# Patient Record
Sex: FEMALE | Race: WHITE | NOT HISPANIC OR LATINO | Employment: FULL TIME | URBAN - METROPOLITAN AREA
[De-identification: names, ages, dates, MRNs, and addresses within clinical notes are randomized per-mention and may not be internally consistent; named-entity substitution may affect disease eponyms.]

---

## 2017-01-01 ENCOUNTER — OUTPATIENT INFUSION SERVICES (OUTPATIENT)
Dept: ONCOLOGY | Facility: MEDICAL CENTER | Age: 22
End: 2017-01-01
Attending: OBSTETRICS & GYNECOLOGY
Payer: COMMERCIAL

## 2017-01-01 VITALS
HEIGHT: 65 IN | WEIGHT: 150.13 LBS | TEMPERATURE: 97 F | SYSTOLIC BLOOD PRESSURE: 121 MMHG | RESPIRATION RATE: 18 BRPM | OXYGEN SATURATION: 96 % | DIASTOLIC BLOOD PRESSURE: 69 MMHG | BODY MASS INDEX: 25.01 KG/M2 | HEART RATE: 92 BPM

## 2017-01-01 DIAGNOSIS — O21.0 HYPEREMESIS GRAVIDARUM: ICD-10-CM

## 2017-01-01 PROCEDURE — 96361 HYDRATE IV INFUSION ADD-ON: CPT

## 2017-01-01 PROCEDURE — 96374 THER/PROPH/DIAG INJ IV PUSH: CPT

## 2017-01-01 PROCEDURE — 700111 HCHG RX REV CODE 636 W/ 250 OVERRIDE (IP): Performed by: OBSTETRICS & GYNECOLOGY

## 2017-01-01 RX ORDER — DEXTROSE, SODIUM CHLORIDE, SODIUM LACTATE, POTASSIUM CHLORIDE, AND CALCIUM CHLORIDE 5; .6; .31; .03; .02 G/100ML; G/100ML; G/100ML; G/100ML; G/100ML
2000 INJECTION, SOLUTION INTRAVENOUS ONCE
Status: COMPLETED | OUTPATIENT
Start: 2017-01-01 | End: 2017-01-01

## 2017-01-01 RX ORDER — ONDANSETRON 2 MG/ML
4 INJECTION INTRAMUSCULAR; INTRAVENOUS ONCE
Status: COMPLETED | OUTPATIENT
Start: 2017-01-01 | End: 2017-01-01

## 2017-01-01 RX ADMIN — ONDANSETRON 4 MG: 2 INJECTION, SOLUTION INTRAMUSCULAR; INTRAVENOUS at 10:49

## 2017-01-01 RX ADMIN — SODIUM CHLORIDE, SODIUM LACTATE, POTASSIUM CHLORIDE, CALCIUM CHLORIDE AND DEXTROSE MONOHYDRATE 2000 ML: 5; 600; 310; 30; 20 INJECTION, SOLUTION INTRAVENOUS at 10:49

## 2017-01-01 NOTE — PROGRESS NOTES
Pt is here for her scheduled hydration. States still having nausea at times - feeling ok this morning. No nausea; nor emesis yet today. She is on her last week of third trimester and hopes her nausea will improve soon. Heat packs used at the IV site for comfort during the hydration. Pt rests comfortably through the treatment. Infusion completed without an incident. Discharged home to self care. Next appointment verified.

## 2017-01-03 ENCOUNTER — APPOINTMENT (OUTPATIENT)
Dept: ONCOLOGY | Facility: MEDICAL CENTER | Age: 22
End: 2017-01-03
Attending: OBSTETRICS & GYNECOLOGY
Payer: COMMERCIAL

## 2017-01-05 ENCOUNTER — APPOINTMENT (OUTPATIENT)
Dept: RADIOLOGY | Facility: MEDICAL CENTER | Age: 22
End: 2017-01-05
Attending: EMERGENCY MEDICINE
Payer: COMMERCIAL

## 2017-01-05 PROCEDURE — 76801 OB US < 14 WKS SINGLE FETUS: CPT

## 2017-01-05 PROCEDURE — 72040 X-RAY EXAM NECK SPINE 2-3 VW: CPT

## 2017-01-10 ENCOUNTER — APPOINTMENT (OUTPATIENT)
Dept: ONCOLOGY | Facility: MEDICAL CENTER | Age: 22
End: 2017-01-10
Attending: OBSTETRICS & GYNECOLOGY
Payer: COMMERCIAL

## 2017-01-15 ENCOUNTER — OUTPATIENT INFUSION SERVICES (OUTPATIENT)
Dept: ONCOLOGY | Facility: MEDICAL CENTER | Age: 22
End: 2017-01-15
Attending: OBSTETRICS & GYNECOLOGY
Payer: COMMERCIAL

## 2017-01-15 ENCOUNTER — HOSPITAL ENCOUNTER (EMERGENCY)
Facility: MEDICAL CENTER | Age: 22
End: 2017-01-15
Attending: EMERGENCY MEDICINE
Payer: COMMERCIAL

## 2017-01-15 VITALS
TEMPERATURE: 98.7 F | SYSTOLIC BLOOD PRESSURE: 114 MMHG | HEART RATE: 79 BPM | BODY MASS INDEX: 24.65 KG/M2 | OXYGEN SATURATION: 97 % | RESPIRATION RATE: 16 BRPM | HEIGHT: 65 IN | DIASTOLIC BLOOD PRESSURE: 80 MMHG | WEIGHT: 147.93 LBS

## 2017-01-15 DIAGNOSIS — O26.891 VAGINAL DISCHARGE DURING PREGNANCY, FIRST TRIMESTER: ICD-10-CM

## 2017-01-15 DIAGNOSIS — N89.8 VAGINAL DISCHARGE DURING PREGNANCY, FIRST TRIMESTER: ICD-10-CM

## 2017-01-15 LAB
BACTERIA GENITAL QL WET PREP: NORMAL
SIGNIFICANT IND 70042: NORMAL
SITE SITE: NORMAL
SOURCE SOURCE: NORMAL

## 2017-01-15 PROCEDURE — 87491 CHLMYD TRACH DNA AMP PROBE: CPT

## 2017-01-15 PROCEDURE — 99284 EMERGENCY DEPT VISIT MOD MDM: CPT

## 2017-01-15 PROCEDURE — 87591 N.GONORRHOEAE DNA AMP PROB: CPT

## 2017-01-15 RX ORDER — METRONIDAZOLE 500 MG/1
500 TABLET ORAL 2 TIMES DAILY
Status: ON HOLD | COMMUNITY
End: 2017-07-21

## 2017-01-15 ASSESSMENT — PAIN SCALES - GENERAL
PAINLEVEL_OUTOF10: 0
PAINLEVEL_OUTOF10: 0

## 2017-01-15 NOTE — ED NOTES
Pelvic exam performed on pt by ERP with myself at the bedside. Privacy maintained. Pt tolerated procedure well. Pt assisted from lithotomy to supine position after procedure. Warm blanket provided. Call light within reach.  Specimens to lab.

## 2017-01-15 NOTE — ED AVS SNAPSHOT
After Visit Summary                                                                                                                Sandy Yang   MRN: [id number]    Department:  [facility], Emergency Dept   Date of Visit:  1/15/2017            [facility], Emergency Dept    [address]    [place] [address]    Phone:  [phone]      You were seen by     Ted Edgar M.D.      Your Diagnosis Was     Vaginal discharge during pregnancy, first trimester     O26.891, N89.8       Follow-up Information     1. Follow up with Olive Valle M.D. In 2 days.    Specialty:  OB/Gyn    Why:  If symptoms worsen, for recheck    Contact information    [address]  [address]  [place] [id number] [phone]        Medication Information     Review all of your home medications and newly ordered medications with your primary doctor and/or pharmacist as soon as possible. Follow medication instructions as directed by your doctor and/or pharmacist.     Please keep your complete medication list with you and share with your physician. Update the information when medications are discontinued, doses are changed, or new medications (including over-the-counter products) are added; and carry medication information at all times in the event of emergency situations.               Medication List      ASK your doctor about these medications        Instructions    metronidazole 500 MG Tabs   Commonly known as:  FLAGYL    Take 500 mg by mouth 2 Times a Day. For 7 days Start date 1/14/2017   Dose:  500 mg       PRENATA PO    Take 1 Tab by mouth every day.   Dose:  1 Tab               Procedures and tests performed during your visit     CHLAMYDIA/GC PCR URINE OR SWAB    PELVIC EXAM    WET PREP        Discharge Instructions       Pregnancy  If you are planning on getting pregnant, it is a good idea to make a preconception appointment with your caregiver to discuss having a  healthy lifestyle before getting pregnant. This includes diet, weight, exercise, taking prenatal vitamins (especially folic acid, which helps prevent brain and spinal cord defects), avoiding alcohol, smoking and illegal drugs, medical problems (diabetes, convulsions), family history of genetic problems, working conditions, and immunizations. It is better to have knowledge of these things and do something about them before getting pregnant.  During your pregnancy, it is important to follow certain guidelines in order to have a healthy baby. It is very important to get good prenatal care and follow your caregiver's instructions. Prenatal care includes all the medical care you receive before your baby's birth. This helps to prevent problems during the pregnancy and childbirth.  HOME CARE INSTRUCTIONS   · Start your prenatal visits by the 12th week of pregnancy or earlier, if possible. At first, appointments are usually scheduled monthly. They become more frequent in the last 2 months before delivery. It is important that you keep your caregiver's appointments and follow your caregiver's instructions regarding medication use, exercise, and diet.  · During pregnancy, you are providing food for you and your baby. Eat a regular, well-balanced diet. Choose foods such as meat, fish, milk and other dairy products, vegetables, fruits, whole-grain breads and cereals. Your caregiver will inform you of the ideal weight gain depending on your current height and weight. Drink lots of liquids. Try to drink 8 glasses of water a day.  · Alcohol is associated with a number of birth defects including fetal alcohol syndrome. It is best to avoid alcohol completely. Smoking will cause low birth rate and prematurity. Use of alcohol and nicotine during your pregnancy also increases the chances that your child will be chemically dependent later in their life and may contribute to SIDS (Sudden Infant Death Syndrome).  · Do not use illegal  drugs.  · Only take prescription or over-the-counter medications that are recommended by your caregiver. Other medications can cause genetic and physical problems in the baby.  · Morning sickness can often be helped by keeping soda crackers at the bedside. Eat a few before getting up in the morning.  · A sexual relationship may be continued until near the end of pregnancy if there are no other problems such as early (premature) leaking of amniotic fluid from the membranes, vaginal bleeding, painful intercourse or belly (abdominal) pain.  · Exercise regularly. Check with your caregiver if you are unsure of the safety of some of your exercises.  · Do not use hot tubs, steam rooms or saunas. These increase the risk of fainting and hurting yourself and the baby. Swimming is OK for exercise. Get plenty of rest, including afternoon naps when possible, especially in the third trimester.  · Avoid toxic odors and chemicals.  · Do not wear high heels. They may cause you to lose your balance and fall.  · Do not lift over 5 pounds. If you do lift anything, lift with your legs and thighs, not your back.  · Avoid long trips, especially in the third trimester.  · If you have to travel out of the city or state, take a copy of your medical records with you.  SEEK IMMEDIATE MEDICAL CARE IF:   · You develop an unexplained oral temperature above 102° F (38.9° C), or as your caregiver suggests.  · You have leaking of fluid from the vagina. If leaking membranes are suspected, take your temperature and inform your caregiver of this when you call.  · There is vaginal spotting or bleeding. Notify your caregiver of the amount and how many pads are used.  · You continue to feel sick to your stomach (nauseous) and have no relief from remedies suggested, or you throw up (vomit) blood or coffee ground like materials.  · You develop upper abdominal pain.  · You have round ligament discomfort in the lower abdominal area. This still must be  evaluated by your caregiver.  · You feel contractions of the uterus.  · You do not feel the baby move, or there is less movement than before.  · You have painful urination.  · You have abnormal vaginal discharge.  · You have persistent diarrhea.  · You get a severe headache.  · You have problems with your vision.  · You develop muscle weakness.  · You feel dizzy and faint.  · You develop shortness of breath.  · You develop chest pain.  · You have back pain that travels down to your leg and feet.  · You feel irregular or a very fast heartbeat.  · You develop excessive weight gain in a short period of time (5 pounds in 3 to 5 days).  · You are involved in a domestic violence situation.  Document Released: 2006 Document Revised: 2013 Document Reviewed: 2010  ExitCare® Patient Information © [employer].  Pelvic Rest  Pelvic rest is sometimes recommended for women when:   · The placenta is partially or completely covering the opening of the cervix (placenta previa).  · There is bleeding between the uterine wall and the amniotic sac in the first trimester (subchorionic hemorrhage).  · The cervix begins to open without labor starting (incompetent cervix, cervical insufficiency).  · The labor is too early ( labor).  HOME CARE INSTRUCTIONS  · Do not have sexual intercourse, stimulation, or an orgasm.  · Do not use tampons, douche, or put anything in the vagina.  · Do not lift anything over 10 pounds (4.5 kg).  · Avoid strenuous activity or straining your pelvic muscles.  SEEK MEDICAL CARE IF:   · You have any vaginal bleeding during pregnancy. Treat this as a potential emergency.  · You have cramping pain felt low in the stomach (stronger than menstrual cramps).  · You notice vaginal discharge (watery, mucus, or bloody).  · You have a low, dull backache.  · There are regular contractions or uterine tightening.  SEEK IMMEDIATE MEDICAL CARE IF:  You have vaginal bleeding and have placenta previa.       This information is not intended to replace advice given to you by your health care provider. Make sure you discuss any questions you have with your health care provider.     Document Released: 04/13/2012 Document Revised: 03/11/2013 Document Reviewed: 04/13/2012  Elsevier Interactive Patient Education ©2016 [employer]            Patient Information     Patient Information    Following emergency treatment: all patient requiring follow-up care must return either to a private physician or a clinic if your condition worsens before you are able to obtain further medical attention, please return to the emergency room.     Billing Information    At [facility], we work to make the billing process streamlined for our patients.  Our Representatives are here to answer any questions you may have regarding your hospital bill.  If you have insurance coverage and have supplied your insurance information to us, we will submit a claim to your insurer on your behalf.  Should you have any questions regarding your bill, we can be reached online or by phone as follows:  Online: You are able pay your bills online or live chat with our representatives about any billing questions you may have. We are here to help Monday - Friday from 8:00am to 7:30pm and 9:00am - 12:00pm on Saturdays.  Please visit [email or web address]  for more information.   Phone:  [phone] or [phone]    Please note that your emergency physician, surgeon, pathologist, radiologist, anesthesiologist, and other specialists are not employed by [facility] and will therefore bill separately for their services.  Please contact them directly for any questions concerning their bills at the numbers below:     Emergency Physician Services:  [phone]  [facility]:  [phone]  [facility]:  [phone]  [facility]:  [phone]    1. Your final bill may vary from the amount  quoted upon discharge if all procedures are not complete at that time, or if your doctor has additional procedures of which we are not aware. You will receive an additional bill if you return to the Emergency Department at [facility] for suture removal regardless of the facility of which the sutures were placed.     2. Please arrange for settlement of this account at the emergency registration.    3. All self-pay accounts are due in full at the time of treatment.  If you are unable to meet this obligation then payment is expected within 4-5 days.     4. If you have had radiology studies (CT, X-ray, Ultrasound, MRI), you have received a preliminary result during your emergency department visit. Please contact the radiology department [phone] to receive a copy of your final result. Please discuss the Final result with your primary physician or with the follow up physician provided.     Crisis Hotline:  [place] Crisis Hotline:  9-251-SWXXBYT or 1-680.743.2511  [place] Crisis Hotline:    1-585.208.4976 or 959-970-0957         ED Discharge Follow Up Questions    1. In order to provide you with very good care, we would like to follow up with a phone call in the next few days.  May we have your permission to contact you?     YES /  NO    2. What is the best phone number to call you? (       )_____-__________    3. What is the best time to call you?      Morning  /  Afternoon  /  Evening                   Patient Signature:  ____________________________________________________________    Date:  ____________________________________________________________      Your appointments     Jan 22, 2017 11:00 AM   Est Hydration with RN 1   In[facility] ([facility])    [address]  [place] [address]   [phone]            Jan 29, 2017 11:00 AM   Est Hydration with RN 2   In[facility] ([facility])    [address]  [place] [address]   [phone]            Feb 05, 2017 11:00 AM   Est  Hydration with RN 2   Infusion [facility] ([facility])    [address]  [place] [address]   [phone]            Feb 12, 2017 11:00 AM   Est Hydration with RN 2   In[facility] ([facility])    [address]  [place] [address]   [phone]            Feb 19, 2017 11:00 AM   Est Hydration with RN 2   Infusion [facility] ([facility])    [address]  [place] [address]   [phone]            Feb 26, 2017 11:00 AM   Est Hydration with RN 2   In[facility] ([facility])    [address]  [place] [address]   [phone]            Mar 05, 2017 11:00 AM   Est Hydration with RN 2   In[facility] ([facility])    [address]  [place] [address]   [phone]            Mar 12, 2017 11:00 AM   Est Hydration with RN 2   Infusion [facility] ([facility])    [address]  [place] [address]   [phone]            Mar 19, 2017 11:00 AM   Est Hydration with RN 2   Infusion [facility] ([facility])    [address]  [place] [address]   [phone]            Mar 26, 2017 11:00 AM   Est Hydration with RN 2   Infusion [facility] ([facility])    [address]  [place] [address]   [phone]            Apr 02, 2017 11:00 AM   Est Hydration with RN 2   Infusion [facility] ([facility])    [address]  [place] [address]   [phone]

## 2017-01-15 NOTE — ED AVS SNAPSHOT
InTouch Technology Access Code: Activation code not generated  Current InTouch Technology Status: Active    [employer]  A secure, online tool to manage your health information     [employer]’s InTouch Technology® is a secure, online tool that connects you to your personalized health information from the privacy of your home -- day or night - making it very easy for you to manage your healthcare. Once the activation process is completed, you can even access your medical information using the InTouch Technology ivory, which is available for free in the Apple Ivory store or Google Play store.     InTouch Technology provides the following levels of access (as shown below):   My Chart Features   [facility] Primary Care Doctor [facility]  Specialists [facility]  Urgent  Care Non-[facility]  Primary Care  Doctor   Email your healthcare team securely and privately 24/7 X X X X   Manage appointments: schedule your next appointment; view details of past/upcoming appointments X      Request prescription refills. X      View recent personal medical records, including lab and immunizations X X X X   View health record, including health history, allergies, medications X X X X   Read reports about your outpatient visits, procedures, consult and ER notes X X X X   See your discharge summary, which is a recap of your hospital and/or ER visit that includes your diagnosis, lab results, and care plan. X X       How to register for InTouch Technology:  1. Go to  [email or web address].  2. Click on the Sign Up Now box, which takes you to the New Member Sign Up page. You will need to provide the following information:  a. Enter your InTouch Technology Access Code exactly as it appears at the top of this page. (You will not need to use this code after you’ve completed the sign-up process. If you do not sign up before the expiration date, you must request a new code.)   b. Enter your date of birth.   c. Enter your home email address.   d. Click Submit, and follow the next screen’s instructions.  3. Create a InTouch Technology ID. This will  be your [employer] login ID and cannot be changed, so think of one that is secure and easy to remember.  4. Create a [employer] password. You can change your password at any time.  5. Enter your Password Reset Question and Answer. This can be used at a later time if you forget your password.   6. Enter your e-mail address. This allows you to receive e-mail notifications when new information is available in [employer].  7. Click Sign Up. You can now view your health information.    For assistance activating your [employer] account, call [phone]

## 2017-01-15 NOTE — DISCHARGE INSTRUCTIONS
Pregnancy  If you are planning on getting pregnant, it is a good idea to make a preconception appointment with your caregiver to discuss having a healthy lifestyle before getting pregnant. This includes diet, weight, exercise, taking prenatal vitamins (especially folic acid, which helps prevent brain and spinal cord defects), avoiding alcohol, smoking and illegal drugs, medical problems (diabetes, convulsions), family history of genetic problems, working conditions, and immunizations. It is better to have knowledge of these things and do something about them before getting pregnant.  During your pregnancy, it is important to follow certain guidelines in order to have a healthy baby. It is very important to get good prenatal care and follow your caregiver's instructions. Prenatal care includes all the medical care you receive before your baby's birth. This helps to prevent problems during the pregnancy and childbirth.  HOME CARE INSTRUCTIONS   · Start your prenatal visits by the 12th week of pregnancy or earlier, if possible. At first, appointments are usually scheduled monthly. They become more frequent in the last 2 months before delivery. It is important that you keep your caregiver's appointments and follow your caregiver's instructions regarding medication use, exercise, and diet.  · During pregnancy, you are providing food for you and your baby. Eat a regular, well-balanced diet. Choose foods such as meat, fish, milk and other dairy products, vegetables, fruits, whole-grain breads and cereals. Your caregiver will inform you of the ideal weight gain depending on your current height and weight. Drink lots of liquids. Try to drink 8 glasses of water a day.  · Alcohol is associated with a number of birth defects including fetal alcohol syndrome. It is best to avoid alcohol completely. Smoking will cause low birth rate and prematurity. Use of alcohol and nicotine during your pregnancy also increases the chances that  your child will be chemically dependent later in their life and may contribute to SIDS (Sudden Infant Death Syndrome).  · Do not use illegal drugs.  · Only take prescription or over-the-counter medications that are recommended by your caregiver. Other medications can cause genetic and physical problems in the baby.  · Morning sickness can often be helped by keeping soda crackers at the bedside. Eat a few before getting up in the morning.  · A sexual relationship may be continued until near the end of pregnancy if there are no other problems such as early (premature) leaking of amniotic fluid from the membranes, vaginal bleeding, painful intercourse or belly (abdominal) pain.  · Exercise regularly. Check with your caregiver if you are unsure of the safety of some of your exercises.  · Do not use hot tubs, steam rooms or saunas. These increase the risk of fainting and hurting yourself and the baby. Swimming is OK for exercise. Get plenty of rest, including afternoon naps when possible, especially in the third trimester.  · Avoid toxic odors and chemicals.  · Do not wear high heels. They may cause you to lose your balance and fall.  · Do not lift over 5 pounds. If you do lift anything, lift with your legs and thighs, not your back.  · Avoid long trips, especially in the third trimester.  · If you have to travel out of the city or state, take a copy of your medical records with you.  SEEK IMMEDIATE MEDICAL CARE IF:   · You develop an unexplained oral temperature above 102° F (38.9° C), or as your caregiver suggests.  · You have leaking of fluid from the vagina. If leaking membranes are suspected, take your temperature and inform your caregiver of this when you call.  · There is vaginal spotting or bleeding. Notify your caregiver of the amount and how many pads are used.  · You continue to feel sick to your stomach (nauseous) and have no relief from remedies suggested, or you throw up (vomit) blood or coffee ground like  materials.  · You develop upper abdominal pain.  · You have round ligament discomfort in the lower abdominal area. This still must be evaluated by your caregiver.  · You feel contractions of the uterus.  · You do not feel the baby move, or there is less movement than before.  · You have painful urination.  · You have abnormal vaginal discharge.  · You have persistent diarrhea.  · You get a severe headache.  · You have problems with your vision.  · You develop muscle weakness.  · You feel dizzy and faint.  · You develop shortness of breath.  · You develop chest pain.  · You have back pain that travels down to your leg and feet.  · You feel irregular or a very fast heartbeat.  · You develop excessive weight gain in a short period of time (5 pounds in 3 to 5 days).  · You are involved in a domestic violence situation.  Document Released: 2006 Document Revised: 2013 Document Reviewed: 2010  ExitCare® Patient Information © [employer].  Pelvic Rest  Pelvic rest is sometimes recommended for women when:   · The placenta is partially or completely covering the opening of the cervix (placenta previa).  · There is bleeding between the uterine wall and the amniotic sac in the first trimester (subchorionic hemorrhage).  · The cervix begins to open without labor starting (incompetent cervix, cervical insufficiency).  · The labor is too early ( labor).  HOME CARE INSTRUCTIONS  · Do not have sexual intercourse, stimulation, or an orgasm.  · Do not use tampons, douche, or put anything in the vagina.  · Do not lift anything over 10 pounds (4.5 kg).  · Avoid strenuous activity or straining your pelvic muscles.  SEEK MEDICAL CARE IF:   · You have any vaginal bleeding during pregnancy. Treat this as a potential emergency.  · You have cramping pain felt low in the stomach (stronger than menstrual cramps).  · You notice vaginal discharge (watery, mucus, or bloody).  · You have a low, dull backache.  · There  are regular contractions or uterine tightening.  SEEK IMMEDIATE MEDICAL CARE IF:  You have vaginal bleeding and have placenta previa.      This information is not intended to replace advice given to you by your health care provider. Make sure you discuss any questions you have with your health care provider.     Document Released: 04/13/2012 Document Revised: 03/11/2013 Document Reviewed: 04/13/2012  [employer] Interactive Patient Education ©2016 [employer]
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
## 2017-01-15 NOTE — ED AVS SNAPSHOT
1/15/2017          Sandy Yang  [address] [address] [facility] [place] [address]    Dear Sandy:    [facility] wants to ensure your discharge home is safe and you or your loved ones have had all your questions answered regarding your care after you leave the hospital.    You may receive a telephone call within two days of your discharge.  This call is to make certain you understand your discharge instructions as well as ensure we provided you with the best care possible during your stay with us.     The call will only last approximately 3-5 minutes and will be done by a nurse.    Once again, we want to ensure your discharge home is safe and that you have a clear understanding of any next steps in your care.  If you have any questions or concerns, please do not hesitate to contact us, we are here for you.  Thank you for choosing [facility] for your healthcare needs.    Sincerely,    Jass Reaves    [facility]

## 2017-01-16 LAB
C TRACH DNA SPEC QL NAA+PROBE: NEGATIVE
N GONORRHOEA DNA SPEC QL NAA+PROBE: NEGATIVE
SPECIMEN SOURCE: NORMAL

## 2017-02-05 ENCOUNTER — APPOINTMENT (OUTPATIENT)
Dept: ONCOLOGY | Facility: MEDICAL CENTER | Age: 22
End: 2017-02-05
Attending: OBSTETRICS & GYNECOLOGY
Payer: COMMERCIAL

## 2017-02-12 ENCOUNTER — APPOINTMENT (OUTPATIENT)
Dept: ONCOLOGY | Facility: MEDICAL CENTER | Age: 22
End: 2017-02-12
Attending: OBSTETRICS & GYNECOLOGY
Payer: COMMERCIAL

## 2017-02-19 ENCOUNTER — APPOINTMENT (OUTPATIENT)
Dept: ONCOLOGY | Facility: MEDICAL CENTER | Age: 22
End: 2017-02-19
Attending: OBSTETRICS & GYNECOLOGY
Payer: COMMERCIAL

## 2017-02-26 ENCOUNTER — APPOINTMENT (OUTPATIENT)
Dept: ONCOLOGY | Facility: MEDICAL CENTER | Age: 22
End: 2017-02-26
Attending: OBSTETRICS & GYNECOLOGY
Payer: COMMERCIAL

## 2017-03-05 ENCOUNTER — APPOINTMENT (OUTPATIENT)
Dept: ONCOLOGY | Facility: MEDICAL CENTER | Age: 22
End: 2017-03-05
Attending: OBSTETRICS & GYNECOLOGY
Payer: COMMERCIAL

## 2017-03-12 ENCOUNTER — APPOINTMENT (OUTPATIENT)
Dept: ONCOLOGY | Facility: MEDICAL CENTER | Age: 22
End: 2017-03-12
Attending: OBSTETRICS & GYNECOLOGY
Payer: COMMERCIAL

## 2017-03-19 ENCOUNTER — APPOINTMENT (OUTPATIENT)
Dept: ONCOLOGY | Facility: MEDICAL CENTER | Age: 22
End: 2017-03-19
Attending: OBSTETRICS & GYNECOLOGY
Payer: COMMERCIAL

## 2017-03-26 ENCOUNTER — APPOINTMENT (OUTPATIENT)
Dept: ONCOLOGY | Facility: MEDICAL CENTER | Age: 22
End: 2017-03-26
Attending: OBSTETRICS & GYNECOLOGY
Payer: COMMERCIAL

## 2017-03-31 ENCOUNTER — PATIENT OUTREACH (OUTPATIENT)
Dept: HEALTH INFORMATION MANAGEMENT | Facility: OTHER | Age: 22
End: 2017-03-31

## 2017-03-31 NOTE — Clinical Note
3/31/2017  Sandy Yang  [address] [address] [facility] [place] [address]     Dear Sandy,    Based on your medical history, our records indicate that you are eligible for [facility] Care Management, a free program that focuses on coordinating the care of individuals with ongoing or complex medical needs. Coordinated care can decrease the total cost of care and, most importantly, improve your overall health.    As a Care Management participant, you’ll be assigned a personal care manager - a medical provider that specializes in carefully monitoring your health and providing care in between visits with your primary care provider and specialists. This program is of no cost to you as it’s a part of [facility]’s ongoing commitment to serving the people of our community.    Benefits of our free Care Management program include:  • Priority appointment scheduling with your [facility] healthcare team  • A personal care manager to coordinate your healthcare   • A comprehensive needs assessment  • Development of an individualized care plan   • Chronic disease education  • A free in-home monitoring device (for eligible patients)    Please contact us at [phone] as soon as possible to confirm your enrollment. Once you have confirmed your participation in this program, we will schedule an introduction with your personal care manager.     For your convenience, we are available 7 days a week from 8:30 a.m. - 7 p.m.    We look forward to speaking with you soon.    Sincerely,   Miguelina ABDULLAHI

## 2017-04-02 ENCOUNTER — APPOINTMENT (OUTPATIENT)
Dept: ONCOLOGY | Facility: MEDICAL CENTER | Age: 22
End: 2017-04-02
Attending: OBSTETRICS & GYNECOLOGY
Payer: COMMERCIAL

## 2017-04-04 ENCOUNTER — OFFICE VISIT (OUTPATIENT)
Dept: MEDICAL GROUP | Age: 22
End: 2017-04-04
Payer: COMMERCIAL

## 2017-04-04 ENCOUNTER — HOSPITAL ENCOUNTER (OUTPATIENT)
Dept: LAB | Facility: MEDICAL CENTER | Age: 22
End: 2017-04-04
Attending: OBSTETRICS & GYNECOLOGY
Payer: COMMERCIAL

## 2017-04-04 VITALS
TEMPERATURE: 98.4 F | BODY MASS INDEX: 27.89 KG/M2 | DIASTOLIC BLOOD PRESSURE: 64 MMHG | SYSTOLIC BLOOD PRESSURE: 120 MMHG | HEART RATE: 88 BPM | WEIGHT: 167.4 LBS | OXYGEN SATURATION: 97 % | HEIGHT: 65 IN

## 2017-04-04 DIAGNOSIS — D48.9 NEOPLASM OF UNCERTAIN BEHAVIOR: ICD-10-CM

## 2017-04-04 LAB
ABO GROUP BLD: NORMAL
AMORPH CRY #/AREA URNS HPF: PRESENT /HPF
APPEARANCE UR: ABNORMAL
BACTERIA #/AREA URNS HPF: ABNORMAL /HPF
BASOPHILS # BLD AUTO: 0.1 % (ref 0–1.8)
BASOPHILS # BLD: 0.01 K/UL (ref 0–0.12)
BILIRUB UR QL STRIP.AUTO: NEGATIVE
BLD GP AB SCN SERPL QL: NORMAL
CAOX CRY #/AREA URNS HPF: ABNORMAL /HPF
COLOR UR: YELLOW
CULTURE IF INDICATED INDCX: YES UA CULTURE
EOSINOPHIL # BLD AUTO: 0.04 K/UL (ref 0–0.51)
EOSINOPHIL NFR BLD: 0.4 % (ref 0–6.9)
EPI CELLS #/AREA URNS HPF: ABNORMAL /HPF
ERYTHROCYTE [DISTWIDTH] IN BLOOD BY AUTOMATED COUNT: 43.7 FL (ref 35.9–50)
GLUCOSE UR STRIP.AUTO-MCNC: NEGATIVE MG/DL
HBV SURFACE AG SER QL: NEGATIVE
HCT VFR BLD AUTO: 35.3 % (ref 37–47)
HGB BLD-MCNC: 11.5 G/DL (ref 12–16)
HIV 1+2 AB+HIV1 P24 AG SERPL QL IA: NON REACTIVE
IMM GRANULOCYTES # BLD AUTO: 0.06 K/UL (ref 0–0.11)
IMM GRANULOCYTES NFR BLD AUTO: 0.6 % (ref 0–0.9)
KETONES UR STRIP.AUTO-MCNC: NEGATIVE MG/DL
LEUKOCYTE ESTERASE UR QL STRIP.AUTO: ABNORMAL
LYMPHOCYTES # BLD AUTO: 1.99 K/UL (ref 1–4.8)
LYMPHOCYTES NFR BLD: 21.1 % (ref 22–41)
MCH RBC QN AUTO: 32.6 PG (ref 27–33)
MCHC RBC AUTO-ENTMCNC: 32.6 G/DL (ref 33.6–35)
MCV RBC AUTO: 100 FL (ref 81.4–97.8)
MICRO URNS: ABNORMAL
MONOCYTES # BLD AUTO: 0.55 K/UL (ref 0–0.85)
MONOCYTES NFR BLD AUTO: 5.8 % (ref 0–13.4)
NEUTROPHILS # BLD AUTO: 6.79 K/UL (ref 2–7.15)
NEUTROPHILS NFR BLD: 72 % (ref 44–72)
NITRITE UR QL STRIP.AUTO: NEGATIVE
NRBC # BLD AUTO: 0 K/UL
NRBC BLD AUTO-RTO: 0 /100 WBC
PH UR STRIP.AUTO: 7 [PH]
PLATELET # BLD AUTO: 222 K/UL (ref 164–446)
PMV BLD AUTO: 11.3 FL (ref 9–12.9)
PROT UR QL STRIP: NEGATIVE MG/DL
RBC # BLD AUTO: 3.53 M/UL (ref 4.2–5.4)
RBC # URNS HPF: ABNORMAL /HPF
RBC UR QL AUTO: NEGATIVE
RH BLD: NORMAL
RUBV AB SER QL: 67.1 IU/ML
SP GR UR STRIP.AUTO: 1.02
TREPONEMA PALLIDUM IGG+IGM AB [PRESENCE] IN SERUM OR PLASMA BY IMMUNOASSAY: NON REACTIVE
WBC # BLD AUTO: 9.4 K/UL (ref 4.8–10.8)
WBC #/AREA URNS HPF: ABNORMAL /HPF

## 2017-04-04 PROCEDURE — 87340 HEPATITIS B SURFACE AG IA: CPT

## 2017-04-04 PROCEDURE — 86850 RBC ANTIBODY SCREEN: CPT

## 2017-04-04 PROCEDURE — 87086 URINE CULTURE/COLONY COUNT: CPT

## 2017-04-04 PROCEDURE — 86901 BLOOD TYPING SEROLOGIC RH(D): CPT

## 2017-04-04 PROCEDURE — 81001 URINALYSIS AUTO W/SCOPE: CPT

## 2017-04-04 PROCEDURE — 86900 BLOOD TYPING SEROLOGIC ABO: CPT

## 2017-04-04 PROCEDURE — 99214 OFFICE O/P EST MOD 30 MIN: CPT | Performed by: FAMILY MEDICINE

## 2017-04-04 PROCEDURE — 86780 TREPONEMA PALLIDUM: CPT

## 2017-04-04 PROCEDURE — 87389 HIV-1 AG W/HIV-1&-2 AB AG IA: CPT

## 2017-04-04 PROCEDURE — 86762 RUBELLA ANTIBODY: CPT

## 2017-04-04 PROCEDURE — 85025 COMPLETE CBC W/AUTO DIFF WBC: CPT

## 2017-04-04 ASSESSMENT — PATIENT HEALTH QUESTIONNAIRE - PHQ9: CLINICAL INTERPRETATION OF PHQ2 SCORE: 2

## 2017-04-04 NOTE — ASSESSMENT & PLAN NOTE
New issue    The patient is a 21-year-old female who presents to clinic with chief complaint of growing red area on right upper chest. She states been there for about 2 years however recently has grown, she scratches it often and it becomes inflamed and irritated. Patient does not use sunscreen consistently, nor initiate frequent sunbather. There is no family history of skin cancer.

## 2017-04-04 NOTE — MR AVS SNAPSHOT
"        Sandy Yang   2017 3:20 PM   Office Visit   MRN: [id number]    Department:  [address] [place] [address]   Dept Phone:  [phone]    Description:  Female : 1995   Provider:  Juan Candelario M.D.           Allergies as of 2017     Allergen Noted Reactions    Lidocaine 2017       Morphine 2016   Rash    Generalized itching and redness.     Morphine 2017       Novocain 2014       Used during dental surgery; had no affect toward deadening her pain    Rocephin [Ceftriaxone] 2015   Rash    Itching      Zoloft 2011   Shortness of Breath    Panic     Zoloft 2017         You were diagnosed with     Neoplasm of uncertain behavior   [[id number]]         Vital Signs     Blood Pressure Pulse Temperature Height Weight Body Mass Index    120/64 mmHg 88 36.9 °C (98.4 °F) 1.651 m (5' 5\") 75.932 kg (167 lb 6.4 oz) 27.86 kg/m2    Oxygen Saturation Last Menstrual Period Breastfeeding? Smoking Status          97% 10/17/2016 No Never Smoker         Basic Information     Date Of Birth Sex Race Ethnicity Preferred Language    1995 Female White Non- English      Problem List              ICD-10-CM Priority Class Noted - Resolved    Seasonal allergies J30.2   3/22/2011 - Present    Gastritis K29.70   11/15/2013 - Present    H/O gastric ulcer Z87.19   11/15/2013 - Present    Family planning Z30.09   2013 - Present    Menstrual irregularity N92.6   2013 - Present    S/P excision of fibroadenoma of breast Z98.890, Z86.018   2013 - Present    Atypical chest pain R07.89   2014 - Present    Major depressive disorder, recurrent, mild (CMS-HCC) F33.0   6/15/2015 - Present    Chronic tonsillitis J35.01   2015 - Present    Myalgia M79.1   2015 - Present    Viral syndrome B34.9   2015 - Present    Drug rash L27.0   2015 - Present    Hypesthesia R20.1   2015 - Present    Abnormal urinalysis R82.90   2015 - Present "    Neck pain M54.2   12/2/2015 - Present    Fever R50.9   12/2/2015 - Present    Postcoital bleeding N93.0   4/6/2016 - Present    Screening for STD (sexually transmitted disease) Z11.3   4/6/2016 - Present    Pregnancy Z33.1   5/13/2016 - Present    Fetal demise before 22 weeks with retention of dead fetus O02.1   8/5/2016 - Present    Encounter for initial prescription of contraceptive pills Z30.011   9/2/2016 - Present    Acute vaginitis N76.0   9/2/2016 - Present    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   4/4/2017 - Present      Health Maintenance        Date Due Completion Dates    IMM HEP A VACCINE (1 of 2 - Standard Series) 11/7/1994 ---    IMM HEP B VACCINE (3 of 3 - Primary Series) 8/4/1997 5/14/1997, 4/14/1997    IMM VARICELLA (CHICKENPOX) VACCINE (1 of 2 - 2 Dose Adolescent Series) 11/7/2006 ---    PAP SMEAR 7/6/2019 7/6/2016, 2/17/2015    IMM DTaP/Tdap/Td Vaccine (6 - Td) 9/22/2025 9/22/2015, 5/3/1995, 9/8/1994, 7/1/1994, 3/15/1994            Current Immunizations     Dtap Vaccine 5/3/1995, 9/8/1994, 7/1/1994, 3/15/1994    HIB Vaccine (ACTHIB/HIBERIX) 5/3/1995, 9/8/1994, 7/1/1994, 3/15/1994    HPV Quadrivalent Vaccine (GARDASIL) 5/21/2014, 3/11/2014, 7/10/2008    Hepatitis B Vaccine Non-Recombivax (Ped/Adol) 5/14/1997, 4/14/1997    IPV 5/3/1995, 9/8/1994, 7/1/1994, 3/15/1994    Influenza TIV (IM) 11/13/2014, 1/3/2014    Influenza Vaccine Quad Inj (Pf) 11/12/2014    Influenza Vaccine Quad Inj (Preserved) 10/20/2015  1:30 AM    MMR Vaccine 5/3/1995    Tdap Vaccine 9/22/2015      Below and/or attached are the medications your provider expects you to take. Review all of your home medications and newly ordered medications with your provider and/or pharmacist. Follow medication instructions as directed by your provider and/or pharmacist. Please keep your medication list with you and share with your provider. Update the information when medications are discontinued, doses are changed, or new medications  (including over-the-counter products) are added; and carry medication information at all times in the event of emergency situations     Allergies:  LIDOCAINE - (reactions not documented)     MORPHINE - Rash     MORPHINE - (reactions not documented)     NOVOCAIN - (reactions not documented)     ROCEPHIN - Rash     ZOLOFT - Shortness of Breath     ZOLOFT - (reactions not documented)               Medications  Valid as of: April 04, 2017 -  3:47 PM    Generic Name Brand Name Tablet Size Instructions for use    Famotidine   Take  by mouth.        MetroNIDAZOLE (Tab) FLAGYL 500 MG Take 500 mg by mouth 2 Times a Day. For 7 days  Start date 1/14/2017        Prenatal w/o A Vit-Fe Fum-FA   Take 1 Tab by mouth every day.        .                 Medicines prescribed today were sent to:     [facility] [address] [place] [address] (), [place] [id number] () [place] [address]    Phone: [phone] Fax: [phone]    Open 24 Hours?: No      Medication refill instructions:       If your prescription bottle indicates you have medication refills left, it is not necessary to call your provider’s office. Please contact your pharmacy and they will refill your medication.    If your prescription bottle indicates you do not have any refills left, you may request refills at any time through one of the following ways: The online Ask[employer] system (except Urgent Care), by calling your provider’s office, or by asking your pharmacy to contact your provider’s office with a refill request. Medication refills are processed only during regular business hours and may not be available until the next business day. Your provider may request additional information or to have a follow-up visit with you prior to refilling your medication.   *Please Note: Medication refills are assigned a new Rx number when refilled electronically. Your pharmacy may indicate that no refills were authorized even though a new prescription for the same  medication is available at the pharmacy. Please request the medicine by name with the pharmacy before contacting your provider for a refill.        Referral     A referral request has been sent to our patient care coordination department. Please allow 3-5 business days for us to process this request and contact you either by phone or mail. If you do not hear from us by the 5th business day, please call us at [phone].           [employer] Access Code: Activation code not generated  Current [employer] Status: Active

## 2017-04-04 NOTE — PROGRESS NOTES
This medical record contains text that has been entered with the assistance of computer voice recognition and dictation software.  Therefore, it may contain unintended errors in text, spelling, punctuation, or grammar    No chief complaint on file.      Sandy Yang is a 21 y.o. female here evaluation and management of: Red irritated skin      HPI:     Neoplasm of uncertain behavior  New issue    The patient is a 21-year-old female who presents to clinic with chief complaint of growing red area on right upper chest. She states been there for about 2 years however recently has grown, she scratches it often and it becomes inflamed and irritated. Patient does not use sunscreen consistently, nor is she a frequent sunbather. There is no family history of skin cancer.      Current medicines (including changes today)  Current Outpatient Prescriptions   Medication Sig Dispense Refill   • Famotidine (PEPCID PO) Take  by mouth.     • Prenatal w/o A Vit-Fe Fum-FA (PRENATA PO) Take 1 Tab by mouth every day.     • metronidazole (FLAGYL) 500 MG Tab Take 500 mg by mouth 2 Times a Day. For 7 days  Start date 1/14/2017       No current facility-administered medications for this visit.     She  has a past medical history of PUD (peptic ulcer disease) (12/23/09); Panic anxiety syndrome (7/8/2013); Family planning (11/16/2013); Indigestion; Atypical chest pain (11/21/2014); Stillbirth; and Gastric ulcer.  She  has past surgical history that includes gastroscopy (12/23/2009); primary c section (8/9/12); and breast biopsy (2/19/2014).  Social History   Substance Use Topics   • Smoking status: Never Smoker    • Smokeless tobacco: Never Used   • Alcohol Use: No      Comment: occasional; not while pregnant     Social History     Social History Narrative    ** Merged History Encounter **         mom is Keara Johnston.     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 Has 14 month old. Lives with BF     Family History  "  Problem Relation Age of Onset   • Cancer Paternal Grandmother      lung cancer   • Heart Disease Maternal Uncle      MI age 45   • Cancer Father      HPV     No family status information on file.         ROS  Please see history of present illness    All other systems reviewed and are negative     Objective:     Blood pressure 120/64, pulse 88, temperature 36.9 °C (98.4 °F), height 1.651 m (5' 5\"), weight 75.932 kg (167 lb 6.4 oz), last menstrual period 10/17/2016, SpO2 97 %, not currently breastfeeding. Body mass index is 27.86 kg/(m^2).  Physical Exam:    Constitutional: Alert, no distress.  Skin: Warm, dry, good turgor, right upper chest reveals nickel size round irregular area of redness, asymmetric, not warm, nontender to touch             Eye: Equal, round and reactive, conjunctiva clear, lids normal.  ENMT: Lips without lesions, good dentition, oropharynx clear.  Neck: Trachea midline, no masses, no thyromegaly. No cervical or supraclavicular lymphadenopathy.  Respiratory: Unlabored respiratory effort, lungs clear to auscultation, no wheezes, no ronchi.  Cardiovascular: Normal S1, S2, no murmur, no edema.  Abdomen: Soft, non-tender, no masses, no hepatosplenomegaly.  Psych: Alert and oriented x3, normal affect and mood.          Assessment and Plan:   The following treatment plan was discussed, again this medical record contains text that has been entered with the assistance of computer voice recognition and dictation software.  Therefore, it may contain unintended errors in text, spelling, punctuation, or grammar      1. Neoplasm of uncertain behavior  I offered to remove it   She preferred to be seen in dermatology    - REFERRAL TO DERMATOLOGY        Followup: Return in about 4 weeks (around 5/2/2017) for Reevaluation.           "

## 2017-04-06 LAB
BACTERIA UR CULT: NORMAL
SIGNIFICANT IND 70042: NORMAL
SOURCE SOURCE: NORMAL

## 2017-05-01 ENCOUNTER — HOSPITAL ENCOUNTER (OUTPATIENT)
Facility: MEDICAL CENTER | Age: 22
End: 2017-05-01
Attending: OBSTETRICS & GYNECOLOGY | Admitting: OBSTETRICS & GYNECOLOGY
Payer: COMMERCIAL

## 2017-05-01 VITALS
HEART RATE: 110 BPM | TEMPERATURE: 99 F | RESPIRATION RATE: 18 BRPM | DIASTOLIC BLOOD PRESSURE: 71 MMHG | SYSTOLIC BLOOD PRESSURE: 120 MMHG

## 2017-05-01 LAB
APPEARANCE UR: CLEAR
COLOR UR AUTO: YELLOW
GLUCOSE UR QL STRIP.AUTO: NEGATIVE MG/DL
KETONES UR QL STRIP.AUTO: NEGATIVE MG/DL
LEUKOCYTE ESTERASE UR QL STRIP.AUTO: ABNORMAL
NITRITE UR QL STRIP.AUTO: NEGATIVE
PH UR STRIP.AUTO: 7 [PH]
PROT UR QL STRIP: NEGATIVE MG/DL
RBC UR QL AUTO: NEGATIVE
SP GR UR: 1.01

## 2017-05-01 PROCEDURE — 81002 URINALYSIS NONAUTO W/O SCOPE: CPT

## 2017-05-01 NOTE — PROGRESS NOTES
1325-pt presents from home with c/o decreased fetal movement and suprapubic pain, states that it is becoming bad enough that she can not walk easliy, no c/o leaking, bleeding or uc's, placed on external monitors, vs taken, will watch for uc's, instructed pt to listen to the monitors for movement and see if she can feel what she hears, verbalized understanding  1400-TC Dr Holliday, irritability noted on monitors, no uc's, pt states that baby is moving normally now,  report given, wants SVE, if closed/thick/high pt may go home  1405-SVE closed/thick/high  1410-pt discharged home with understanding PTL, verbalized understanding, left ambulatory with daughter

## 2017-05-03 ENCOUNTER — HOSPITAL ENCOUNTER (OUTPATIENT)
Dept: LAB | Facility: MEDICAL CENTER | Age: 22
End: 2017-05-03
Attending: OBSTETRICS & GYNECOLOGY
Payer: COMMERCIAL

## 2017-05-03 LAB
ERYTHROCYTE [DISTWIDTH] IN BLOOD BY AUTOMATED COUNT: 48.4 FL (ref 35.9–50)
GLUCOSE 1H P 50 G GLC PO SERPL-MCNC: 93 MG/DL (ref 70–139)
HCT VFR BLD AUTO: 37 % (ref 37–47)
HGB BLD-MCNC: 11.9 G/DL (ref 12–16)
MCH RBC QN AUTO: 33.2 PG (ref 27–33)
MCHC RBC AUTO-ENTMCNC: 32.2 G/DL (ref 33.6–35)
MCV RBC AUTO: 103.4 FL (ref 81.4–97.8)
PLATELET # BLD AUTO: 221 K/UL (ref 164–446)
PMV BLD AUTO: 10.8 FL (ref 9–12.9)
RBC # BLD AUTO: 3.58 M/UL (ref 4.2–5.4)
TREPONEMA PALLIDUM IGG+IGM AB [PRESENCE] IN SERUM OR PLASMA BY IMMUNOASSAY: NON REACTIVE
WBC # BLD AUTO: 11.3 K/UL (ref 4.8–10.8)

## 2017-05-03 PROCEDURE — 86780 TREPONEMA PALLIDUM: CPT

## 2017-05-03 PROCEDURE — 85027 COMPLETE CBC AUTOMATED: CPT

## 2017-05-03 PROCEDURE — 82950 GLUCOSE TEST: CPT

## 2017-06-14 ENCOUNTER — HOSPITAL ENCOUNTER (OUTPATIENT)
Facility: MEDICAL CENTER | Age: 22
End: 2017-06-14
Attending: OBSTETRICS & GYNECOLOGY | Admitting: OBSTETRICS & GYNECOLOGY
Payer: COMMERCIAL

## 2017-06-14 VITALS — TEMPERATURE: 98.3 F | HEART RATE: 111 BPM | DIASTOLIC BLOOD PRESSURE: 72 MMHG | SYSTOLIC BLOOD PRESSURE: 118 MMHG

## 2017-06-14 LAB — A1 MICROGLOB PLACENTAL VAG QL: NEGATIVE

## 2017-06-14 PROCEDURE — 84112 EVAL AMNIOTIC FLUID PROTEIN: CPT

## 2017-06-14 PROCEDURE — 59025 FETAL NON-STRESS TEST: CPT | Performed by: OBSTETRICS & GYNECOLOGY

## 2017-06-14 NOTE — PROGRESS NOTES
Patient came in for ob check.EFM applied and POC discussed.she came in from Dr soliz sent this patient over for leaking of fluid.she is due 7-22 which makes her 34.4 weeks.amnisure done.result of amnisure is negative,Dr Thompson called and informed.1630 discharged home in stable condition.

## 2017-07-18 ENCOUNTER — HOSPITAL ENCOUNTER (INPATIENT)
Facility: MEDICAL CENTER | Age: 22
LOS: 3 days | End: 2017-07-21
Attending: OBSTETRICS & GYNECOLOGY | Admitting: OBSTETRICS & GYNECOLOGY
Payer: COMMERCIAL

## 2017-07-18 LAB
APPEARANCE UR: ABNORMAL
BASOPHILS # BLD AUTO: 0.3 % (ref 0–1.8)
BASOPHILS # BLD: 0.04 K/UL (ref 0–0.12)
COLOR UR AUTO: ABNORMAL
EOSINOPHIL # BLD AUTO: 0.01 K/UL (ref 0–0.51)
EOSINOPHIL NFR BLD: 0.1 % (ref 0–6.9)
ERYTHROCYTE [DISTWIDTH] IN BLOOD BY AUTOMATED COUNT: 44.6 FL (ref 35.9–50)
GLUCOSE UR QL STRIP.AUTO: NEGATIVE MG/DL
HCT VFR BLD AUTO: 34.8 % (ref 37–47)
HGB BLD-MCNC: 11.5 G/DL (ref 12–16)
HOLDING TUBE BB 8507: NORMAL
IMM GRANULOCYTES # BLD AUTO: 0.05 K/UL (ref 0–0.11)
IMM GRANULOCYTES NFR BLD AUTO: 0.4 % (ref 0–0.9)
KETONES UR QL STRIP.AUTO: NEGATIVE MG/DL
LEUKOCYTE ESTERASE UR QL STRIP.AUTO: ABNORMAL
LYMPHOCYTES # BLD AUTO: 1.77 K/UL (ref 1–4.8)
LYMPHOCYTES NFR BLD: 14.7 % (ref 22–41)
MCH RBC QN AUTO: 32.6 PG (ref 27–33)
MCHC RBC AUTO-ENTMCNC: 33 G/DL (ref 33.6–35)
MCV RBC AUTO: 98.6 FL (ref 81.4–97.8)
MONOCYTES # BLD AUTO: 0.62 K/UL (ref 0–0.85)
MONOCYTES NFR BLD AUTO: 5.2 % (ref 0–13.4)
NEUTROPHILS # BLD AUTO: 9.52 K/UL (ref 2–7.15)
NEUTROPHILS NFR BLD: 79.3 % (ref 44–72)
NITRITE UR QL STRIP.AUTO: NEGATIVE
NRBC # BLD AUTO: 0 K/UL
NRBC BLD AUTO-RTO: 0 /100 WBC
PH UR STRIP.AUTO: 6.5 [PH]
PLATELET # BLD AUTO: 212 K/UL (ref 164–446)
PMV BLD AUTO: 11 FL (ref 9–12.9)
PROT UR QL STRIP: 30 MG/DL
RBC # BLD AUTO: 3.53 M/UL (ref 4.2–5.4)
RBC UR QL AUTO: NEGATIVE
SP GR UR: 1.02
WBC # BLD AUTO: 12 K/UL (ref 4.8–10.8)

## 2017-07-18 PROCEDURE — 700101 HCHG RX REV CODE 250

## 2017-07-18 PROCEDURE — 85025 COMPLETE CBC W/AUTO DIFF WBC: CPT

## 2017-07-18 PROCEDURE — 306828 HCHG ANES-TIME GENERAL: Performed by: OBSTETRICS & GYNECOLOGY

## 2017-07-18 PROCEDURE — 770002 HCHG ROOM/CARE - OB PRIVATE (112)

## 2017-07-18 PROCEDURE — 700102 HCHG RX REV CODE 250 W/ 637 OVERRIDE(OP): Performed by: OBSTETRICS & GYNECOLOGY

## 2017-07-18 PROCEDURE — 304964 HCHG RECOVERY ROOM TIME 1HR

## 2017-07-18 PROCEDURE — A9270 NON-COVERED ITEM OR SERVICE: HCPCS | Performed by: ANESTHESIOLOGY

## 2017-07-18 PROCEDURE — 700111 HCHG RX REV CODE 636 W/ 250 OVERRIDE (IP)

## 2017-07-18 PROCEDURE — 305385 HCHG SURGICAL SERVICES 1/4 HOUR

## 2017-07-18 PROCEDURE — A9270 NON-COVERED ITEM OR SERVICE: HCPCS | Performed by: OBSTETRICS & GYNECOLOGY

## 2017-07-18 PROCEDURE — 81002 URINALYSIS NONAUTO W/O SCOPE: CPT

## 2017-07-18 PROCEDURE — 700105 HCHG RX REV CODE 258: Performed by: ANESTHESIOLOGY

## 2017-07-18 PROCEDURE — 700111 HCHG RX REV CODE 636 W/ 250 OVERRIDE (IP): Performed by: ANESTHESIOLOGY

## 2017-07-18 PROCEDURE — 306288 HCHG RETRACTOR C SECTION LG

## 2017-07-18 PROCEDURE — 4A1HXCZ MONITORING OF PRODUCTS OF CONCEPTION, CARDIAC RATE, EXTERNAL APPROACH: ICD-10-PCS | Performed by: OBSTETRICS & GYNECOLOGY

## 2017-07-18 PROCEDURE — 36415 COLL VENOUS BLD VENIPUNCTURE: CPT

## 2017-07-18 PROCEDURE — 59514 CESAREAN DELIVERY ONLY: CPT

## 2017-07-18 PROCEDURE — 700102 HCHG RX REV CODE 250 W/ 637 OVERRIDE(OP): Performed by: ANESTHESIOLOGY

## 2017-07-18 RX ORDER — IBUPROFEN 600 MG/1
600 TABLET ORAL EVERY 6 HOURS PRN
Status: DISCONTINUED | OUTPATIENT
Start: 2017-07-19 | End: 2017-07-18

## 2017-07-18 RX ORDER — OXYCODONE AND ACETAMINOPHEN 10; 325 MG/1; MG/1
1 TABLET ORAL EVERY 4 HOURS PRN
Status: DISCONTINUED | OUTPATIENT
Start: 2017-07-19 | End: 2017-07-18

## 2017-07-18 RX ORDER — OXYCODONE HYDROCHLORIDE AND ACETAMINOPHEN 5; 325 MG/1; MG/1
1 TABLET ORAL EVERY 4 HOURS PRN
Status: CANCELLED | OUTPATIENT
Start: 2017-07-18 | End: 2017-07-19

## 2017-07-18 RX ORDER — FLUOXETINE HYDROCHLORIDE 20 MG/1
40 CAPSULE ORAL DAILY
Status: DISCONTINUED | OUTPATIENT
Start: 2017-07-18 | End: 2017-07-21 | Stop reason: HOSPADM

## 2017-07-18 RX ORDER — DOCUSATE SODIUM 100 MG/1
100 CAPSULE, LIQUID FILLED ORAL 2 TIMES DAILY PRN
Status: DISCONTINUED | OUTPATIENT
Start: 2017-07-18 | End: 2017-07-21 | Stop reason: HOSPADM

## 2017-07-18 RX ORDER — METHYLERGONOVINE MALEATE 0.2 MG/ML
0.2 INJECTION INTRAVENOUS
Status: DISCONTINUED | OUTPATIENT
Start: 2017-07-18 | End: 2017-07-21 | Stop reason: HOSPADM

## 2017-07-18 RX ORDER — SODIUM CHLORIDE, SODIUM LACTATE, POTASSIUM CHLORIDE, CALCIUM CHLORIDE 600; 310; 30; 20 MG/100ML; MG/100ML; MG/100ML; MG/100ML
INJECTION, SOLUTION INTRAVENOUS PRN
Status: DISCONTINUED | OUTPATIENT
Start: 2017-07-18 | End: 2017-07-21 | Stop reason: HOSPADM

## 2017-07-18 RX ORDER — IBUPROFEN 600 MG/1
600 TABLET ORAL EVERY 6 HOURS PRN
Status: DISCONTINUED | OUTPATIENT
Start: 2017-07-19 | End: 2017-07-19

## 2017-07-18 RX ORDER — DIPHENHYDRAMINE HYDROCHLORIDE 50 MG/ML
INJECTION INTRAMUSCULAR; INTRAVENOUS
Status: COMPLETED
Start: 2017-07-18 | End: 2017-07-18

## 2017-07-18 RX ORDER — DIPHENHYDRAMINE HCL 25 MG
25 TABLET ORAL EVERY 6 HOURS PRN
Status: CANCELLED | OUTPATIENT
Start: 2017-07-18

## 2017-07-18 RX ORDER — METOCLOPRAMIDE HYDROCHLORIDE 5 MG/ML
10 INJECTION INTRAMUSCULAR; INTRAVENOUS ONCE
Status: COMPLETED | OUTPATIENT
Start: 2017-07-18 | End: 2017-07-18

## 2017-07-18 RX ORDER — DIPHENHYDRAMINE HYDROCHLORIDE 50 MG/ML
12.5 INJECTION INTRAMUSCULAR; INTRAVENOUS EVERY 6 HOURS PRN
Status: DISCONTINUED | OUTPATIENT
Start: 2017-07-18 | End: 2017-07-18

## 2017-07-18 RX ORDER — SODIUM CHLORIDE, SODIUM LACTATE, POTASSIUM CHLORIDE, CALCIUM CHLORIDE 600; 310; 30; 20 MG/100ML; MG/100ML; MG/100ML; MG/100ML
INJECTION, SOLUTION INTRAVENOUS PRN
Status: CANCELLED | OUTPATIENT
Start: 2017-07-18

## 2017-07-18 RX ORDER — METOCLOPRAMIDE HYDROCHLORIDE 5 MG/ML
10 INJECTION INTRAMUSCULAR; INTRAVENOUS EVERY 6 HOURS PRN
Status: DISCONTINUED | OUTPATIENT
Start: 2017-07-18 | End: 2017-07-19 | Stop reason: ALTCHOICE

## 2017-07-18 RX ORDER — METOCLOPRAMIDE HYDROCHLORIDE 5 MG/ML
10 INJECTION INTRAMUSCULAR; INTRAVENOUS EVERY 6 HOURS PRN
Status: CANCELLED | OUTPATIENT
Start: 2017-07-18

## 2017-07-18 RX ORDER — ACETAMINOPHEN 325 MG/1
325 TABLET ORAL EVERY 4 HOURS PRN
Status: DISCONTINUED | OUTPATIENT
Start: 2017-07-18 | End: 2017-07-21 | Stop reason: HOSPADM

## 2017-07-18 RX ORDER — KETOROLAC TROMETHAMINE 30 MG/ML
30 INJECTION, SOLUTION INTRAMUSCULAR; INTRAVENOUS EVERY 6 HOURS
Status: CANCELLED | OUTPATIENT
Start: 2017-07-18 | End: 2017-07-19

## 2017-07-18 RX ORDER — HALOPERIDOL 5 MG/ML
INJECTION INTRAMUSCULAR
Status: COMPLETED
Start: 2017-07-18 | End: 2017-07-18

## 2017-07-18 RX ORDER — MISOPROSTOL 200 UG/1
600 TABLET ORAL
Status: CANCELLED | OUTPATIENT
Start: 2017-07-18

## 2017-07-18 RX ORDER — SIMETHICONE 80 MG
80 TABLET,CHEWABLE ORAL 4 TIMES DAILY PRN
Status: CANCELLED | OUTPATIENT
Start: 2017-07-18

## 2017-07-18 RX ORDER — BUSPIRONE HYDROCHLORIDE 5 MG/1
5 TABLET ORAL 3 TIMES DAILY
Status: DISCONTINUED | OUTPATIENT
Start: 2017-07-18 | End: 2017-07-21 | Stop reason: HOSPADM

## 2017-07-18 RX ORDER — VITAMIN A ACETATE, BETA CAROTENE, ASCORBIC ACID, CHOLECALCIFEROL, .ALPHA.-TOCOPHEROL ACETATE, DL-, THIAMINE MONONITRATE, RIBOFLAVIN, NIACINAMIDE, PYRIDOXINE HYDROCHLORIDE, FOLIC ACID, CYANOCOBALAMIN, CALCIUM CARBONATE, FERROUS FUMARATE, ZINC OXIDE, CUPRIC OXIDE 3080; 12; 120; 400; 1; 1.84; 3; 20; 22; 920; 25; 200; 27; 10; 2 [IU]/1; UG/1; MG/1; [IU]/1; MG/1; MG/1; MG/1; MG/1; MG/1; [IU]/1; MG/1; MG/1; MG/1; MG/1; MG/1
1 TABLET, FILM COATED ORAL EVERY MORNING
Status: DISCONTINUED | OUTPATIENT
Start: 2017-07-19 | End: 2017-07-21 | Stop reason: HOSPADM

## 2017-07-18 RX ORDER — OXYCODONE HYDROCHLORIDE AND ACETAMINOPHEN 5; 325 MG/1; MG/1
1 TABLET ORAL EVERY 4 HOURS PRN
Status: DISCONTINUED | OUTPATIENT
Start: 2017-07-19 | End: 2017-07-18

## 2017-07-18 RX ORDER — DIPHENHYDRAMINE HYDROCHLORIDE 50 MG/ML
25 INJECTION INTRAMUSCULAR; INTRAVENOUS EVERY 6 HOURS PRN
Status: DISCONTINUED | OUTPATIENT
Start: 2017-07-18 | End: 2017-07-19 | Stop reason: ALTCHOICE

## 2017-07-18 RX ORDER — SODIUM CHLORIDE, SODIUM LACTATE, POTASSIUM CHLORIDE, CALCIUM CHLORIDE 600; 310; 30; 20 MG/100ML; MG/100ML; MG/100ML; MG/100ML
1500 INJECTION, SOLUTION INTRAVENOUS ONCE
Status: COMPLETED | OUTPATIENT
Start: 2017-07-18 | End: 2017-07-18

## 2017-07-18 RX ORDER — HALOPERIDOL 5 MG/ML
1 INJECTION INTRAMUSCULAR ONCE
Status: COMPLETED | OUTPATIENT
Start: 2017-07-18 | End: 2017-07-18

## 2017-07-18 RX ORDER — DIPHENHYDRAMINE HYDROCHLORIDE 50 MG/ML
25 INJECTION INTRAMUSCULAR; INTRAVENOUS EVERY 6 HOURS PRN
Status: DISCONTINUED | OUTPATIENT
Start: 2017-07-18 | End: 2017-07-18

## 2017-07-18 RX ORDER — MORPHINE SULFATE 4 MG/ML
4 INJECTION, SOLUTION INTRAMUSCULAR; INTRAVENOUS
Status: CANCELLED | OUTPATIENT
Start: 2017-07-18

## 2017-07-18 RX ORDER — OXYCODONE AND ACETAMINOPHEN 10; 325 MG/1; MG/1
1 TABLET ORAL EVERY 4 HOURS PRN
Status: DISCONTINUED | OUTPATIENT
Start: 2017-07-18 | End: 2017-07-19 | Stop reason: ALTCHOICE

## 2017-07-18 RX ORDER — KETOROLAC TROMETHAMINE 30 MG/ML
30 INJECTION, SOLUTION INTRAMUSCULAR; INTRAVENOUS EVERY 6 HOURS
Status: DISCONTINUED | OUTPATIENT
Start: 2017-07-18 | End: 2017-07-19 | Stop reason: ALTCHOICE

## 2017-07-18 RX ORDER — SIMETHICONE 80 MG
80 TABLET,CHEWABLE ORAL 4 TIMES DAILY PRN
Status: DISCONTINUED | OUTPATIENT
Start: 2017-07-18 | End: 2017-07-21 | Stop reason: HOSPADM

## 2017-07-18 RX ORDER — DIPHENHYDRAMINE HYDROCHLORIDE 50 MG/ML
12.5 INJECTION INTRAMUSCULAR; INTRAVENOUS EVERY 6 HOURS PRN
Status: DISCONTINUED | OUTPATIENT
Start: 2017-07-18 | End: 2017-07-19 | Stop reason: ALTCHOICE

## 2017-07-18 RX ORDER — ACETAMINOPHEN 325 MG/1
325 TABLET ORAL EVERY 4 HOURS PRN
Status: CANCELLED | OUTPATIENT
Start: 2017-07-18

## 2017-07-18 RX ORDER — BISACODYL 10 MG
10 SUPPOSITORY, RECTAL RECTAL PRN
Status: DISCONTINUED | OUTPATIENT
Start: 2017-07-18 | End: 2017-07-21 | Stop reason: HOSPADM

## 2017-07-18 RX ORDER — OXYCODONE HYDROCHLORIDE AND ACETAMINOPHEN 5; 325 MG/1; MG/1
1 TABLET ORAL EVERY 4 HOURS PRN
Status: CANCELLED | OUTPATIENT
Start: 2017-07-18

## 2017-07-18 RX ORDER — DIPHENHYDRAMINE HYDROCHLORIDE 50 MG/ML
25 INJECTION INTRAMUSCULAR; INTRAVENOUS EVERY 6 HOURS PRN
Status: CANCELLED | OUTPATIENT
Start: 2017-07-18

## 2017-07-18 RX ORDER — METHYLERGONOVINE MALEATE 0.2 MG/ML
0.2 INJECTION INTRAVENOUS
Status: CANCELLED | OUTPATIENT
Start: 2017-07-18

## 2017-07-18 RX ORDER — OXYCODONE AND ACETAMINOPHEN 10; 325 MG/1; MG/1
1 TABLET ORAL EVERY 4 HOURS PRN
Status: CANCELLED | OUTPATIENT
Start: 2017-07-18 | End: 2017-07-19

## 2017-07-18 RX ORDER — OXYCODONE AND ACETAMINOPHEN 10; 325 MG/1; MG/1
1 TABLET ORAL EVERY 4 HOURS PRN
Status: CANCELLED | OUTPATIENT
Start: 2017-07-18

## 2017-07-18 RX ORDER — OXYCODONE AND ACETAMINOPHEN 10; 325 MG/1; MG/1
1 TABLET ORAL EVERY 4 HOURS PRN
Status: DISCONTINUED | OUTPATIENT
Start: 2017-07-18 | End: 2017-07-18

## 2017-07-18 RX ORDER — ONDANSETRON 2 MG/ML
4 INJECTION INTRAMUSCULAR; INTRAVENOUS EVERY 6 HOURS PRN
Status: DISCONTINUED | OUTPATIENT
Start: 2017-07-18 | End: 2017-07-19 | Stop reason: ALTCHOICE

## 2017-07-18 RX ORDER — OXYCODONE HYDROCHLORIDE AND ACETAMINOPHEN 5; 325 MG/1; MG/1
1 TABLET ORAL EVERY 4 HOURS PRN
Status: DISCONTINUED | OUTPATIENT
Start: 2017-07-18 | End: 2017-07-19 | Stop reason: ALTCHOICE

## 2017-07-18 RX ORDER — IBUPROFEN 600 MG/1
600 TABLET ORAL EVERY 6 HOURS PRN
Status: CANCELLED | OUTPATIENT
Start: 2017-07-18

## 2017-07-18 RX ORDER — MISOPROSTOL 200 UG/1
600 TABLET ORAL
Status: DISCONTINUED | OUTPATIENT
Start: 2017-07-18 | End: 2017-07-21 | Stop reason: HOSPADM

## 2017-07-18 RX ORDER — DOCUSATE SODIUM 100 MG/1
100 CAPSULE, LIQUID FILLED ORAL 2 TIMES DAILY PRN
Status: CANCELLED | OUTPATIENT
Start: 2017-07-18

## 2017-07-18 RX ADMIN — OXYCODONE HYDROCHLORIDE AND ACETAMINOPHEN 1 TABLET: 10; 325 TABLET ORAL at 19:49

## 2017-07-18 RX ADMIN — OXYCODONE HYDROCHLORIDE AND ACETAMINOPHEN 1 TABLET: 10; 325 TABLET ORAL at 15:37

## 2017-07-18 RX ADMIN — FENTANYL CITRATE 50 MCG: 50 INJECTION, SOLUTION INTRAMUSCULAR; INTRAVENOUS at 13:46

## 2017-07-18 RX ADMIN — DIPHENHYDRAMINE HYDROCHLORIDE 25 MG: 50 INJECTION INTRAMUSCULAR; INTRAVENOUS at 14:08

## 2017-07-18 RX ADMIN — SODIUM CITRATE AND CITRIC ACID MONOHYDRATE 30 ML: 500; 334 SOLUTION ORAL at 12:03

## 2017-07-18 RX ADMIN — DIPHENHYDRAMINE HYDROCHLORIDE 12.5 MG: 50 INJECTION, SOLUTION INTRAMUSCULAR; INTRAVENOUS at 19:57

## 2017-07-18 RX ADMIN — FAMOTIDINE 20 MG: 10 INJECTION INTRAVENOUS at 12:03

## 2017-07-18 RX ADMIN — SODIUM CHLORIDE, POTASSIUM CHLORIDE, SODIUM LACTATE AND CALCIUM CHLORIDE 1500 ML: 600; 310; 30; 20 INJECTION, SOLUTION INTRAVENOUS at 11:15

## 2017-07-18 RX ADMIN — BUSPIRONE HYDROCHLORIDE 5 MG: 5 TABLET ORAL at 22:36

## 2017-07-18 RX ADMIN — OXYCODONE HYDROCHLORIDE AND ACETAMINOPHEN 1 TABLET: 10; 325 TABLET ORAL at 23:50

## 2017-07-18 RX ADMIN — HYDROMORPHONE HYDROCHLORIDE 0.4 MG: 1 INJECTION, SOLUTION INTRAMUSCULAR; INTRAVENOUS; SUBCUTANEOUS at 16:20

## 2017-07-18 RX ADMIN — HYDROMORPHONE HYDROCHLORIDE 0.5 MG: 1 INJECTION, SOLUTION INTRAMUSCULAR; INTRAVENOUS; SUBCUTANEOUS at 14:03

## 2017-07-18 RX ADMIN — KETOROLAC TROMETHAMINE 30 MG: 30 INJECTION, SOLUTION INTRAMUSCULAR at 19:45

## 2017-07-18 RX ADMIN — DIPHENHYDRAMINE HYDROCHLORIDE 25 MG: 50 INJECTION, SOLUTION INTRAMUSCULAR; INTRAVENOUS at 14:08

## 2017-07-18 RX ADMIN — HALOPERIDOL LACTATE 1 MG: 5 INJECTION, SOLUTION INTRAMUSCULAR at 13:30

## 2017-07-18 RX ADMIN — HYDROMORPHONE HYDROCHLORIDE 0.2 MG: 1 INJECTION, SOLUTION INTRAMUSCULAR; INTRAVENOUS; SUBCUTANEOUS at 18:23

## 2017-07-18 RX ADMIN — METOCLOPRAMIDE 10 MG: 5 INJECTION, SOLUTION INTRAMUSCULAR; INTRAVENOUS at 12:03

## 2017-07-18 ASSESSMENT — PATIENT HEALTH QUESTIONNAIRE - PHQ9
SUM OF ALL RESPONSES TO PHQ9 QUESTIONS 1 AND 2: 0
1. LITTLE INTEREST OR PLEASURE IN DOING THINGS: NOT AT ALL
2. FEELING DOWN, DEPRESSED, IRRITABLE, OR HOPELESS: NOT AT ALL
SUM OF ALL RESPONSES TO PHQ QUESTIONS 1-9: 0

## 2017-07-18 ASSESSMENT — PAIN SCALES - GENERAL
PAINLEVEL_OUTOF10: 6
PAINLEVEL_OUTOF10: 0
PAINLEVEL_OUTOF10: 8
PAINLEVEL_OUTOF10: 3
PAINLEVEL_OUTOF10: 4
PAINLEVEL_OUTOF10: 1
PAINLEVEL_OUTOF10: 0
PAINLEVEL_OUTOF10: 4

## 2017-07-18 ASSESSMENT — LIFESTYLE VARIABLES
ALCOHOL_USE: NO
DO YOU DRINK ALCOHOL: NO
EVER_SMOKED: NEVER

## 2017-07-18 ASSESSMENT — COPD QUESTIONNAIRES
DURING THE PAST 4 WEEKS HOW MUCH DID YOU FEEL SHORT OF BREATH: NONE/LITTLE OF THE TIME
DO YOU EVER COUGH UP ANY MUCUS OR PHLEGM?: NO/ONLY WITH OCCASIONAL COLDS OR INFECTIONS
HAVE YOU SMOKED AT LEAST 100 CIGARETTES IN YOUR ENTIRE LIFE: NO/DON'T KNOW
COPD SCREENING SCORE: 0

## 2017-07-18 NOTE — OP REPORT
Section Operative Note    Date of Operation: 2017    Preoperative Diagnosis:  IUP at 39 weeks, previous c/section, desires repeat    Postoperative Diagnosis: same    Principal Procedure: Repeat low transverse  Section    Surgeon: Olive Valle    Assistant: Dr. Mendez Garibay    Anesthesiologist: Anesthesiologist: Paul Chaudhry M.D./Spinal    Anesthesia: regional    Principal Procedure As Follows:  After adequate regional anesthesia was ascertained, the patient was prepped and draped in a normal supine position with a wedge under the right hip.  A pfannensteil incision was made.  The incision was taken down to the fascia, which was incised medial to lateral.  The rectus muscles were dissected off the rectus sheath.  There was separation of the rectus sheath superiorly and the peritoneum was entered atraumatically, extended superiorly and inferiorly.  The lower uterine segment was identified.  A low transverse incision was made in the uterus.  It was then extended digitally and the membranes were intact with clear fluid.  The infant was born in a vertex position.  It was a viable female infant, Apgar 8 and 9, and weight pending.  Placenta was spontaneously delivered.  The uterus was repaired in situ with an joy-o retractor, cleansed of all clots and membranes.  The lower segment was dilated for lochia egress.  Attention was made towards closing the uterus in a 2-layer fashion with 0 Vicryl suture ligature in a running interlocking stitch with hemostatis assured.  The gutters were cleansed of all clots.  Tubal ligation was not performed.  Normal tubes and ovaries were noted.  The peritoneum was closed with running 3-0 Vicryl.  The rectus muscles were inspected, found to be hemostatic, and were closed with 0 Vicryl in a running fashion.  The fascia was closed with 0 Vicryl going right to left, left to right, crossing in the midline with a running interlocking stitch.  The subcutaneous  tissues was copiously irrigated.  Small capillary bleeders individually coagulated.  The subcutaneous tissues were approximated with running 3-0 Vicryl and the skin with monocryl.  Estimated blood loss 600 mL and sponge and needle count were correct x3.    Olive Valle M.D.

## 2017-07-18 NOTE — PROGRESS NOTES
TRANSFERRED FROM L&D TO POSTPARTUM VIA GUERNEY. TRANSFERRED TO BED. ASSESSMENT DONE. SARAHA  Oly rubra. PAIN 7 of 10 medicated with percocet 10mg.iV PATENT right arm without redness or swelling HENSLEY TO DD. SEQUENTIALS ON. ORIENTED TO UNIT PROCEDURES AND INFANT SAFETY. ENC. TO CALL WITH NEEDS.DS.

## 2017-07-18 NOTE — PROGRESS NOTES
Still c/o pain 10 of 10 medicated with 0.4mg of dilaudid iv with good relief. Wasted 0.6 mg. Med select not working properly did not ask for witness for waste. Fe dean supervisor witnessed waste. Pharmacy notified

## 2017-07-18 NOTE — IP AVS SNAPSHOT
MeetingSense Software Access Code: Activation code not generated  Current MeetingSense Software Status: Active    "[employer]hart  A secure, online tool to manage your health information     [employer]’s MeetingSense Software® is a secure, online tool that connects you to your personalized health information from the privacy of your home -- day or night - making it very easy for you to manage your healthcare. Once the activation process is completed, you can even access your medical information using the MeetingSense Software ivory, which is available for free in the Apple Ivory store or Google Play store.     MeetingSense Software provides the following levels of access (as shown below):   My Chart Features   [facility] Primary Care Doctor [facility]  Specialists [facility]  Urgent  Care Non-[facility]  Primary Care  Doctor   Email your healthcare team securely and privately 24/7 X X X X   Manage appointments: schedule your next appointment; view details of past/upcoming appointments X      Request prescription refills. X      View recent personal medical records, including lab and immunizations X X X X   View health record, including health history, allergies, medications X X X X   Read reports about your outpatient visits, procedures, consult and ER notes X X X X   See your discharge summary, which is a recap of your hospital and/or ER visit that includes your diagnosis, lab results, and care plan. X X       How to register for MeetingSense Software:  1. Go to  [email or web address].  2. Click on the Sign Up Now box, which takes you to the New Member Sign Up page. You will need to provide the following information:  a. Enter your MeetingSense Software Access Code exactly as it appears at the top of this page. (You will not need to use this code after you’ve completed the sign-up process. If you do not sign up before the expiration date, you must request a new code.)   b. Enter your date of birth.   c. Enter your home email address.   d. Click Submit, and follow the next screen’s instructions.  3. Create a MeetingSense Software ID. This will  be your [employer] login ID and cannot be changed, so think of one that is secure and easy to remember.  4. Create a [employer] password. You can change your password at any time.  5. Enter your Password Reset Question and Answer. This can be used at a later time if you forget your password.   6. Enter your e-mail address. This allows you to receive e-mail notifications when new information is available in [employer].  7. Click Sign Up. You can now view your health information.    For assistance activating your [employer] account, call [phone]

## 2017-07-18 NOTE — IP AVS SNAPSHOT
7/21/2017    Sandy Yang  [id number] [address] [address]    Dear Sandy:    [facility] wants to ensure your discharge home is safe and you or your loved ones have had all of your questions answered regarding your care after you leave the hospital.    Below is a list of resources and contact information should you have any questions regarding your hospital stay, follow-up instructions, or active medical symptoms.    Questions or Concerns Regarding… Contact   Medical Questions Related to Your Discharge  (7 days a week, 8am-5pm) Contact a Nurse Care Coordinator   [phone]   Medical Questions Not Related to Your Discharge  (24 hours a day / 7 days a week)  Contact the Nurse Health Line   [phone]    Medications or Discharge Instructions Refer to your discharge packet   or contact your [facility] Primary Care Provider   [phone]   Follow-up Appointment(s) Schedule your appointment via Wordy   or contact Scheduling [phone]   Billing Review your statement via Wordy  or contact Billing [phone]   Medical Records Review your records via Wordy   or contact Medical Records [phone]     You may receive a telephone call within two days of discharge. This call is to make certain you understand your discharge instructions and have the opportunity to have any questions answered. You can also easily access your medical information, test results and upcoming appointments via the Wordy free online health management tool. You can learn more and sign up at [employer]/Wordy. For assistance setting up your Wordy account, please call [phone].    Once again, we want to ensure your discharge home is safe and that you have a clear understanding of any next steps in your care. If you have any questions or concerns, please do not hesitate to contact us, we are here for you. Thank you for choosing [facility] for your healthcare needs.    Sincerely,    Your [facility] Healthcare Team

## 2017-07-18 NOTE — PROGRESS NOTES
1445- Patient arrived to Room S331.  Report received from RASHID Rodriguez.  Patient assessment done.  IV patent.  Indwelling catheter to gravity.  Sequential stockings on.  Tegaderm with telfa pad over incision, which is clean, dry, and intact.  FOB at bedside.  1452- Report given to RASHID Purvis, who assumed care of patient.

## 2017-07-18 NOTE — IP AVS SNAPSHOT
Home Care Instructions                                                                                                                Sandy Yang   MRN: [id number]    Department:  POST PARTUM 31   2017           Your appointments     Aug 15, 2017  2:00 PM   NEW TO YOU with Ashleigh Waters M.D.   [facility] [address]    [address]  [address]  [place] [address] [phone]              Follow-up Information     1. Follow up with Olive Valle M.D. In 2 weeks.    Specialty:  OB/Gyn    Contact information    [address]  [address]  [place] [address]  [phone]         I assume responsibility for securing a follow-up  Screening blood test on my baby within the specified date range.    -                  Discharge Instructions       POSTPARTUM DISCHARGE INSTRUCTIONS FOR MOM    YOB: 1995   Age: 21 y.o.               Admit Date: 2017     Discharge Date: 2017  Attending Doctor:  Olive Valle M.D.                  Allergies:  Lidocaine; Morphine; Morphine; Novocain; Rocephin; Zoloft; and Zoloft    Discharged to home by car. Discharged via wheelchair, hospital escort: Yes.  Special equipment needed: Not Applicable  Belongings with: Personal  Be sure to schedule a follow-up appointment with your primary care doctor or any specialists as instructed.     Discharge Plan:   Diet Plan: Discussed  Activity Level: Discussed  Confirmed Follow up Appointment: Patient to Call and Schedule Appointment  Confirmed Symptoms Management: Discussed  Medication Reconciliation Updated: Yes  Influenza Vaccine Indication: Indicated: Not available from distributor/    REASONS TO CALL YOUR OBSTETRICIAN:  1.   Persistent fever or shaking chills (Temperature higher than 100.4)  2.   Heavy bleeding (soaking more than 1 pad per hour); Passing clots  3.   Foul odor from vagina  4.   Mastitis (Breast infection; breast pain, chills, fever, redness)  5.    "Urinary pain, burning or frequency  6.   Episiotomy infection  7.   Abdominal incision infection  8.   Severe depression longer than 24 hours    HAND WASHING  · Prior to handling the baby.  · Before breastfeeding or bottle feeding baby.  · After using the bathroom or changing the baby's diaper.    WOUND CARE  Ask your physician for additional care instructions.  In general:    ·  Incision:      · Keep clean and dry.    · Do NOT lift anything heavier than your baby for up to 6 weeks.    · There should not be any opening or pus.      VAGINAL CARE  · Nothing inside vagina for 6 weeks: no sexual intercourse, tampons or douching.  · Bleeding may continue for 2-4 weeks.  Amount may vary.    · Call your physician for heavy bleeding which means soaking more than 1 pad per hour    BIRTH CONTROL  · It is possible to become pregnant at any time after delivery and while breastfeeding.  · Plan to discuss a method of birth control with your physician at your follow up visit. visit.    DIET AND ELIMINATION  · Eating more fiber (bran cereal, fruits, and vegetables) and drinking plenty of fluids will help to avoid constipation.  · Urinary frequency after childbirth is normal.    POSTPARTUM BLUES  During the first few days after birth, you may experience a sense of the \"blues\" which may include impatience, irritability or even crying.  These feeling come and go quickly.  However, as many as 1 in 10 women experience emotional symptoms known as postpartum depression.    Postpartum depression:  May start as early as the second or third day after delivery or take several weeks or months to develop.  Symptoms of \"blues\" are present, but are more intense:  Crying spells; loss of appetite; feelings of hopelessness or loss of control; fear of touching the baby; over concern or no concern at all about the baby; little or no concern about your own appearance/caring for yourself; and/or inability to sleep or excessive sleeping.  " "Contact your physician if you are experiencing any of these symptoms.    Crisis Hotline:  · [place] Crisis Hotline:  6-334-OOOOUAW  Or 1-239.555.2838  · [place] Crisis Hotline:  1-754.783.2206  Or 292-763-4003    PREVENTING SHAKEN BABY:  If you are angry or stressed, PUT THE BABY IN THE CRIB, step away, take some deep breaths, and wait until you are calm to care for the baby.  DO NOT SHAKE THE BABY.  You are not alone, call a supporter for help.    · Crisis Call Center 24/7 crisis line 024-630-9155 or 1-299.629.5752  · You can also text them, text \"ANSWER\" to 360739    QUIT SMOKING/TOBACCO USE:  I understand the use of any tobacco products increases my chance of suffering from future heart disease and could cause other illnesses which may shorten my life. Quitting the use of tobacco products is the single most important thing I can do to improve my health. For further information on smoking / tobacco cessation call a Toll Free Quit Line at 1-487.103.7139 (*National Cancer [place]) or 1-544.254.6602 (American Lung Association) or you can access the web based program at www.lungusa.org.    · [place] Tobacco Users Help Line:  (799) 611-3177       Toll Free: 1-559.146.3391  · Quit Tobacco Program [facility] (856)008-1573    DEPRESSION / SUICIDE RISK:  As you are discharged from this [facility], it is important to learn how to keep safe from harming yourself.    Recognize the warning signs:  · Abrupt changes in personality, positive or negative- including increase in energy   · Giving away possessions  · Change in eating patterns- significant weight changes-  positive or negative  · Change in sleeping patterns- unable to sleep or sleeping all the time   · Unwillingness or inability to communicate  · Depression  · Unusual sadness, discouragement and loneliness  · Talk of wanting to die  · Neglect of personal appearance   · Rebelliousness- reckless behavior  · Withdrawal from " people/activities they love  · Confusion- inability to concentrate     If you or a loved one observes any of these behaviors or has concerns about self-harm, here's what you can do:  · Talk about it- your feelings and reasons for harming yourself  · Remove any means that you might use to hurt yourself (examples: pills, rope, extension cords, firearm)  · Get professional help from the community (Mental Health, Substance Abuse, psychological counseling)  · Do not be alone:Call your Safe Contact- someone whom you trust who will be there for you.  · Call your local CRISIS HOTLINE 791-7088 or 954-828-6492  · Call your local Children's Mobile Crisis Response Team [place] (161) 496-6538 or www.[employer]  · Call the toll free National Suicide Prevention Hotlines   · National Suicide Prevention Lifeline 085-676-AQTW (6145)  · National Hope Line Network 800-SUICIDE (509-5089)    DISCHARGE SURVEY:  Thank you for choosing [facility].  We hope we provided you with very good care.  You may be receiving a survey in the mail.  Please fill it out.  Your opinion is valuable to us.    ADDITIONAL EDUCATIONAL MATERIALS GIVEN TO PATIENT:        My signature on this form indicates that:  1.  I have reviewed and understand the above information  2.  My questions regarding this information have been answered to my satisfaction.  3.  I have formulated a plan with my discharge nurse to obtain my prescribed medication for home.         Discharge Medication Instructions:    Below are the medications your physician expects you to take upon discharge:    Review all your home medications and newly ordered medications with your doctor and/or pharmacist. Follow medication instructions as directed by your doctor and/or pharmacist.    Please keep your medication list with you and share with your physician.               Medication List      START taking these medications        Instructions    Morning Afternoon Evening Bedtime     ibuprofen 600 MG Tabs   Last time this was given:  600 mg on 7/21/2017  3:32 AM   Commonly known as:  MOTRIN        Take 1 Tab by mouth every 6 hours as needed (For cramping after delivery; do not give if patient is receiving ketorolac (Toradol)).   Dose:  600 mg                        oxycodone-acetaminophen  MG Tabs   Last time this was given:  1 Tab on 7/21/2017  6:32 AM   Commonly known as:  PERCOCET-10        Take 1 Tab by mouth every 3 hours as needed for Severe Pain.   Dose:  1 Tab                          STOP taking these medications     metronidazole 500 MG Tabs   Commonly known as:  FLAGYL               PEPCID PO               PRENATA PO                    Where to Get Your Medications      Information about where to get these medications is not yet available     ! Ask your nurse or doctor about these medications    - ibuprofen 600 MG Tabs  - oxycodone-acetaminophen  MG Tabs            Crisis Hotline:     [place] Crisis Hotline:  0-397-JHDQLXE or 1-988.152.8830    [place] Crisis Hotline:    1-929.305.1364 or 639-618-1542        Disclaimer           _____________________________________                     __________       ________       Patient/Mother Signature or Legal                          Date                   Time

## 2017-07-18 NOTE — H&P
History and Physical    Sandy Yang is a 21 y.o. female  -Para:     Gestational Age:  39w0d  Admitted for:   Repeat  repeat  Admitted to  [facility].  Patient received prenatal care: Dr. Valle    HPI: Patient is admitted with the above mentioned Chief Complaint and States   Loss of fluid:   negative  Abdominal Pain:  negative  Uterine Contractions:  negative  Vaginal Bleeding:  negative  Fetal Movement:  normal  Patient denies fever, chills, nausea, vomiting , headache, visual disturbance, or dysuria  Patient's last menstrual period was 10/17/2016.  Estimated Date of Delivery: 17  Final ALEXANDRA: 2017, Alternate ALEXANDRA Entry    Patient Active Problem List    Diagnosis Date Noted   • Neoplasm of uncertain behavior 2017   • Encounter for initial prescription of contraceptive pills 2016   • Acute vaginitis 2016   • Fetal demise before 22 weeks with retention of dead fetus 2016   • Pregnancy 2016   • Postcoital bleeding 2016   • Screening for STD (sexually transmitted disease) 2016   • Drug rash 2015   • Hypesthesia 2015   • Abnormal urinalysis 2015   • Neck pain 2015   • Fever 2015   • Myalgia 2015   • Viral syndrome 2015   • Chronic tonsillitis 2015   • Major depressive disorder, recurrent, mild (CMS-HCC) 06/15/2015   • Atypical chest pain 2014   • S/P excision of fibroadenoma of breast 2013   • Family planning 2013   • Menstrual irregularity 2013   • Gastritis 11/15/2013   • H/O gastric ulcer 11/15/2013   • Seasonal allergies 2011       Admitting DX: Pregnancy  Labor and delivery, indication for care  Pregnancy Complications:  Previous c/section   OB Risk Factors:   None   Labor State:    Not in labor.    History:   has a past medical history of PUD (peptic ulcer disease) (09); Panic anxiety syndrome (2013); Family planning  "(2013); Indigestion; Atypical chest pain (2014); Stillbirth; Gastric ulcer; and Pregnant.     has past surgical history that includes gastroscopy (2009); primary c section (12); and breast biopsy (2014).    OB History    Para Term  AB SAB TAB Ectopic Multiple Living   2 1 1 0 0 0 0 0  1      # Outcome Date GA Lbr Kian/2nd Weight Sex Delivery Anes PTL Lv   2 Current            1 Term      CS-Unspec   Y      Comments: c section          Medications:  No current facility-administered medications on file prior to encounter.     Current Outpatient Prescriptions on File Prior to Encounter   Medication Sig Dispense Refill   • Famotidine (PEPCID PO) Take  by mouth.     • Prenatal w/o A Vit-Fe Fum-FA (PRENATA PO) Take 1 Tab by mouth every day.     • metronidazole (FLAGYL) 500 MG Tab Take 500 mg by mouth 2 Times a Day. For 7 days  Start date 2017         Allergies:  Lidocaine; Morphine; Morphine; Novocain; Rocephin; Zoloft; and Zoloft    ROS:   Neuro: negative    Cardiovascular: negative  Gastro intestinal: negative  Genitourinary: negative            Physical Exam:  /93 mmHg  Pulse 122  Temp(Src) 37.1 °C (98.8 °F)  Ht 1.651 m (5' 5\")  Wt 86.183 kg (190 lb)  BMI 31.62 kg/m2  LMP 10/17/2016  Constitutional: healthy-appearing, Well-developed, well-nourished  Abdomen: abdomen is soft without significant tenderness, masses, organomegaly or guarding  Extremity: extremities, peripheral pulses and reflexes normal    Cervical Exam: not examined  Fetal Assessment: Fetal heart variability: moderate  Fetal Heart Rate decelerations: none  Fetal Heart Rate accelerations: yes  Baseline FHR: 130 per minute  Estimated Fetal Weight: 2500 - 3000g      Labs:      Prenatal Results         1st Trimester Date Time   ABO  A  17 1448   RH  POS  17 1448   Antibody  NEG  17 1448   CBC/PLT/DIFF      HGB  11.9 g/dL (L) 17 1035   Platelets  221 K/uL 17 1035   HGB A1C   "     1 Hr GCT  93 mg/dL 05/03/17 1035   3 Hr GTT      Rubella  67.10 IU/mL 04/04/17 1448   RPR  Non Reactive  09/23/15 1227   Urine Culture  Mixed skin benjamin <10,000 cfu/mL  04/04/17 1448   24 Urine Protein       24 Urine Creatinine       HBsAg  Negative  04/04/17 1448   Hep CAB   Negative  04/06/16 1011   HIV      Gonorrhea      Chlamydia      TSH       Free T4        TB      Pap      SYPHILUS TREP QUAL [id number] [[id number]][      2nd Trimester Date Time   HCT  37.0 % 05/03/17 1035   HGB  11.9 g/dL (L) 05/03/17 1035   1 Hr GCT  93 mg/dL 05/03/17 1035   3 Hr GTT      24-28 Weeks Date Time   1 Hr GCT   93 mg/dL 05/03/17 1035   TSH       Free T4       24 Urine Protein      24 Urine Creatinine      BUN  10 mg/dL 12/23/16 1858   Creatinine  0.81 mg/dL 12/23/16 1858   GFR  >60 mL/min/1.73 m 2 12/23/16 1858   AST  16 U/L 12/23/16 1858   ALT  13 U/L 12/23/16 1858   Uric Acid      LDH      3rd Trimester Date Time   HCT  37.0 % 05/03/17 1035   HGB  11.9 g/dL (L) 05/03/17 1035   TSH      Free T4      24 Hr Urine Protein      24 Hr Urine Creatinine      SYPHILUS TREP QUAL  Non Reactive  05/03/17 1035   35-37 Weeks Date Time   GBS PCR LB      GBS PCR      Genetic Screening Date Time   Cystic Fibrosis      AFP Quad      Sickle Cell                  View all results for this pregnancy            Assessment:  Gestational Age:  39w0d  Labor State:   Not in labor  Risk Factors:   Previous c/section  Pregnancy Complications: none    Patient Active Problem List    Diagnosis Date Noted   • Neoplasm of uncertain behavior 04/04/2017   • Encounter for initial prescription of contraceptive pills 09/02/2016   • Acute vaginitis 09/02/2016   • Fetal demise before 22 weeks with retention of dead fetus 08/05/2016   • Pregnancy 05/13/2016   • Postcoital bleeding 04/06/2016   • Screening for STD (sexually transmitted disease) 04/06/2016   • Drug rash 12/02/2015   • Hypesthesia 12/02/2015   • Abnormal urinalysis 12/02/2015   • Neck pain 12/02/2015   •  Fever 2015   • Myalgia 2015   • Viral syndrome 2015   • Chronic tonsillitis 2015   • Major depressive disorder, recurrent, mild (CMS-HCC) 06/15/2015   • Atypical chest pain 2014   • S/P excision of fibroadenoma of breast 2013   • Family planning 2013   • Menstrual irregularity 2013   • Gastritis 11/15/2013   • H/O gastric ulcer 11/15/2013   • Seasonal allergies 2011       Plan:   Admitted for: Repeat  repeat  RCS  CBC    Antibiotics:     Olive Valle M.D.

## 2017-07-19 LAB
ERYTHROCYTE [DISTWIDTH] IN BLOOD BY AUTOMATED COUNT: 43.8 FL (ref 35.9–50)
HCT VFR BLD AUTO: 32.2 % (ref 37–47)
HGB BLD-MCNC: 10.5 G/DL (ref 12–16)
MCH RBC QN AUTO: 32.1 PG (ref 27–33)
MCHC RBC AUTO-ENTMCNC: 32.6 G/DL (ref 33.6–35)
MCV RBC AUTO: 98.5 FL (ref 81.4–97.8)
PLATELET # BLD AUTO: 199 K/UL (ref 164–446)
PMV BLD AUTO: 11.2 FL (ref 9–12.9)
RBC # BLD AUTO: 3.27 M/UL (ref 4.2–5.4)
WBC # BLD AUTO: 13.6 K/UL (ref 4.8–10.8)

## 2017-07-19 PROCEDURE — 85027 COMPLETE CBC AUTOMATED: CPT

## 2017-07-19 PROCEDURE — A9270 NON-COVERED ITEM OR SERVICE: HCPCS | Performed by: ANESTHESIOLOGY

## 2017-07-19 PROCEDURE — 700112 HCHG RX REV CODE 229: Performed by: OBSTETRICS & GYNECOLOGY

## 2017-07-19 PROCEDURE — A9270 NON-COVERED ITEM OR SERVICE: HCPCS | Performed by: OBSTETRICS & GYNECOLOGY

## 2017-07-19 PROCEDURE — 700111 HCHG RX REV CODE 636 W/ 250 OVERRIDE (IP): Performed by: ANESTHESIOLOGY

## 2017-07-19 PROCEDURE — 700102 HCHG RX REV CODE 250 W/ 637 OVERRIDE(OP): Performed by: OBSTETRICS & GYNECOLOGY

## 2017-07-19 PROCEDURE — 770002 HCHG ROOM/CARE - OB PRIVATE (112)

## 2017-07-19 PROCEDURE — 36415 COLL VENOUS BLD VENIPUNCTURE: CPT

## 2017-07-19 PROCEDURE — 700102 HCHG RX REV CODE 250 W/ 637 OVERRIDE(OP): Performed by: ANESTHESIOLOGY

## 2017-07-19 RX ORDER — DIPHENHYDRAMINE HCL 25 MG
25 TABLET ORAL EVERY 6 HOURS PRN
Status: DISCONTINUED | OUTPATIENT
Start: 2017-07-19 | End: 2017-07-21 | Stop reason: HOSPADM

## 2017-07-19 RX ORDER — ZOLPIDEM TARTRATE 5 MG/1
5 TABLET ORAL NIGHTLY PRN
Status: DISCONTINUED | OUTPATIENT
Start: 2017-07-19 | End: 2017-07-21 | Stop reason: HOSPADM

## 2017-07-19 RX ORDER — MEPERIDINE HYDROCHLORIDE 25 MG/ML
15 INJECTION INTRAMUSCULAR; INTRAVENOUS; SUBCUTANEOUS EVERY 6 HOURS PRN
Status: DISCONTINUED | OUTPATIENT
Start: 2017-07-19 | End: 2017-07-21 | Stop reason: HOSPADM

## 2017-07-19 RX ORDER — OXYCODONE AND ACETAMINOPHEN 10; 325 MG/1; MG/1
1 TABLET ORAL EVERY 4 HOURS PRN
Status: DISCONTINUED | OUTPATIENT
Start: 2017-07-19 | End: 2017-07-20

## 2017-07-19 RX ORDER — OXYCODONE HYDROCHLORIDE AND ACETAMINOPHEN 5; 325 MG/1; MG/1
1 TABLET ORAL EVERY 4 HOURS PRN
Status: DISCONTINUED | OUTPATIENT
Start: 2017-07-19 | End: 2017-07-21 | Stop reason: HOSPADM

## 2017-07-19 RX ORDER — HYDROCODONE BITARTRATE AND ACETAMINOPHEN 5; 325 MG/1; MG/1
1-2 TABLET ORAL EVERY 4 HOURS PRN
Status: DISCONTINUED | OUTPATIENT
Start: 2017-07-19 | End: 2017-07-21 | Stop reason: HOSPADM

## 2017-07-19 RX ORDER — IBUPROFEN 600 MG/1
600 TABLET ORAL EVERY 6 HOURS PRN
Status: DISCONTINUED | OUTPATIENT
Start: 2017-07-19 | End: 2017-07-21 | Stop reason: HOSPADM

## 2017-07-19 RX ADMIN — DOCUSATE SODIUM 100 MG: 100 CAPSULE ORAL at 19:31

## 2017-07-19 RX ADMIN — BUSPIRONE HYDROCHLORIDE 5 MG: 5 TABLET ORAL at 08:15

## 2017-07-19 RX ADMIN — DIPHENHYDRAMINE HYDROCHLORIDE 12.5 MG: 50 INJECTION, SOLUTION INTRAMUSCULAR; INTRAVENOUS at 02:22

## 2017-07-19 RX ADMIN — OXYCODONE HYDROCHLORIDE AND ACETAMINOPHEN 1 TABLET: 10; 325 TABLET ORAL at 19:31

## 2017-07-19 RX ADMIN — OXYCODONE HYDROCHLORIDE AND ACETAMINOPHEN 1 TABLET: 10; 325 TABLET ORAL at 23:30

## 2017-07-19 RX ADMIN — IBUPROFEN 600 MG: 600 TABLET, FILM COATED ORAL at 15:24

## 2017-07-19 RX ADMIN — IBUPROFEN 600 MG: 600 TABLET, FILM COATED ORAL at 21:33

## 2017-07-19 RX ADMIN — OXYCODONE HYDROCHLORIDE AND ACETAMINOPHEN 1 TABLET: 10; 325 TABLET ORAL at 11:33

## 2017-07-19 RX ADMIN — DIPHENHYDRAMINE HYDROCHLORIDE 25 MG: 50 INJECTION, SOLUTION INTRAMUSCULAR; INTRAVENOUS at 08:30

## 2017-07-19 RX ADMIN — HYDROMORPHONE HYDROCHLORIDE 0.2 MG: 1 INJECTION, SOLUTION INTRAMUSCULAR; INTRAVENOUS; SUBCUTANEOUS at 00:00

## 2017-07-19 RX ADMIN — KETOROLAC TROMETHAMINE 30 MG: 30 INJECTION, SOLUTION INTRAMUSCULAR at 01:37

## 2017-07-19 RX ADMIN — HYDROMORPHONE HYDROCHLORIDE 0.2 MG: 1 INJECTION, SOLUTION INTRAMUSCULAR; INTRAVENOUS; SUBCUTANEOUS at 05:10

## 2017-07-19 RX ADMIN — Medication 1 TABLET: at 08:02

## 2017-07-19 RX ADMIN — OXYCODONE HYDROCHLORIDE AND ACETAMINOPHEN 1 TABLET: 10; 325 TABLET ORAL at 03:50

## 2017-07-19 RX ADMIN — BUSPIRONE HYDROCHLORIDE 5 MG: 5 TABLET ORAL at 21:34

## 2017-07-19 RX ADMIN — HYDROMORPHONE HYDROCHLORIDE 0.2 MG: 1 INJECTION, SOLUTION INTRAMUSCULAR; INTRAVENOUS; SUBCUTANEOUS at 10:17

## 2017-07-19 RX ADMIN — KETOROLAC TROMETHAMINE 30 MG: 30 INJECTION, SOLUTION INTRAMUSCULAR at 08:13

## 2017-07-19 RX ADMIN — OXYCODONE HYDROCHLORIDE AND ACETAMINOPHEN 1 TABLET: 10; 325 TABLET ORAL at 08:01

## 2017-07-19 RX ADMIN — HYDROCODONE BITARTRATE AND ACETAMINOPHEN 2 TABLET: 5; 325 TABLET ORAL at 15:37

## 2017-07-19 RX ADMIN — BUSPIRONE HYDROCHLORIDE 5 MG: 5 TABLET ORAL at 15:24

## 2017-07-19 RX ADMIN — SIMETHICONE CHEW TAB 80 MG 80 MG: 80 TABLET ORAL at 23:31

## 2017-07-19 RX ADMIN — FLUOXETINE HYDROCHLORIDE 40 MG: 20 CAPSULE ORAL at 08:15

## 2017-07-19 ASSESSMENT — PAIN SCALES - GENERAL
PAINLEVEL_OUTOF10: 5
PAINLEVEL_OUTOF10: 7
PAINLEVEL_OUTOF10: 3
PAINLEVEL_OUTOF10: 7
PAINLEVEL_OUTOF10: 5
PAINLEVEL_OUTOF10: 5
PAINLEVEL_OUTOF10: 6
PAINLEVEL_OUTOF10: 3
PAINLEVEL_OUTOF10: 4
PAINLEVEL_OUTOF10: 5
PAINLEVEL_OUTOF10: 7

## 2017-07-19 NOTE — PROGRESS NOTES
POD#1  S/ c/o lots of incisional pain, poor latch, min vb  O/  Filed Vitals:    07/19/17 0200 07/19/17 0300 07/19/17 0400 07/19/17 0500   BP:   117/68    Pulse: 103 87 99 91   Temp:   36.7 °C (98 °F)    Resp: 17 18 18 18   Height:       Weight:       SpO2: 93% 96% 95% 94%     Recent Labs      07/18/17   1215  07/19/17   0345   WBC  12.0*  13.6*   RBC  3.53*  3.27*   HEMOGLOBIN  11.5*  10.5*   HEMATOCRIT  34.8*  32.2*   MCV  98.6*  98.5*   MCH  32.6  32.1   RDW  44.6  43.8   PLATELETCT  212  199   MPV  11.0  11.2   NEUTSPOLYS  79.30*   --    LYMPHOCYTES  14.70*   --    MONOCYTES  5.20   --    EOSINOPHILS  0.10   --    BASOPHILS  0.30   --    gen-nad  Abd-soft, ff below umb  Inc-c/d/i    A&P/POD#1 s/p ltcs  Stable  cpm

## 2017-07-19 NOTE — CARE PLAN
Problem: Altered physiologic condition related to postoperative  delivery  Goal: Patient physiologically stable as evidenced by normal lochia, palpable uterine involution and vital signs within normal limits  Outcome: PROGRESSING AS EXPECTED  Pt's vs stable, fundus is firm and light lochia. Will continue to monitor.    Problem: Potential knowledge deficit related to lack of understanding of self and  care  Goal: Patient will verbalize understanding of self and infant care  Outcome: PROGRESSING AS EXPECTED  Pt verbalizes understanding of self and infant care. Encouraged to call for assistance or with questions and concerns.

## 2017-07-19 NOTE — PROGRESS NOTES
Up to br. C/o severe burning pain. Medicated with dilaudid iv. Still c/o itching medicated at 0830 with benadryl. States doesn't work.

## 2017-07-19 NOTE — CARE PLAN
Problem: Potential for postpartum infection related to surgical incision, compromised uterine condition, urinary tract or respiratory compromise  Goal: Patient will be afebrile and free from signs and symptoms of infection  Outcome: PROGRESSING AS EXPECTED  No signs or symptoms of infection noted    Problem: Potential anxiety related to difficulty adapting to parental role  Goal: Patient will verbalize and demonstrate effective bonding and parenting behavior  Outcome: PROGRESSING SLOWER THAN EXPECTED  Experiencing a great amount of anxiety as stated by pt. Started on prozacand busbar

## 2017-07-20 VITALS
RESPIRATION RATE: 18 BRPM | WEIGHT: 190 LBS | DIASTOLIC BLOOD PRESSURE: 87 MMHG | HEIGHT: 65 IN | HEART RATE: 69 BPM | TEMPERATURE: 98.3 F | OXYGEN SATURATION: 95 % | SYSTOLIC BLOOD PRESSURE: 127 MMHG | BODY MASS INDEX: 31.65 KG/M2

## 2017-07-20 PROCEDURE — A9270 NON-COVERED ITEM OR SERVICE: HCPCS | Performed by: OBSTETRICS & GYNECOLOGY

## 2017-07-20 PROCEDURE — 700102 HCHG RX REV CODE 250 W/ 637 OVERRIDE(OP): Performed by: OBSTETRICS & GYNECOLOGY

## 2017-07-20 PROCEDURE — 700112 HCHG RX REV CODE 229: Performed by: OBSTETRICS & GYNECOLOGY

## 2017-07-20 PROCEDURE — 770002 HCHG ROOM/CARE - OB PRIVATE (112)

## 2017-07-20 RX ORDER — CALCIUM CARBONATE 500 MG/1
500 TABLET, CHEWABLE ORAL 3 TIMES DAILY
Status: DISCONTINUED | OUTPATIENT
Start: 2017-07-20 | End: 2017-07-21 | Stop reason: HOSPADM

## 2017-07-20 RX ORDER — OXYCODONE AND ACETAMINOPHEN 10; 325 MG/1; MG/1
1 TABLET ORAL
Status: DISCONTINUED | OUTPATIENT
Start: 2017-07-20 | End: 2017-07-21 | Stop reason: HOSPADM

## 2017-07-20 RX ADMIN — OXYCODONE HYDROCHLORIDE AND ACETAMINOPHEN 1 TABLET: 10; 325 TABLET ORAL at 18:39

## 2017-07-20 RX ADMIN — BUSPIRONE HYDROCHLORIDE 5 MG: 5 TABLET ORAL at 21:42

## 2017-07-20 RX ADMIN — SIMETHICONE CHEW TAB 80 MG 80 MG: 80 TABLET ORAL at 07:37

## 2017-07-20 RX ADMIN — OXYCODONE HYDROCHLORIDE AND ACETAMINOPHEN 1 TABLET: 10; 325 TABLET ORAL at 11:44

## 2017-07-20 RX ADMIN — OXYCODONE HYDROCHLORIDE AND ACETAMINOPHEN 1 TABLET: 10; 325 TABLET ORAL at 07:32

## 2017-07-20 RX ADMIN — DOCUSATE SODIUM 100 MG: 100 CAPSULE ORAL at 07:38

## 2017-07-20 RX ADMIN — IBUPROFEN 600 MG: 600 TABLET, FILM COATED ORAL at 15:24

## 2017-07-20 RX ADMIN — IBUPROFEN 600 MG: 600 TABLET, FILM COATED ORAL at 21:42

## 2017-07-20 RX ADMIN — OXYCODONE HYDROCHLORIDE AND ACETAMINOPHEN 1 TABLET: 10; 325 TABLET ORAL at 03:30

## 2017-07-20 RX ADMIN — ZOLPIDEM TARTRATE 5 MG: 5 TABLET, FILM COATED ORAL at 00:05

## 2017-07-20 RX ADMIN — Medication 1 TABLET: at 07:31

## 2017-07-20 RX ADMIN — FLUOXETINE HYDROCHLORIDE 40 MG: 20 CAPSULE ORAL at 09:26

## 2017-07-20 RX ADMIN — IBUPROFEN 600 MG: 600 TABLET, FILM COATED ORAL at 09:26

## 2017-07-20 RX ADMIN — ANTACID TABLETS 500 MG: 500 TABLET, CHEWABLE ORAL at 11:44

## 2017-07-20 RX ADMIN — IBUPROFEN 600 MG: 600 TABLET, FILM COATED ORAL at 03:30

## 2017-07-20 RX ADMIN — OXYCODONE HYDROCHLORIDE AND ACETAMINOPHEN 1 TABLET: 10; 325 TABLET ORAL at 21:47

## 2017-07-20 RX ADMIN — DOCUSATE SODIUM 100 MG: 100 CAPSULE ORAL at 21:46

## 2017-07-20 RX ADMIN — OXYCODONE HYDROCHLORIDE AND ACETAMINOPHEN 1 TABLET: 10; 325 TABLET ORAL at 15:26

## 2017-07-20 RX ADMIN — SIMETHICONE CHEW TAB 80 MG 80 MG: 80 TABLET ORAL at 18:44

## 2017-07-20 ASSESSMENT — PAIN SCALES - GENERAL
PAINLEVEL_OUTOF10: 7
PAINLEVEL_OUTOF10: 7
PAINLEVEL_OUTOF10: 5
PAINLEVEL_OUTOF10: 8
PAINLEVEL_OUTOF10: 5
PAINLEVEL_OUTOF10: 4
PAINLEVEL_OUTOF10: 3
PAINLEVEL_OUTOF10: 5
PAINLEVEL_OUTOF10: 7
PAINLEVEL_OUTOF10: 7
PAINLEVEL_OUTOF10: 5
PAINLEVEL_OUTOF10: 4

## 2017-07-20 ASSESSMENT — COPD QUESTIONNAIRES
DO YOU EVER COUGH UP ANY MUCUS OR PHLEGM?: NO/ONLY WITH OCCASIONAL COLDS OR INFECTIONS
HAVE YOU SMOKED AT LEAST 100 CIGARETTES IN YOUR ENTIRE LIFE: NO/DON'T KNOW
COPD SCREENING SCORE: 0
DURING THE PAST 4 WEEKS HOW MUCH DID YOU FEEL SHORT OF BREATH: NONE/LITTLE OF THE TIME

## 2017-07-20 ASSESSMENT — LIFESTYLE VARIABLES: DO YOU DRINK ALCOHOL: NO

## 2017-07-20 NOTE — PROGRESS NOTES
C/o incision pain. Also c/o gas pains. Encouraged pt. To ambulate. Explained the consequences of not ambulating. I.e more severe gas pains or ileus. States she will after next pain meds

## 2017-07-20 NOTE — PROGRESS NOTES
Patient ambulating in hallway. Assessment completed. Fundus is firm, lochia is light. Will continue routine postpartum care.

## 2017-07-20 NOTE — PROGRESS NOTES
POD#2  S.still having a lot of pain, seems focused over right side of incision, min bleeding, baby doing well, breastfeeding going well, passing gas    O.  Filed Vitals:    07/19/17 0500 07/19/17 0800 07/19/17 1200 07/19/17 2000   BP:  117/70 125/79 115/78   Pulse: 91 87 102 78   Temp:  36.8 °C (98.3 °F) 37.1 °C (98.7 °F) 36.7 °C (98 °F)   Resp: 18 17 16 18   Height:       Weight:       SpO2: 94% 95% 97% 96%     gen-tearful with pain  Abd-soft, mild distention  inc-dressing dry and intact    ext-trace alon le edema    A&P/POD#2 s/p repeat LTCS  Stable  REquiring more pain medication than average  Encouraged ambulation

## 2017-07-20 NOTE — CARE PLAN
Problem: Communication  Goal: The ability to communicate needs accurately and effectively will improve  Intervention: Educate patient and significant other/support system about the plan of care, procedures, treatments, medications and allow for questions  Ongoing communication between patient and nursing, to ensure needs/concerns are being met and addressed.      Problem: Alteration in comfort related to surgical incision and/or after birth pains  Goal: Patient is able to ambulate, care for self and infant with acceptable pain level  Outcome: PROGRESSING SLOWER THAN EXPECTED  Patient taking available pain medications, and ambulating short distances.

## 2017-07-20 NOTE — PROGRESS NOTES
Report received from Jimbo MIKE. Patient reporting pain, and very anxious at times, patient hasn't gotten any sleep, wants to take something for sleep. Call to Dr. Moyer for PRN meds for sleep, itching and breakthrough pain.

## 2017-07-20 NOTE — PROGRESS NOTES
Patient medicated for complaints of pain. 7/10. Patient would like medications given to her when they become available. Discussed plan of care. Encouraged to breastfeed every 2-3 hours, to ambulate if able, use incentive spirometer 10x/hr while awake. Plan to breastfeed infant 2 more times before takes ambien. No other questions or concerns. Call light in reach.

## 2017-07-20 NOTE — PROGRESS NOTES
Sore nipples bilaterally, encouraged deeper latch to prevent further nipple damage and improve milk transfer at breast. Encouraged Q 2 hour feeds with optimal position and latch technique.  See lactation flow sheet on infant chart for latch score and details.

## 2017-07-21 PROCEDURE — 700112 HCHG RX REV CODE 229: Performed by: OBSTETRICS & GYNECOLOGY

## 2017-07-21 PROCEDURE — 700111 HCHG RX REV CODE 636 W/ 250 OVERRIDE (IP): Performed by: OBSTETRICS & GYNECOLOGY

## 2017-07-21 PROCEDURE — A9270 NON-COVERED ITEM OR SERVICE: HCPCS | Performed by: OBSTETRICS & GYNECOLOGY

## 2017-07-21 PROCEDURE — 700102 HCHG RX REV CODE 250 W/ 637 OVERRIDE(OP): Performed by: OBSTETRICS & GYNECOLOGY

## 2017-07-21 RX ORDER — ONDANSETRON 4 MG/1
4 TABLET, ORALLY DISINTEGRATING ORAL EVERY 4 HOURS PRN
Status: DISCONTINUED | OUTPATIENT
Start: 2017-07-21 | End: 2017-07-21 | Stop reason: HOSPADM

## 2017-07-21 RX ORDER — OXYCODONE AND ACETAMINOPHEN 10; 325 MG/1; MG/1
1 TABLET ORAL
Qty: 40 TAB | Refills: 0 | Status: SHIPPED | OUTPATIENT
Start: 2017-07-21 | End: 2017-08-15

## 2017-07-21 RX ORDER — IBUPROFEN 600 MG/1
600 TABLET ORAL EVERY 6 HOURS PRN
Qty: 30 TAB | Refills: 1 | Status: SHIPPED | OUTPATIENT
Start: 2017-07-21 | End: 2020-10-03

## 2017-07-21 RX ADMIN — SIMETHICONE CHEW TAB 80 MG 80 MG: 80 TABLET ORAL at 00:44

## 2017-07-21 RX ADMIN — Medication 1 TABLET: at 08:12

## 2017-07-21 RX ADMIN — FLUOXETINE HYDROCHLORIDE 40 MG: 20 CAPSULE ORAL at 08:12

## 2017-07-21 RX ADMIN — OXYCODONE HYDROCHLORIDE AND ACETAMINOPHEN 1 TABLET: 10; 325 TABLET ORAL at 12:45

## 2017-07-21 RX ADMIN — ANTACID TABLETS 500 MG: 500 TABLET, CHEWABLE ORAL at 08:13

## 2017-07-21 RX ADMIN — OXYCODONE HYDROCHLORIDE AND ACETAMINOPHEN 1 TABLET: 10; 325 TABLET ORAL at 03:35

## 2017-07-21 RX ADMIN — DOCUSATE SODIUM 100 MG: 100 CAPSULE ORAL at 08:13

## 2017-07-21 RX ADMIN — OXYCODONE HYDROCHLORIDE AND ACETAMINOPHEN 1 TABLET: 10; 325 TABLET ORAL at 09:44

## 2017-07-21 RX ADMIN — IBUPROFEN 600 MG: 600 TABLET, FILM COATED ORAL at 09:44

## 2017-07-21 RX ADMIN — BUSPIRONE HYDROCHLORIDE 5 MG: 5 TABLET ORAL at 15:46

## 2017-07-21 RX ADMIN — SIMETHICONE CHEW TAB 80 MG 80 MG: 80 TABLET ORAL at 06:32

## 2017-07-21 RX ADMIN — ONDANSETRON 4 MG: 4 TABLET, ORALLY DISINTEGRATING ORAL at 08:13

## 2017-07-21 RX ADMIN — ANTACID TABLETS 500 MG: 500 TABLET, CHEWABLE ORAL at 15:46

## 2017-07-21 RX ADMIN — SIMETHICONE CHEW TAB 80 MG 80 MG: 80 TABLET ORAL at 12:45

## 2017-07-21 RX ADMIN — ONDANSETRON 4 MG: 4 TABLET, ORALLY DISINTEGRATING ORAL at 03:32

## 2017-07-21 RX ADMIN — BUSPIRONE HYDROCHLORIDE 5 MG: 5 TABLET ORAL at 08:13

## 2017-07-21 RX ADMIN — OXYCODONE HYDROCHLORIDE AND ACETAMINOPHEN 1 TABLET: 10; 325 TABLET ORAL at 06:32

## 2017-07-21 RX ADMIN — IBUPROFEN 600 MG: 600 TABLET, FILM COATED ORAL at 03:32

## 2017-07-21 RX ADMIN — ANTACID TABLETS 500 MG: 500 TABLET, CHEWABLE ORAL at 00:50

## 2017-07-21 RX ADMIN — OXYCODONE HYDROCHLORIDE AND ACETAMINOPHEN 1 TABLET: 10; 325 TABLET ORAL at 00:44

## 2017-07-21 ASSESSMENT — PAIN SCALES - GENERAL
PAINLEVEL_OUTOF10: 7
PAINLEVEL_OUTOF10: 0
PAINLEVEL_OUTOF10: 5
PAINLEVEL_OUTOF10: 7
PAINLEVEL_OUTOF10: 4
PAINLEVEL_OUTOF10: 7
PAINLEVEL_OUTOF10: 7
PAINLEVEL_OUTOF10: 5
PAINLEVEL_OUTOF10: 6
PAINLEVEL_OUTOF10: 0

## 2017-07-21 NOTE — CARE PLAN
Problem: Altered physiologic condition related to postoperative  delivery  Goal: Patient physiologically stable as evidenced by normal lochia, palpable uterine involution and vital signs within normal limits  Outcome: PROGRESSING AS EXPECTED   Patient in stable condition, vitals WDL, lochia light, and fundus firm.     Problem: Potential for postpartum infection related to surgical incision, compromised uterine condition, urinary tract or respiratory compromise  Goal: Patient will be afebrile and free from signs and symptoms of infection  Outcome: PROGRESSING AS EXPECTED   Patient is free from any s/s of infection at this time. All vitals are WDL. Patient does not appear to be in any kind of distress at this time. Will continue to monitor.

## 2017-07-21 NOTE — PROGRESS NOTES
Observed MOB attempting to latch infant to right breast. Infant is screaming at the breast. Offered to lightly swaddle infant's upper body, to keep infant's arms out of mouth. Swollen breasts noted, encouraged MOB to hand express into towel to soften tissue, so infant can get a better latch. After hand expression of some breastmilk, Infant able to sustain a latch for longer period of time.

## 2017-07-21 NOTE — DISCHARGE PLANNING
:    Referral: Anxiety and depression.    Intervention:  Reviewed medical record and met with DUY who delivered her second daughter.  The FOB is involved and supportive.  Verified MOB's phone number and address which is [address] [facility] [place] [address].  Phone number is [phone].  Parents are prepared for infant and are receiving WIC.  DUY stated she was started on Prozac and Buspar during this hospitalization and plans on following up with her physician.  Provided mother with a list of pediatricians, children's resource list, and a diaper bank referral.  No further needs at this time.    Plan:  Infant is cleared to discharge home with DUY.

## 2017-07-21 NOTE — PROGRESS NOTES
Assumed care of patient and assessment complete. Patient in stable condition, vitals WDL, fundus firm, lochia light. Patient complaining of feeling dizzy and sweaty, given anti-nausea, cold wash cloth, and ice to feel more comfortable. Discussed plan of care for the day, patient comfortable breast feeding. Call light in reach, bed in lowest position, encouraged to call if assistance is needed.

## 2017-07-21 NOTE — DISCHARGE SUMMARY
Discharge Summary:      Sandy Yang      Admit Date:   2017  Discharge Date:  2017     Admitting diagnosis:  Pregnancy  Labor and delivery, indication for care  Discharge Diagnosis: Status post  for repeat.  Pregnancy Complications: none  Tubal Ligation:  no        History:  Past Medical History   Diagnosis Date   • PUD (peptic ulcer disease) 09   • Panic anxiety syndrome 2013     associated with postpartum depression   • Family planning 2013   • Indigestion    • Atypical chest pain 2014   • Stillbirth    • Gastric ulcer    • Pregnant      OB History    Para Term  AB SAB TAB Ectopic Multiple Living   2 1 1 0 0 0 0 0  1      # Outcome Date GA Lbr Kian/2nd Weight Sex Delivery Anes PTL Lv   2 Current            1 Term      CS-Unspec   Y      Comments: c section           Lidocaine; Morphine; Morphine; Novocain; Rocephin; Zoloft; and Zoloft  Patient Active Problem List    Diagnosis Date Noted   • Neoplasm of uncertain behavior 2017   • Encounter for initial prescription of contraceptive pills 2016   • Acute vaginitis 2016   • Fetal demise before 22 weeks with retention of dead fetus 2016   • Pregnancy 2016   • Postcoital bleeding 2016   • Screening for STD (sexually transmitted disease) 2016   • Drug rash 2015   • Hypesthesia 2015   • Abnormal urinalysis 2015   • Neck pain 2015   • Fever 2015   • Myalgia 2015   • Viral syndrome 2015   • Chronic tonsillitis 2015   • Major depressive disorder, recurrent, mild (CMS-HCC) 06/15/2015   • Atypical chest pain 2014   • S/P excision of fibroadenoma of breast 2013   • Family planning 2013   • Menstrual irregularity 2013   • Gastritis 11/15/2013   • H/O gastric ulcer 11/15/2013   • Seasonal allergies 2011        Hospital Course:   21 y.o. , now para 2, was admitted with the above mentioned  diagnosis, underwent Repeat  repeat,  for repeat. Patient postpartum course was unremarkable, with progressive advancement in diet , ambulation and toleration of oral analgesia. Patient without complaints today and desires discharge.      Filed Vitals:    17 0800 17 1200 17 2000 17   BP: 117/70 125/79 115/78 127/87   Pulse: 87 102 78 69   Temp: 36.8 °C (98.3 °F) 37.1 °C (98.7 °F) 36.7 °C (98 °F) 36.8 °C (98.3 °F)   Resp: 17 16 18 18   Height:       Weight:       SpO2: 95% 97% 96% 95%       Current Facility-Administered Medications   Medication Dose   • ondansetron (ZOFRAN ODT) dispertab 4 mg  4 mg   • calcium carbonate (TUMS) chewable tab 500 mg  500 mg   • oxycodone-acetaminophen (PERCOCET-10)  MG per tablet 1 Tab  1 Tab   • ibuprofen (MOTRIN) tablet 600 mg  600 mg   • oxycodone-acetaminophen (PERCOCET) 5-325 MG per tablet 1 Tab  1 Tab   • hydrocodone-acetaminophen (NORCO) 5-325 MG per tablet 1-2 Tab  1-2 Tab   • meperidine (DEMEROL) injection 15 mg  15 mg   • zolpidem (AMBIEN) tablet 5 mg  5 mg   • diphenhydrAMINE (BENADRYL) tablet/capsule 25 mg  25 mg   • LR infusion     • PRN oxytocin (PITOCIN) (20 Units/1000 mL) PRN for excessive uterine bleeding - See Admin Instr  125-999 mL/hr   • misoprostol (CYTOTEC) tablet 600 mcg  600 mcg   • methylergonovine (METHERGINE) injection 0.2 mg  0.2 mg   • docusate sodium (COLACE) capsule 100 mg  100 mg   • bisacodyl (DULCOLAX) suppository 10 mg  10 mg   • simethicone (MYLICON) chewable tab 80 mg  80 mg   • prenatal plus vitamin (STUARTNATAL 1+1) 27-1 MG tablet 1 Tab  1 Tab   • acetaminophen (TYLENOL) tablet 325 mg  325 mg   • busPIRone (BUSPAR) tablet 5 mg  5 mg   • fluoxetine (PROZAC) capsule 40 mg  40 mg       Exam:  Breast Exam: negative  Abdomen: Abdomen soft, non-tender. BS normal. No masses,  No organomegaly  Fundus Non Tender: yes  Incision: dry and intact  Perineum: perineum intact  Extremity: extremities, peripheral  pulses and reflexes normal, no edema, redness or tenderness in the calves or thighs     Labs:  Recent Labs      07/18/17   1215  07/19/17   0345   WBC  12.0*  13.6*   RBC  3.53*  3.27*   HEMOGLOBIN  11.5*  10.5*   HEMATOCRIT  34.8*  32.2*   MCV  98.6*  98.5*   MCH  32.6  32.1   MCHC  33.0*  32.6*   RDW  44.6  43.8   PLATELETCT  212  199   MPV  11.0  11.2        Activity:   Discharge to home  Pelvic Rest x 6 weeks    Assessment:  Course complicated by difficult pain control, now meeting all d/c criteria  Discharge Assessment: stable for d/c; reviewed warning signs     Follow up: .[facility] or [facility] in 5 weeks for vaginal ; 1 week for incision check.      Discharge Meds:   Current Outpatient Prescriptions   Medication Sig Dispense Refill   • oxycodone-acetaminophen (PERCOCET-10)  MG Tab Take 1 Tab by mouth every 3 hours as needed for Severe Pain. 40 Tab 0   • ibuprofen (MOTRIN) 600 MG Tab Take 1 Tab by mouth every 6 hours as needed (For cramping after delivery; do not give if patient is receiving ketorolac (Toradol)). 30 Tab 1       Olive Valle M.D.

## 2017-07-21 NOTE — DISCHARGE INSTRUCTIONS
POSTPARTUM DISCHARGE INSTRUCTIONS FOR MOM    YOB: 1995   Age: 21 y.o.               Admit Date: 2017     Discharge Date: 2017  Attending Doctor:  Olive Valle M.D.                  Allergies:  Lidocaine; Morphine; Morphine; Novocain; Rocephin; Zoloft; and Zoloft    Discharged to home by car. Discharged via wheelchair, hospital escort: Yes.  Special equipment needed: Not Applicable  Belongings with: Personal  Be sure to schedule a follow-up appointment with your primary care doctor or any specialists as instructed.     Discharge Plan:   Diet Plan: Discussed  Activity Level: Discussed  Confirmed Follow up Appointment: Patient to Call and Schedule Appointment  Confirmed Symptoms Management: Discussed  Medication Reconciliation Updated: Yes  Influenza Vaccine Indication: Indicated: Not available from distributor/    REASONS TO CALL YOUR OBSTETRICIAN:  1.   Persistent fever or shaking chills (Temperature higher than 100.4)  2.   Heavy bleeding (soaking more than 1 pad per hour); Passing clots  3.   Foul odor from vagina  4.   Mastitis (Breast infection; breast pain, chills, fever, redness)  5.   Urinary pain, burning or frequency  6.   Episiotomy infection  7.   Abdominal incision infection  8.   Severe depression longer than 24 hours    HAND WASHING  · Prior to handling the baby.  · Before breastfeeding or bottle feeding baby.  · After using the bathroom or changing the baby's diaper.    WOUND CARE  Ask your physician for additional care instructions.  In general:    ·  Incision:      · Keep clean and dry.    · Do NOT lift anything heavier than your baby for up to 6 weeks.    · There should not be any opening or pus.      VAGINAL CARE  · Nothing inside vagina for 6 weeks: no sexual intercourse, tampons or douching.  · Bleeding may continue for 2-4 weeks.  Amount may vary.    · Call your physician for heavy bleeding which means soaking more than 1 pad per hour    BIRTH  "CONTROL  · It is possible to become pregnant at any time after delivery and while breastfeeding.  · Plan to discuss a method of birth control with your physician at your follow up visit. visit.    DIET AND ELIMINATION  · Eating more fiber (bran cereal, fruits, and vegetables) and drinking plenty of fluids will help to avoid constipation.  · Urinary frequency after childbirth is normal.    POSTPARTUM BLUES  During the first few days after birth, you may experience a sense of the \"blues\" which may include impatience, irritability or even crying.  These feeling come and go quickly.  However, as many as 1 in 10 women experience emotional symptoms known as postpartum depression.    Postpartum depression:  May start as early as the second or third day after delivery or take several weeks or months to develop.  Symptoms of \"blues\" are present, but are more intense:  Crying spells; loss of appetite; feelings of hopelessness or loss of control; fear of touching the baby; over concern or no concern at all about the baby; little or no concern about your own appearance/caring for yourself; and/or inability to sleep or excessive sleeping.  Contact your physician if you are experiencing any of these symptoms.    Crisis Hotline:  · [place] Crisis Hotline:  5-947-PDZKOQK  Or 1-552.766.5814  · [place] Crisis Hotline:  1-513.941.1951  Or 072-763-7402    PREVENTING SHAKEN BABY:  If you are angry or stressed, PUT THE BABY IN THE CRIB, step away, take some deep breaths, and wait until you are calm to care for the baby.  DO NOT SHAKE THE BABY.  You are not alone, call a supporter for help.    · Crisis Call Center 24/7 crisis line 461-577-1278 or 1-545.139.9297  · You can also text them, text \"ANSWER\" to 881189    QUIT SMOKING/TOBACCO USE:  I understand the use of any tobacco products increases my chance of suffering from future heart disease and could cause other illnesses which may shorten my life. Quitting the use of tobacco products " is the single most important thing I can do to improve my health. For further information on smoking / tobacco cessation call a Toll Free Quit Line at 1-120.853.7024 (*National Cancer Red [place]) or 1-412.175.4799 (American Lung Association) or you can access the web based program at www.lungusa.org.    · [place] Tobacco Users Help Line:  (900) 848-3327       Toll Free: 1-946.867.7559  · Quit Tobacco Program [facility] (592)969-6233    DEPRESSION / SUICIDE RISK:  As you are discharged from this [facility], it is important to learn how to keep safe from harming yourself.    Recognize the warning signs:  · Abrupt changes in personality, positive or negative- including increase in energy   · Giving away possessions  · Change in eating patterns- significant weight changes-  positive or negative  · Change in sleeping patterns- unable to sleep or sleeping all the time   · Unwillingness or inability to communicate  · Depression  · Unusual sadness, discouragement and loneliness  · Talk of wanting to die  · Neglect of personal appearance   · Rebelliousness- reckless behavior  · Withdrawal from people/activities they love  · Confusion- inability to concentrate     If you or a loved one observes any of these behaviors or has concerns about self-harm, here's what you can do:  · Talk about it- your feelings and reasons for harming yourself  · Remove any means that you might use to hurt yourself (examples: pills, rope, extension cords, firearm)  · Get professional help from the community (Mental Health, Substance Abuse, psychological counseling)  · Do not be alone:Call your Safe Contact- someone whom you trust who will be there for you.  · Call your local CRISIS HOTLINE 993-2061 or 732-133-5644  · Call your local Children's Mobile Crisis Response Team [place] (545) 099-5558 or www.Ligandal  · Call the toll free National Suicide Prevention Hotlines   · National Suicide Prevention  Lifeline 687-369-VUGF (9784)  · [facility] 800-SUICIDE (935-6698)    DISCHARGE SURVEY:  Thank you for choosing [facility].  We hope we provided you with very good care.  You may be receiving a survey in the mail.  Please fill it out.  Your opinion is valuable to us.    ADDITIONAL EDUCATIONAL MATERIALS GIVEN TO PATIENT:        My signature on this form indicates that:  1.  I have reviewed and understand the above information  2.  My questions regarding this information have been answered to my satisfaction.  3.  I have formulated a plan with my discharge nurse to obtain my prescribed medication for home.

## 2017-07-21 NOTE — PROGRESS NOTES
Received report from day shift, RN. Patient in bed. Complains of pain at incision site on the left side. Patient given heat pack. Patient states that it is helping. Will continue to monitor.

## 2017-07-21 NOTE — PROGRESS NOTES
"Mother with c/o engorgement, on assessment no engorgement noted, breasts filling but soft and pliable, no s/s of infection noted.    Mother denies pain/need for assistance with BF, discussed \"normal\" course of BF, discussed outpatient assistance available at [facility].    Mother has pump for home use, discussed pumping and encouraged until closer to return to school, educated on effective pumping schedule for when she does begin pumping, encouraged to make appointment for outpatient consult if need for assistance with pumping when returns home.    "

## 2017-07-21 NOTE — PROGRESS NOTES
C/o severe pain in lower left abd . States its sharp, its gets worse when walking states passing gas and does not feel like a gas bubble. Would like to increase pain meds. States she had trouble with pain control with her other delivery. Cannot remember what she eventually took that helped. Dr Mata notified. Orders received to increase percocet frequency

## 2017-08-15 ENCOUNTER — OFFICE VISIT (OUTPATIENT)
Dept: MEDICAL GROUP | Facility: MEDICAL CENTER | Age: 22
End: 2017-08-15
Payer: COMMERCIAL

## 2017-08-15 VITALS
BODY MASS INDEX: 27.99 KG/M2 | OXYGEN SATURATION: 95 % | TEMPERATURE: 98.6 F | HEIGHT: 65 IN | SYSTOLIC BLOOD PRESSURE: 112 MMHG | WEIGHT: 168 LBS | RESPIRATION RATE: 14 BRPM | HEART RATE: 100 BPM | DIASTOLIC BLOOD PRESSURE: 70 MMHG

## 2017-08-15 DIAGNOSIS — F33.0 MAJOR DEPRESSIVE DISORDER, RECURRENT, MILD (HCC): ICD-10-CM

## 2017-08-15 PROBLEM — D48.9 NEOPLASM OF UNCERTAIN BEHAVIOR: Status: RESOLVED | Noted: 2017-04-04 | Resolved: 2017-08-15

## 2017-08-15 PROCEDURE — 99214 OFFICE O/P EST MOD 30 MIN: CPT | Performed by: FAMILY MEDICINE

## 2017-08-15 RX ORDER — FLUOXETINE HYDROCHLORIDE 40 MG/1
40 CAPSULE ORAL DAILY
COMMUNITY
End: 2018-02-12 | Stop reason: SDUPTHER

## 2017-08-15 RX ORDER — BUSPIRONE HYDROCHLORIDE 5 MG/1
5 TABLET ORAL 3 TIMES DAILY
COMMUNITY
End: 2019-10-29 | Stop reason: SDUPTHER

## 2017-08-15 NOTE — PROGRESS NOTES
cc: Depression    Subjective:     Sandy Yang is a 21 y.o. female presenting to establish care. She reports she isn't feeling quite depressed for the past 2 weeks. She recently gave birth about 4 weeks ago. During the pregnancy, she was off her antidepressants. It was a bit of a struggle, however she was able to cope with that. After delivery, she resumed her fluoxetine 40 mg daily and BuSpar 5 mg 3 times a day. Was initially working quite well. However, for the past 2-2 and half weeks, doesn't seem to be helping as much. She has been going to couples counseling. She is quite stressed at home, is the primary caregiver for her daughter and her older 5-year-old daughter. Her boyfriend works long hours most days, she doesn't have much help at home. She is also planning to go back to school soon. She reports feeling somewhat down, poor energy, poor appetite. Is nervous most days, worries, can't relax, is very irritable. PHQ9 is 3 and a HENRRY 7 is 6 today; EPDS is 9.  She is breastfeeding. She has been on Zoloft and Lexapro in the past.    Review of systems:  See above.        Current outpatient prescriptions:   •  fluoxetine (PROZAC) 40 MG capsule, Take 40 mg by mouth every day., Disp: , Rfl:   •  busPIRone (BUSPAR) 5 MG Tab, Take 5 mg by mouth 3 times a day., Disp: , Rfl:   •  ibuprofen (MOTRIN) 600 MG Tab, Take 1 Tab by mouth every 6 hours as needed (For cramping after delivery; do not give if patient is receiving ketorolac (Toradol))., Disp: 30 Tab, Rfl: 1    Allergies   Allergen Reactions   • Lidocaine    • Morphine Rash     Generalized itching and redness.    • Novocain      Used during dental surgery; had no affect toward deadening her pain   • Rocephin [Ceftriaxone] Rash     Itching     • Zoloft Shortness of Breath     Panic        Past Medical History   Diagnosis Date   • PUD (peptic ulcer disease) 12/23/09   • Panic anxiety syndrome 7/8/2013     associated with postpartum depression   • Family  "planning 11/16/2013   • Indigestion    • Atypical chest pain 11/21/2014   • Stillbirth    • Gastric ulcer    • Pregnant    • Chronic tonsillitis 7/8/2015   • Fetal demise before 22 weeks with retention of dead fetus 8/5/2016     Past Surgical History   Procedure Laterality Date   • Gastroscopy  12/23/2009     Performed by SUSANA MUÑOZ at SURGERY [facility] ORS   • Primary c section  8/9/12     for breech   • Breast biopsy  2/19/2014     Performed by Reid Adorno M.D. at SURGERY SAME DAY [facility] ORS   • Repeat c section  7/18/2017     Procedure: REPEAT C SECTION;  Surgeon: Olive Valle M.D.;  Location: LABOR AND DELIVERY;  Service:      Family History   Problem Relation Age of Onset   • Cancer Paternal Grandmother      lung cancer   • Heart Disease Maternal Uncle      MI age 45   • Cancer Father      HPV     Social History     Social History   • Marital Status: Single     Spouse Name: lives with BF   • Number of Children: 1   • Years of Education: N/A     Occupational History   •       Social History Main Topics   • Smoking status: Never Smoker    • Smokeless tobacco: Never Used   • Alcohol Use: No      Comment: occasional; not while pregnant   • Drug Use: No   • Sexual Activity:     Partners: Male     Birth Control/ Protection: Pill     Other Topics Concern   • Not on file     Social History Narrative    mom is Keara Johnston.     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 Has 14 month old. Lives with BF    2017: studying for her associates. 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 Yes  General: Alert, pleasant, NAD  HEENT: Normocephalic.    Psych:  Affect/mood is normal, judgement is good, memory is intact, grooming is appropriate.    PHQ9 is 3   HENRRY 7 is 6, somewhat difficult    EPDS is " 9      Assessment/Plan:     Diagnoses and all orders for this visit:    Major depressive disorder, recurrent, mild (CMS-HCC)  Post partum depression  [place]  depression scale is a little elevated. No suicidal thoughts. Discussed her options. Recommended personal counseling, the patient is unsure if she will have time to do this. Her other option could be to increase the buspirone to 10 mg twice a day. Her third option could be to switch to Wellbutrin as she has been on Zoloft, Lexapro and fluoxetine. She would like to think about her options for now, she will mychart message me. Otherwise follow-up in 1-3 months    Patient was seen for a total of 30 minutes face-to-face, with more than half of the time spent counseling or coordinating care regarding the above mentioned problems.       Return in about 4 weeks (around 2017) for Med check.

## 2017-08-15 NOTE — MR AVS SNAPSHOT
"        Sandy Yang   8/15/2017 2:00 PM   Office Visit   MRN: [id number]    Department:  [address] [place] [address]   Dept Phone:  [phone]    Description:  Female : 1995   Provider:  Ashleigh Waters M.D.           Allergies as of 8/15/2017     Allergen Noted Reactions    Lidocaine 2017       Morphine 2016   Rash    Generalized itching and redness.     Novocain 2014       Used during dental surgery; had no affect toward deadening her pain    Rocephin [Ceftriaxone] 2015   Rash    Itching      Zoloft 2011   Shortness of Breath    Panic       You were diagnosed with     Major depressive disorder, recurrent, mild (CMS-HCC)   [409594]       Post partum depression   [771173]         Vital Signs     Blood Pressure Pulse Temperature Respirations Height Weight    112/70 mmHg 100 37 °C (98.6 °F) 14 1.651 m (5' 5\") 76.204 kg (168 lb)    Body Mass Index Oxygen Saturation Last Menstrual Period Breastfeeding? Smoking Status       27.96 kg/m2 95% 2017 Yes Never Smoker        Basic Information     Date Of Birth Sex Race Ethnicity Preferred Language    1995 Female White Non- English      Your appointments     Sep 12, 2017  2:00 PM   Established Patient with Ashleigh Waters M.D.   [address] ([address])    [address] [place] [address]   [phone]           You will be receiving a confirmation call a few days before your appointment from our automated call confirmation system.              Problem List              ICD-10-CM Priority Class Noted - Resolved    Seasonal allergies J30.2   3/22/2011 - Present    H/O gastric ulcer Z87.19   11/15/2013 - Present    S/P excision of fibroadenoma of breast Z98.890, Z86.018   2013 - Present    Major depressive disorder, recurrent, mild (CMS-HCC) F33.0   6/15/2015 - Present      Health Maintenance        Date Due Completion Dates    IMM HEP A VACCINE (1 of 2 - Standard Series) " 4/6/2018 (Originally 11/7/1994) ---    IMM HEP B VACCINE (3 of 3 - Primary Series) 4/6/2018 (Originally 8/4/1997) 5/14/1997, 4/14/1997    IMM VARICELLA (CHICKENPOX) VACCINE (1 of 2 - 2 Dose Adolescent Series) 4/6/2018 (Originally 11/7/2006) ---    IMM INFLUENZA (1) 9/1/2017 10/20/2015, 11/13/2014, 11/12/2014, 1/3/2014    PAP SMEAR 7/6/2019 7/6/2016, 2/17/2015    IMM DTaP/Tdap/Td Vaccine (6 - Td) 9/22/2025 9/22/2015, 5/3/1995, 9/8/1994, 7/1/1994, 3/15/1994            Current Immunizations     Dtap Vaccine 5/3/1995, 9/8/1994, 7/1/1994, 3/15/1994    HIB Vaccine (ACTHIB/HIBERIX) 5/3/1995, 9/8/1994, 7/1/1994, 3/15/1994    HPV Quadrivalent Vaccine (GARDASIL) 5/21/2014, 3/11/2014, 7/10/2008    Hepatitis B Vaccine Non-Recombivax (Ped/Adol) 5/14/1997, 4/14/1997    IPV 5/3/1995, 9/8/1994, 7/1/1994, 3/15/1994    Influenza TIV (IM) 11/13/2014, 1/3/2014    Influenza Vaccine Quad Inj (Pf) 11/12/2014    Influenza Vaccine Quad Inj (Preserved) 10/20/2015  1:30 AM    MMR Vaccine 5/3/1995    Tdap Vaccine 9/22/2015      Below and/or attached are the medications your provider expects you to take. Review all of your home medications and newly ordered medications with your provider and/or pharmacist. Follow medication instructions as directed by your provider and/or pharmacist. Please keep your medication list with you and share with your provider. Update the information when medications are discontinued, doses are changed, or new medications (including over-the-counter products) are added; and carry medication information at all times in the event of emergency situations     Allergies:  LIDOCAINE - (reactions not documented)     MORPHINE - Rash     NOVOCAIN - (reactions not documented)     ROCEPHIN - Rash     ZOLOFT - Shortness of Breath               Medications  Valid as of: August 15, 2017 -  2:33 PM    Generic Name Brand Name Tablet Size Instructions for use    BusPIRone HCl (Tab) BUSPAR 5 MG Take 5 mg by mouth 3 times a day.         FLUoxetine HCl (Cap) PROZAC 40 MG Take 40 mg by mouth every day.        Ibuprofen (Tab) MOTRIN 600 MG Take 1 Tab by mouth every 6 hours as needed (For cramping after delivery; do not give if patient is receiving ketorolac (Toradol)).        .                 Medicines prescribed today were sent to:     [facility] [address] [facility] ([place] [place] - [address] [place] [address] [facility] ([place] [place] [address]    Phone: [phone] Fax: [phone]    Open 24 Hours?: No      Medication refill instructions:       If your prescription bottle indicates you have medication refills left, it is not necessary to call your provider’s office. Please contact your pharmacy and they will refill your medication.    If your prescription bottle indicates you do not have any refills left, you may request refills at any time through one of the following ways: The online [employer]ffer system (except Urgent Care), by calling your provider’s office, or by asking your pharmacy to contact your provider’s office with a refill request. Medication refills are processed only during regular business hours and may not be available until the next business day. Your provider may request additional information or to have a follow-up visit with you prior to refilling your medication.   *Please Note: Medication refills are assigned a new Rx number when refilled electronically. Your pharmacy may indicate that no refills were authorized even though a new prescription for the same medication is available at the pharmacy. Please request the medicine by name with the pharmacy before contacting your provider for a refill.           IdeaOffer Access Code: Activation code not generated  Current IdeaOffer Status: Active

## 2017-11-16 DIAGNOSIS — Z23 NEED FOR VACCINATION: ICD-10-CM

## 2017-11-16 DIAGNOSIS — Z11.59 SCREENING FOR RUBELLA: ICD-10-CM

## 2017-11-16 DIAGNOSIS — Z11.59 MEASLES SCREENING: ICD-10-CM

## 2017-11-17 ENCOUNTER — HOSPITAL ENCOUNTER (OUTPATIENT)
Dept: LAB | Facility: MEDICAL CENTER | Age: 22
End: 2017-11-17
Attending: FAMILY MEDICINE
Payer: COMMERCIAL

## 2017-11-17 DIAGNOSIS — Z23 NEED FOR VACCINATION: ICD-10-CM

## 2017-11-17 DIAGNOSIS — Z11.59 SCREENING FOR RUBELLA: ICD-10-CM

## 2017-11-17 DIAGNOSIS — Z11.59 MEASLES SCREENING: ICD-10-CM

## 2017-11-17 PROCEDURE — 86762 RUBELLA ANTIBODY: CPT

## 2017-11-17 PROCEDURE — 86735 MUMPS ANTIBODY: CPT

## 2017-11-17 PROCEDURE — 36415 COLL VENOUS BLD VENIPUNCTURE: CPT

## 2017-11-17 PROCEDURE — 86765 RUBEOLA ANTIBODY: CPT

## 2017-11-20 LAB
MEV IGG SER IA-ACNC: POSITIVE
MUV IGG SER IA-ACNC: NORMAL
RUBV AB SER QL: 74.1 IU/ML

## 2017-11-21 ENCOUNTER — NON-PROVIDER VISIT (OUTPATIENT)
Dept: MEDICAL GROUP | Facility: MEDICAL CENTER | Age: 22
End: 2017-11-21
Payer: COMMERCIAL

## 2017-11-21 DIAGNOSIS — Z23 NEED FOR VACCINATION: ICD-10-CM

## 2017-11-21 DIAGNOSIS — Z11.1 TUBERCULOSIS SCREENING: ICD-10-CM

## 2017-11-21 DIAGNOSIS — Z23 NEED FOR VARICELLA VACCINE: ICD-10-CM

## 2017-11-21 DIAGNOSIS — Z78.9 UNKNOWN VARICELLA VACCINATION STATUS: ICD-10-CM

## 2017-11-21 PROCEDURE — 90471 IMMUNIZATION ADMIN: CPT | Performed by: FAMILY MEDICINE

## 2017-11-21 PROCEDURE — 90746 HEPB VACCINE 3 DOSE ADULT IM: CPT | Performed by: FAMILY MEDICINE

## 2017-11-21 NOTE — NON-PROVIDER
"Sandy Yang is a 22 y.o. female here for a non-provider visit for:   HEPATITIS B 3 of 3    Reason for immunization: needed for work/school  Immunization records indicate need for vaccine: Yes, confirmed with ASPEN Matthew  Minimum interval has been met for this vaccine: Yes  ABN completed: Yes    Order and dose verified by: thierry RIVERA Dated  [id number] was given to patient: Yes  All IAC Questionnaire questions were answered \"No.\"    Patient tolerated injection and no adverse effects were observed or reported: Yes    Pt scheduled for next dose in series: Not Indicated    "

## 2017-11-21 NOTE — LETTER
November 21, 2017        Sandy Yang  [address] [facility] [address]        To Whom It May Concern:    Ms. Yang (1995) is a patient at this clinic. She is currently breast-feeding. It is my recommendation that she postpone the MMR vaccine until she has completed breast-feeding in 9 months.    If you have any questions or concerns, please don't hesitate to call.        Sincerely,        Ashleigh Waters MD    Electronically Signed

## 2017-11-29 ENCOUNTER — HOSPITAL ENCOUNTER (OUTPATIENT)
Dept: LAB | Facility: MEDICAL CENTER | Age: 22
End: 2017-11-29
Attending: FAMILY MEDICINE
Payer: COMMERCIAL

## 2017-11-29 DIAGNOSIS — Z23 NEED FOR VARICELLA VACCINE: ICD-10-CM

## 2017-11-29 DIAGNOSIS — Z78.9 UNKNOWN VARICELLA VACCINATION STATUS: ICD-10-CM

## 2017-11-29 DIAGNOSIS — Z11.1 TUBERCULOSIS SCREENING: ICD-10-CM

## 2017-11-29 LAB — VZV IGG SER IA-ACNC: POSITIVE

## 2017-11-29 PROCEDURE — 86480 TB TEST CELL IMMUN MEASURE: CPT

## 2017-11-29 PROCEDURE — 86787 VARICELLA-ZOSTER ANTIBODY: CPT

## 2017-11-29 PROCEDURE — 36415 COLL VENOUS BLD VENIPUNCTURE: CPT

## 2017-12-01 LAB
M TB TUBERC IFN-G BLD QL: NEGATIVE
M TB TUBERC IFN-G/MITOGEN IGNF BLD: -0
M TB TUBERC IGNF/MITOGEN IGNF CONTROL: 11.58 [IU]/ML
MITOGEN IGNF BCKGRD COR BLD-ACNC: 0.04 [IU]/ML

## 2017-12-12 DIAGNOSIS — C44.91 BASAL CELL CARCINOMA, UNSPECIFIED SITE: ICD-10-CM

## 2018-02-12 RX ORDER — FLUOXETINE HYDROCHLORIDE 40 MG/1
40 CAPSULE ORAL DAILY
Qty: 90 CAP | Refills: 0 | Status: SHIPPED | OUTPATIENT
Start: 2018-02-12 | End: 2018-05-29 | Stop reason: SDUPTHER

## 2018-02-12 NOTE — TELEPHONE ENCOUNTER
Was the patient seen in the last year in this department? Yes     Does patient have an active prescription for medications requested? Yes     Received Request Via: Patient

## 2018-05-29 RX ORDER — FLUOXETINE HYDROCHLORIDE 40 MG/1
40 CAPSULE ORAL DAILY
Qty: 90 CAP | Refills: 3 | Status: SHIPPED | OUTPATIENT
Start: 2018-05-29 | End: 2018-05-31

## 2018-05-30 DIAGNOSIS — F33.0 MAJOR DEPRESSIVE DISORDER, RECURRENT, MILD (HCC): ICD-10-CM

## 2018-05-30 NOTE — TELEPHONE ENCOUNTER
Pt would like a 90 day supply.      Was the patient seen in the last year in this department? Yes     Does patient have an active prescription for medications requested? No     Received Request Via: Pharmacy

## 2018-05-31 RX ORDER — FLUOXETINE HYDROCHLORIDE 20 MG/1
60 CAPSULE ORAL DAILY
Qty: 270 CAP | Refills: 1 | Status: SHIPPED | OUTPATIENT
Start: 2018-05-31 | End: 2019-01-08 | Stop reason: SDUPTHER

## 2018-06-21 DIAGNOSIS — F33.0 MAJOR DEPRESSIVE DISORDER, RECURRENT, MILD (HCC): ICD-10-CM

## 2018-06-21 RX ORDER — FLUOXETINE HYDROCHLORIDE 20 MG/1
60 CAPSULE ORAL DAILY
Qty: 90 CAP | Refills: 1 | Status: SHIPPED | OUTPATIENT
Start: 2018-06-21 | End: 2019-10-29

## 2018-07-03 ENCOUNTER — HOSPITAL ENCOUNTER (EMERGENCY)
Facility: MEDICAL CENTER | Age: 23
End: 2018-07-03
Attending: EMERGENCY MEDICINE
Payer: COMMERCIAL

## 2018-07-03 VITALS
TEMPERATURE: 98.2 F | OXYGEN SATURATION: 96 % | BODY MASS INDEX: 25.71 KG/M2 | HEART RATE: 92 BPM | SYSTOLIC BLOOD PRESSURE: 107 MMHG | DIASTOLIC BLOOD PRESSURE: 69 MMHG | HEIGHT: 65 IN | RESPIRATION RATE: 14 BRPM | WEIGHT: 154.32 LBS

## 2018-07-03 DIAGNOSIS — R21 RASH: ICD-10-CM

## 2018-07-03 LAB
ALBUMIN SERPL BCP-MCNC: 4.2 G/DL (ref 3.2–4.9)
ALBUMIN/GLOB SERPL: 1.3 G/DL
ALP SERPL-CCNC: 75 U/L (ref 30–99)
ALT SERPL-CCNC: 15 U/L (ref 2–50)
ANION GAP SERPL CALC-SCNC: 7 MMOL/L (ref 0–11.9)
AST SERPL-CCNC: 19 U/L (ref 12–45)
BASOPHILS # BLD AUTO: 0.2 % (ref 0–1.8)
BASOPHILS # BLD: 0.01 K/UL (ref 0–0.12)
BILIRUB SERPL-MCNC: 0.3 MG/DL (ref 0.1–1.5)
BUN SERPL-MCNC: 11 MG/DL (ref 8–22)
CALCIUM SERPL-MCNC: 9 MG/DL (ref 8.5–10.5)
CHLORIDE SERPL-SCNC: 107 MMOL/L (ref 96–112)
CO2 SERPL-SCNC: 24 MMOL/L (ref 20–33)
CREAT SERPL-MCNC: 0.66 MG/DL (ref 0.5–1.4)
EOSINOPHIL # BLD AUTO: 0.07 K/UL (ref 0–0.51)
EOSINOPHIL NFR BLD: 1.3 % (ref 0–6.9)
ERYTHROCYTE [DISTWIDTH] IN BLOOD BY AUTOMATED COUNT: 44.5 FL (ref 35.9–50)
GLOBULIN SER CALC-MCNC: 3.3 G/DL (ref 1.9–3.5)
GLUCOSE SERPL-MCNC: 96 MG/DL (ref 65–99)
HCT VFR BLD AUTO: 39.5 % (ref 37–47)
HGB BLD-MCNC: 13.3 G/DL (ref 12–16)
IMM GRANULOCYTES # BLD AUTO: 0.02 K/UL (ref 0–0.11)
IMM GRANULOCYTES NFR BLD AUTO: 0.4 % (ref 0–0.9)
LYMPHOCYTES # BLD AUTO: 1.88 K/UL (ref 1–4.8)
LYMPHOCYTES NFR BLD: 35.1 % (ref 22–41)
MCH RBC QN AUTO: 33.3 PG (ref 27–33)
MCHC RBC AUTO-ENTMCNC: 33.7 G/DL (ref 33.6–35)
MCV RBC AUTO: 99 FL (ref 81.4–97.8)
MONOCYTES # BLD AUTO: 0.39 K/UL (ref 0–0.85)
MONOCYTES NFR BLD AUTO: 7.3 % (ref 0–13.4)
NEUTROPHILS # BLD AUTO: 2.98 K/UL (ref 2–7.15)
NEUTROPHILS NFR BLD: 55.7 % (ref 44–72)
NRBC # BLD AUTO: 0 K/UL
NRBC BLD-RTO: 0 /100 WBC
PLATELET # BLD AUTO: 190 K/UL (ref 164–446)
PMV BLD AUTO: 10.6 FL (ref 9–12.9)
POTASSIUM SERPL-SCNC: 3.8 MMOL/L (ref 3.6–5.5)
PROT SERPL-MCNC: 7.5 G/DL (ref 6–8.2)
RBC # BLD AUTO: 3.99 M/UL (ref 4.2–5.4)
SODIUM SERPL-SCNC: 138 MMOL/L (ref 135–145)
TREPONEMA PALLIDUM IGG+IGM AB [PRESENCE] IN SERUM OR PLASMA BY IMMUNOASSAY: NON REACTIVE
WBC # BLD AUTO: 5.4 K/UL (ref 4.8–10.8)

## 2018-07-03 PROCEDURE — 80053 COMPREHEN METABOLIC PANEL: CPT

## 2018-07-03 PROCEDURE — 86780 TREPONEMA PALLIDUM: CPT

## 2018-07-03 PROCEDURE — 85025 COMPLETE CBC W/AUTO DIFF WBC: CPT

## 2018-07-03 PROCEDURE — 86757 RICKETTSIA ANTIBODY: CPT | Mod: 91

## 2018-07-03 PROCEDURE — 86618 LYME DISEASE ANTIBODY: CPT

## 2018-07-03 PROCEDURE — 87040 BLOOD CULTURE FOR BACTERIA: CPT | Mod: 91

## 2018-07-03 PROCEDURE — 99283 EMERGENCY DEPT VISIT LOW MDM: CPT

## 2018-07-03 PROCEDURE — 87476 LYME DIS DNA AMP PROBE: CPT

## 2018-07-03 RX ORDER — DOXYCYCLINE 100 MG/1
100 CAPSULE ORAL 2 TIMES DAILY
Qty: 20 CAP | Refills: 0 | Status: SHIPPED | OUTPATIENT
Start: 2018-07-03 | End: 2018-07-04

## 2018-07-03 NOTE — ED TRIAGE NOTES
"Chief Complaint   Patient presents with   • Body Aches     x 2 days   • Chills     Ambulatory to triage, reports body aches, chills, sweats, hives that on arm that went away, now with raised painful bumps on hands. Recently in [place], reports bug bites.    /69   Pulse 92   Temp 36.8 °C (98.2 °F)   Resp 14   Ht 1.651 m (5' 5\")   Wt 70 kg (154 lb 5.2 oz)   LMP 07/18/2017   SpO2 96%   BMI 25.68 kg/m²     Pt Informed regarding triage process and verbalized understanding to inform triage tech or RN for any changes in condition.  Placed in lobby.    "

## 2018-07-03 NOTE — ED NOTES
[place] 1 Fall Risk Assessment Tool    Present to ED because of fall  NO  (Syncope, seizure, or ALOC)  Age>70   NO  Altered Mental Status:  (Intoxicated with Alcohol or Substance Confusion,  Inability to follow instructions, disorientation)   NO  Impaired Mobility:  Ambulates or transfers with assistive devices or assist  Ambulates with unsteady gait and no assistance  Unable to ambulate or transfer   NO  Nursing Judgement  (Bowel or bladder incontinence, diarrhea, urinary   frequency or urgency, leg weakness, orthostatic   hypotension, dizziness or vertigo, narcotic use).   NO

## 2018-07-03 NOTE — ED PROVIDER NOTES
"ED Provider Note    Scribed for Annabelle Garcia M.D. by Marcy Irwin. 7/3/2018, 10:20 AM.    Primary care provider: Ashleigh Waters M.D.  Means of arrival: Walk-in  History obtained from: Patient  History limited by: None    CHIEF COMPLAINT  Chief Complaint   Patient presents with   • Body Aches     x 2 days   • Chills       HPI  Sandy Yang is a 22 y.o. female who presents to the Emergency Department for evaluation of joint pain, fevers, chills, and rash, onset 2 days ago. The rash is itchy and located on her bilateral forearms and hips. Patient also has a painful rash on her palms which \"feels like cactus pricks\".  She describes pain to the skin of her legs when rubbing against her bed sheets. Patient also has pain to her toe soles with walking but cannot see a rash. Patient denies any joint swelling or exposure to new detergents or lotions. She and her boyfriend recently returned to [place] from the [facility], [place], and [place]. Her daughter was bitten by a tick in [place]. Patient vomited 2 days ago and had diarrhea yesterday with associated abdominal pain. No bloody stools. She has had swollen tonsils which have improved here. She denies any chance of pregnancy. Patient is currently on Fluoxetine.     REVIEW OF SYSTEMS  Pertinent positives include joint pain, fevers, chills, rash, vomiting with associated abdominal pain (2 days ago), diarrhea (yesterday), swollen tonsils. Pertinent negatives include no bloody stools. See HPI for further details. All other systems reviewed and negative. C.     PAST MEDICAL HISTORY   has a past medical history of Atypical chest pain (11/21/2014); Chronic tonsillitis (7/8/2015); Family planning (11/16/2013); Fetal demise before 22 weeks with retention of dead fetus (8/5/2016); Gastric ulcer; Indigestion; Panic anxiety syndrome (7/8/2013); Pregnant; PUD (peptic ulcer disease) (12/23/09); and Stillbirth.    SURGICAL HISTORY   has a past " "surgical history that includes gastroscopy (12/23/2009); primary c section (8/9/12); breast biopsy (2/19/2014); and repeat c section (7/18/2017).    SOCIAL HISTORY  Social History   Substance Use Topics   • Smoking status: Never Smoker   • Smokeless tobacco: Never Used   • Alcohol use No      Comment: occasional; not while pregnant      History   Drug Use No       FAMILY HISTORY  Family History   Problem Relation Age of Onset   • Cancer Paternal Grandmother      lung cancer   • Heart Disease Maternal Uncle      MI age 45   • Cancer Father      HPV       CURRENT MEDICATIONS  Home Medications     Reviewed by Luc Contreras R.N. (Registered Nurse) on 07/03/18 at 0965  Med List Status: Partial   Medication Last Dose Status   busPIRone (BUSPAR) 5 MG Tab  Active   FLUoxetine (PROZAC) 20 MG Cap  Active   FLUoxetine (PROZAC) 20 MG Cap  Active   ibuprofen (MOTRIN) 600 MG Tab  Active                ALLERGIES  Allergies   Allergen Reactions   • Lidocaine    • Morphine Rash     Generalized itching and redness.    • Novocain      Used during dental surgery; had no affect toward deadening her pain   • Rocephin [Ceftriaxone] Rash     Itching     • Zoloft Shortness of Breath     Panic        PHYSICAL EXAM  VITAL SIGNS: /69   Pulse 92   Temp 36.8 °C (98.2 °F)   Resp 14   Ht 1.651 m (5' 5\")   Wt 70 kg (154 lb 5.2 oz)   LMP 07/18/2017   SpO2 96%   BMI 25.68 kg/m²     General: WDWN, nontoxic appearing in NAD; A+Ox3; V/S as above, afebrile   Skin: Palmar rash with discrete erythematous palpable papules. No solar rash. Scattered raised urticarial-appearing rash on the bilateral hip areas.  HEENT: NCAT; EOMs intact; PERRL; no scleral icterus   Neck: FROM; no meningismus, no LAD   Cardiovascular: Regular heart rate and rhythm. No murmurs, rubs, or gallops  Lungs: Clear to auscultation with good air movement bilaterally. No wheezes, rhonchi, or rales.   Abdomen: BS present; soft; NTND; no rebound, guarding, or rigidity. No " organomegaly or pulsatile mass; no CVAT   Extremities: BENTON x 4; no e/o trauma; no pedal edema. No joint swelling.  Neurologic: CNs III-XII grossly intact; speech clear; distal sensation intact; strength 5/5 UE/LEs; gait steady  Psychiatric: Appropriate affect, normal mood                         DIAGNOSTIC STUDIES / PROCEDURES    LABS  Results for orders placed or performed during the hospital encounter of 07/03/18   CBC WITH DIFFERENTIAL   Result Value Ref Range    WBC 5.4 4.8 - 10.8 K/uL    RBC 3.99 (L) 4.20 - 5.40 M/uL    Hemoglobin 13.3 12.0 - 16.0 g/dL    Hematocrit 39.5 37.0 - 47.0 %    MCV 99.0 (H) 81.4 - 97.8 fL    MCH 33.3 (H) 27.0 - 33.0 pg    MCHC 33.7 33.6 - 35.0 g/dL    RDW 44.5 35.9 - 50.0 fL    Platelet Count 190 164 - 446 K/uL    MPV 10.6 9.0 - 12.9 fL    Neutrophils-Polys 55.70 44.00 - 72.00 %    Lymphocytes 35.10 22.00 - 41.00 %    Monocytes 7.30 0.00 - 13.40 %    Eosinophils 1.30 0.00 - 6.90 %    Basophils 0.20 0.00 - 1.80 %    Immature Granulocytes 0.40 0.00 - 0.90 %    Nucleated RBC 0.00 /100 WBC    Neutrophils (Absolute) 2.98 2.00 - 7.15 K/uL    Lymphs (Absolute) 1.88 1.00 - 4.80 K/uL    Monos (Absolute) 0.39 0.00 - 0.85 K/uL    Eos (Absolute) 0.07 0.00 - 0.51 K/uL    Baso (Absolute) 0.01 0.00 - 0.12 K/uL    Immature Granulocytes (abs) 0.02 0.00 - 0.11 K/uL    NRBC (Absolute) 0.00 K/uL   LYME PCR,SERUM/PLASMA   Result Value Ref Range    Lyme Source Serum    CMP   Result Value Ref Range    Sodium 138 135 - 145 mmol/L    Potassium 3.8 3.6 - 5.5 mmol/L    Chloride 107 96 - 112 mmol/L    Co2 24 20 - 33 mmol/L    Anion Gap 7.0 0.0 - 11.9    Glucose 96 65 - 99 mg/dL    Bun 11 8 - 22 mg/dL    Creatinine 0.66 0.50 - 1.40 mg/dL    Calcium 9.0 8.5 - 10.5 mg/dL    AST(SGOT) 19 12 - 45 U/L    ALT(SGPT) 15 2 - 50 U/L    Alkaline Phosphatase 75 30 - 99 U/L    Total Bilirubin 0.3 0.1 - 1.5 mg/dL    Albumin 4.2 3.2 - 4.9 g/dL    Total Protein 7.5 6.0 - 8.2 g/dL    Globulin 3.3 1.9 - 3.5 g/dL    A-G Ratio 1.3  g/dL   T.PALLIDUM AB EIA   Result Value Ref Range    Syphilis, Treponemal Qual Non Reactive Non Reactive   ESTIMATED GFR   Result Value Ref Range    GFR If African American >60 >60 mL/min/1.73 m 2    GFR If Non African American >60 >60 mL/min/1.73 m 2   All labs reviewed by me.      COURSE & MEDICAL DECISION MAKING  Pertinent Labs & Imaging studies reviewed. (See chart for details)    Sandy Yang is a 22 y.o. female who presents complaining of painful rash on the palms and pruritic rash on her hips.    Differential Diagnoses include but are not limited to francisco j mountain spotted fever, lyme disease, syphilis.    ID was consulted by the patient's mother who works here at [facility].    Dr. Siddiqui here in ER to see pt    10:20 AM - Patient seen and examined at bedside. Ordered T. Pallidum AB EIA, Estimated GFR, Blood Culture x2, CBC, Lyme  PCR, Lyme Total, CMP to evaluate her symptoms.    White blood cell count is normal as is her platelet count.    12:20 PM - Will refer patient to Infectious Disease. The patient will be discharged with Monodox and should return if symptoms worsen or if new symptoms arise. Recommended cool compress and Zyrtec for palmar rash pain. The patient understands and agrees to plan.       DISPOSITION:  Patient will be discharged home in stable condition.    FOLLOW UP:  [facility], Emergency Dept  [address]  [facility] [address] [phone]    As needed, If symptoms worsen    Ashleigh Waters M.D.  [address] [facility]  [address]  [facility] [address] [phone]    Call in 1 day      Shana Siddiqui M.D.  [address] [facility]  [address]  [facility] [address] [phone]    Schedule an appointment as soon as possible for a visit in 1 day        OUTPATIENT MEDICATIONS:  Discharge Medication List as of 7/3/2018 11:14 AM      START taking these medications    Details   doxycycline (MONODOX) 100 MG capsule Take 1 Cap by mouth 2 times a day for 10 days., Disp-20 Cap,  R-0, Normal             FINAL IMPRESSION  1. Rash          Marcy BRAND (Scribe), am scribing for, and in the presence of, Annabelle Garcia M.D..    Electronically signed by: Marcy Irwin ([place]), 7/3/2018    Annabelle BRAND M.D. personally performed the services described in this documentation, as scribed by Marcy Irwin in my presence, and it is both accurate and complete.    The note accurately reflects work and decisions made by me.  Annabelle Garcia  7/5/2018  12:36 AM

## 2018-07-03 NOTE — ED NOTES
Pt being discharged home to self care, discussed discharge instruction. Pt verbalizes understanding. vss. Ambulated to waiting area.

## 2018-07-03 NOTE — DISCHARGE INSTRUCTIONS
Take doxycycline 1 tab twice daily for 10 days  Follow up with infectious disease and your primary care provider  Return to the ER for vomiting, headache, worsening rash, or other concerns

## 2018-07-03 NOTE — ED NOTES
Discussed recent travel w/ patient    Recently she was in the [place] for a week; pt states she returned 2 weeks ago. While in [place]; pt states she was subjected to multiple bug bites and swam in warm    Pt c/o fever yesterday, none reported today. No body aches reported today

## 2018-07-03 NOTE — ED NOTES
Pt denies any recent falls/accidents/trauma.    Pt c/o hives which began yesterday and subsided.    Red spots noted to bilateral hands and feet, not raised, palpable.

## 2018-07-04 ENCOUNTER — HOSPITAL ENCOUNTER (EMERGENCY)
Facility: MEDICAL CENTER | Age: 23
End: 2018-07-04
Attending: EMERGENCY MEDICINE
Payer: COMMERCIAL

## 2018-07-04 VITALS
DIASTOLIC BLOOD PRESSURE: 78 MMHG | OXYGEN SATURATION: 96 % | TEMPERATURE: 98.4 F | HEART RATE: 58 BPM | SYSTOLIC BLOOD PRESSURE: 111 MMHG | BODY MASS INDEX: 24.96 KG/M2 | RESPIRATION RATE: 14 BRPM | WEIGHT: 150 LBS

## 2018-07-04 DIAGNOSIS — B08.4 HAND, FOOT AND MOUTH DISEASE: ICD-10-CM

## 2018-07-04 PROCEDURE — 99283 EMERGENCY DEPT VISIT LOW MDM: CPT | Mod: EDC

## 2018-07-04 RX ORDER — AMOXICILLIN 500 MG/1
500 CAPSULE ORAL 3 TIMES DAILY
Qty: 42 CAP | Refills: 0 | Status: SHIPPED | OUTPATIENT
Start: 2018-07-04 | End: 2018-07-18

## 2018-07-04 RX ORDER — FLUCONAZOLE 150 MG/1
TABLET ORAL
Qty: 2 TAB | Refills: 1 | Status: SHIPPED | OUTPATIENT
Start: 2018-07-04 | End: 2019-10-29

## 2018-07-04 ASSESSMENT — LIFESTYLE VARIABLES: DO YOU DRINK ALCOHOL: NO

## 2018-07-04 NOTE — ED NOTES
Sandy Yang D/C'd.  Discharge instructions including s/s to return to ED, follow up appointments, hydration importance and hand foot mouth/atypical hand foot mouth provided to pt.    Patient verbalized understanding with no further questions and concerns.    Copy of discharge provided to pt.  Signed copy in chart.    Prescription for amoxicillin and diflucan provided to pt.   Pt ambulated out of department independently; pt in NAD, awake, alert, interactive and age appropriate.

## 2018-07-04 NOTE — ED PROVIDER NOTES
ED Provider Note    Scribed for Joann Fleming M.D. by Nick Adkins. 7/4/2018  11:11 AM    Primary care provider: Ashleigh Waters M.D.  Means of arrival: walk in   History obtained from: patient   History limited by: none     CHIEF COMPLAINT  Chief Complaint   Patient presents with   • Rash     bilateral hands (palms) and feet (soles), x2 days       HPI  Sandy Yang is a 22 y.o. female who presents to the Emergency Department with a rash to her bilateral palms and soles of her feet onset two days ago. She reports that she was seen here yesterday for the same and is taking Doxycycline as she was also bitten by a take and has labs pending. She reports that the rash in painful and reports a previous body ache and fever which have both resolved and now is just having the pain. Patient reports that she has recently been traveling to the [place] as well as [place]. She denies any nausea, vomiting, weakness, dizziness, shortness of breath, chest pain at this time.     REVIEW OF SYSTEMS  HEENT:  No ear pain, congestion, or sore throat   EYES: no discharge, redness, or vision changes  CARDIAC: no chest pain, no palpitations    PULMONARY: no dyspnea, cough, or congestion   GI: no vomiting, diarrhea, or abdominal pain   : no dysuria, back pain, or hematuria   Neuro: no weakness, numbness, aphasia, or headache  Musculoskeletal: no swelling, deformity, pain, or joint swelling  Endocrine: no sweating, or weight loss. Reports resolved fever.   SKIN: rash with pain to the palms and sole of the feet.     See history of present illness. E.    PAST MEDICAL HISTORY   has a past medical history of Atypical chest pain (11/21/2014); Chronic tonsillitis (7/8/2015); Family planning (11/16/2013); Fetal demise before 22 weeks with retention of dead fetus (8/5/2016); Gastric ulcer; Indigestion; Panic anxiety syndrome (7/8/2013); Pregnant; PUD (peptic ulcer disease) (12/23/09); and Stillbirth.    SURGICAL  HISTORY   has a past surgical history that includes gastroscopy (12/23/2009); primary c section (8/9/12); breast biopsy (2/19/2014); and repeat c section (7/18/2017).    SOCIAL HISTORY  Social History   Substance Use Topics   • Smoking status: Former Smoker     Types: Cigarettes     Quit date: 6/27/2018   • Smokeless tobacco: Never Used   • Alcohol use No      Comment: occasional; not while pregnant      History   Drug Use No       FAMILY HISTORY  Family History   Problem Relation Age of Onset   • Cancer Paternal Grandmother      lung cancer   • Heart Disease Maternal Uncle      MI age 45   • Cancer Father      HPV       CURRENT MEDICATIONS  Home Medications     Reviewed by Sunitha Jarquin R.N. (Registered Nurse) on 07/04/18 at 1101  Med List Status: Partial   Medication Last Dose Status   busPIRone (BUSPAR) 5 MG Tab  Active   FLUoxetine (PROZAC) 20 MG Cap 7/3/2018 Active   FLUoxetine (PROZAC) 20 MG Cap  Active   ibuprofen (MOTRIN) 600 MG Tab 7/3/2018 Active   Non Formulary Request 7/4/2018 Active                ALLERGIES  Allergies   Allergen Reactions   • Lidocaine    • Morphine Rash     Generalized itching and redness.    • Novocain      Used during dental surgery; had no affect toward deadening her pain   • Rocephin [Ceftriaxone] Rash     Itching     • Zoloft Shortness of Breath     Panic        PHYSICAL EXAM  VITAL SIGNS: /78   Pulse 82   Temp 36.7 °C (98.1 °F)   Resp 14   Wt 68 kg (150 lb)   LMP 07/18/2017   SpO2 96%   Breastfeeding? Yes   BMI 24.96 kg/m²   Constitutional: Well developed, Well nourished, No acute distress, Non-toxic appearance.   HEENT: Normocephalic, Atraumatic, No neck stiffness, external ears normal, pharynx pink,  Mucous  Membranes moist, No rhinorrhea or mucosal edema  Eyes: PERRL, EOMI, Conjunctiva normal, No discharge.   Neck: Normal range of motion, No tenderness, Supple, No stridor.   Lymphatic: No lymphadenopathy    Cardiovascular: Regular Rate and Rhythm, No murmurs,   rubs, or gallops.   Thorax & Lungs: Lungs clear to auscultation bilaterally, No respiratory distress, No wheezes, rhales or rhonchi, No chest wall tenderness.   Skin: Warm, Dry, maculopapular rash on palms of hands, and soles feet  Extremities: Equal, intact distal pulses, No cyanosis, No tenderness.   Musculoskeletal: Good range of motion in all major joints. No tenderness to palpation or major deformities noted.   Neurologic: Alert & awake, no focal deficits  Psychiatric: Affect normal      COURSE & MEDICAL DECISION MAKING  Nursing notes, VS, PMSFHx reviewed in chart.     11:11 AM - Patient seen and examined at bedside. I spoke to the patient about how this appears viral and that it would just need to run its course but encouraged fluids, rest, and staying out of the sun for the day. I told the patient that I would consult with pharmacy about breast feeding on Doxycycline. She was agreeable to the plan of care at this time.     11:45 AM I spoke to pharmacy about the patients antibiotics and they advised changing to amoxicillin as it is safer for mom to breast feed on. She was made aware of her plan of care and was receptive and agreeable with discharge home at this time.     The patient will return for new or worsening symptoms and is stable at the time of discharge.    DISPOSITION:  Patient will be discharged home in stable condition.    FOLLOW UP:  Ashleigh Waters M.D.  [address] [place] [address] [address] [place] [id number] [phone]    Call in 2 days  As needed, If symptoms worsen    [facility], Emergency Dept  [address] [facility] [id number] [phone]    As needed, If symptoms worsen      OUTPATIENT MEDICATIONS:  Discharge Medication List as of 7/4/2018 11:34 AM      START taking these medications    Details   amoxicillin (AMOXIL) 500 MG Cap Take 1 Cap by mouth 3 times a day for 14 days., Disp-42 Cap, R-0, Normal      fluconazole (DIFLUCAN) 150 MG tablet Take one [facility] through  antibiotic treatment and one at the end, Disp-2 Tab, R-1, Normal               FINAL IMPRESSION  1. Hand, foot and mouth disease          INick (Scribe), am scribing for, and in the presence of, Joann Fleming M.D..    Electronically signed by: Nick Adkins (Scribe), 7/4/2018    Joann BRAND M.D. personally performed the services described in this documentation, as scribed by Nick Adkins in my presence, and it is both accurate and complete.    The note accurately reflects work and decisions made by me.  Joann Fleming  7/4/2018  3:03 PM

## 2018-07-05 LAB
B BURGDOR AB SER IA-ACNC: 0.29 LIV (ref 0–1.2)
TEST NAME 95000: NORMAL

## 2018-07-06 LAB
B BURGDOR DNA SPEC QL NAA+PROBE: NOT DETECTED
LYME SOURCE Q4169: NORMAL

## 2018-07-08 LAB
BACTERIA BLD CULT: NORMAL
BACTERIA BLD CULT: NORMAL
SIGNIFICANT IND 70042: NORMAL
SIGNIFICANT IND 70042: NORMAL
SITE SITE: NORMAL
SITE SITE: NORMAL
SOURCE SOURCE: NORMAL
SOURCE SOURCE: NORMAL

## 2018-07-17 ENCOUNTER — PATIENT OUTREACH (OUTPATIENT)
Dept: HEALTH INFORMATION MANAGEMENT | Facility: OTHER | Age: 23
End: 2018-07-17

## 2018-07-17 NOTE — PROGRESS NOTES
Outcome: Left Message    Please transfer to Patient Outreach Team at [phone] when patient returns call.    WebIZ Checked & Epic Updated:  yes    Attempt # 3    Prior attempts made by MacuCLEAR

## 2018-07-18 NOTE — PROGRESS NOTES
Outcome: Left Message    Please transfer to Patient Outreach Team at [phone] when patient returns call.    Attempt # 4th & Final

## 2019-01-08 DIAGNOSIS — F33.0 MAJOR DEPRESSIVE DISORDER, RECURRENT, MILD (HCC): ICD-10-CM

## 2019-01-08 RX ORDER — FLUOXETINE HYDROCHLORIDE 20 MG/1
60 CAPSULE ORAL DAILY
Qty: 270 CAP | Refills: 1 | Status: SHIPPED | OUTPATIENT
Start: 2019-01-08 | End: 2019-10-29 | Stop reason: SDUPTHER

## 2019-03-22 NOTE — PROGRESS NOTES
Attempt #:2    Verify PCP: yes    Communication Preference Obtained: yes     Review Care Team: yes         Care Coordination Enrollment (Attempt to Enroll in Care Coordination)  2. Is patient appropriate: YES  3. Is patient interested: NO - not interested     Care Gap Scheduling (Attempt to Schedule EACH Overdue Care Gap!)     Health Maintenance Due   Topic Date Due   • IMM HEP A VACCINE (1 of 2 - Standard Series) DECLINED   • IMM HEP B VACCINE (3 of 3 - Primary Series) DECLINED   • IMM VARICELLA (CHICKENPOX) VACCINE (1 of 2 - 2 Dose Adolescent Series) DECLINED         WillCall Activation: already active  WillCall Ivory: no  Virtual Visits: no  Opt In to Text Messages: no   I have reviewed and confirmed nurses' notes for patient's medications, allergies, medical history, and surgical history.

## 2019-05-19 NOTE — CARE PLAN
Problem: Potential for postpartum infection related to surgical incision, compromised uterine condition, urinary tract or respiratory compromise  Goal: Patient will be afebrile and free from signs and symptoms of infection  Outcome: PROGRESSING AS EXPECTED  No signs or symptoms of infection noted    Problem: Alteration in comfort related to surgical incision and/or after birth pains  Goal: Patient is able to ambulate, care for self and infant with acceptable pain level  Outcome: PROGRESSING SLOWER THAN EXPECTED  Taking pain meds states pain meds last for 2 hrs then pain   Starts again. Does not want to take dilaudid again. States having gas pains. Encouraged to ambulate in halls. Mylicon and mint tea given         Oriented - self; Oriented - place; Oriented - time

## 2019-10-22 ENCOUNTER — TELEPHONE (OUTPATIENT)
Dept: RADIOLOGY | Facility: MEDICAL CENTER | Age: 24
End: 2019-10-22

## 2019-10-22 NOTE — TELEPHONE ENCOUNTER
called pt to verify she is not having any breast problems or need followup exam; pt states she needs screening mammogram; i advise i will obtain a signature from her provider as she is under 40/tml

## 2019-10-23 ENCOUNTER — HOSPITAL ENCOUNTER (OUTPATIENT)
Dept: RADIOLOGY | Facility: MEDICAL CENTER | Age: 24
End: 2019-10-23
Attending: FAMILY MEDICINE
Payer: COMMERCIAL

## 2019-10-23 DIAGNOSIS — Z12.31 VISIT FOR SCREENING MAMMOGRAM: ICD-10-CM

## 2019-10-29 ENCOUNTER — OFFICE VISIT (OUTPATIENT)
Dept: MEDICAL GROUP | Facility: MEDICAL CENTER | Age: 24
End: 2019-10-29
Payer: COMMERCIAL

## 2019-10-29 VITALS
HEIGHT: 66 IN | OXYGEN SATURATION: 95 % | WEIGHT: 175.04 LBS | BODY MASS INDEX: 28.13 KG/M2 | DIASTOLIC BLOOD PRESSURE: 78 MMHG | HEART RATE: 71 BPM | TEMPERATURE: 98.9 F | SYSTOLIC BLOOD PRESSURE: 118 MMHG

## 2019-10-29 DIAGNOSIS — Z11.3 ROUTINE SCREENING FOR STI (SEXUALLY TRANSMITTED INFECTION): ICD-10-CM

## 2019-10-29 DIAGNOSIS — F33.0 MAJOR DEPRESSIVE DISORDER, RECURRENT, MILD (HCC): ICD-10-CM

## 2019-10-29 DIAGNOSIS — R30.0 DYSURIA: ICD-10-CM

## 2019-10-29 DIAGNOSIS — Z12.4 SCREENING FOR CERVICAL CANCER: ICD-10-CM

## 2019-10-29 DIAGNOSIS — Z11.4 ENCOUNTER FOR SCREENING FOR HIV: ICD-10-CM

## 2019-10-29 DIAGNOSIS — N39.46 MIXED STRESS AND URGE URINARY INCONTINENCE: ICD-10-CM

## 2019-10-29 PROCEDURE — 99213 OFFICE O/P EST LOW 20 MIN: CPT | Performed by: FAMILY MEDICINE

## 2019-10-29 RX ORDER — FLUOXETINE HYDROCHLORIDE 20 MG/1
60 CAPSULE ORAL DAILY
Qty: 270 CAP | Refills: 3 | Status: SHIPPED | OUTPATIENT
Start: 2019-10-29 | End: 2020-10-03

## 2019-10-29 RX ORDER — BUSPIRONE HYDROCHLORIDE 5 MG/1
5 TABLET ORAL 3 TIMES DAILY
Qty: 270 TAB | Refills: 3 | Status: SHIPPED | OUTPATIENT
Start: 2019-10-29 | End: 2020-10-03

## 2019-10-29 SDOH — HEALTH STABILITY: MENTAL HEALTH: HOW MANY STANDARD DRINKS CONTAINING ALCOHOL DO YOU HAVE ON A TYPICAL DAY?: 1 OR 2

## 2019-10-29 SDOH — HEALTH STABILITY: MENTAL HEALTH: HOW OFTEN DO YOU HAVE 6 OR MORE DRINKS ON ONE OCCASION?: NEVER

## 2019-10-29 SDOH — HEALTH STABILITY: MENTAL HEALTH: HOW OFTEN DO YOU HAVE A DRINK CONTAINING ALCOHOL?: 2-4 TIMES A MONTH

## 2019-10-29 ASSESSMENT — PATIENT HEALTH QUESTIONNAIRE - PHQ9
SUM OF ALL RESPONSES TO PHQ9 QUESTIONS 1 AND 2: 0
9. THOUGHTS THAT YOU WOULD BE BETTER OFF DEAD, OR OF HURTING YOURSELF: NOT AT ALL
6. FEELING BAD ABOUT YOURSELF - OR THAT YOU ARE A FAILURE OR HAVE LET YOURSELF OR YOUR FAMILY DOWN: NOT AL ALL
4. FEELING TIRED OR HAVING LITTLE ENERGY: MORE THAN HALF THE DAYS
7. TROUBLE CONCENTRATING ON THINGS, SUCH AS READING THE NEWSPAPER OR WATCHING TELEVISION: MORE THAN HALF THE DAYS
1. LITTLE INTEREST OR PLEASURE IN DOING THINGS: NOT AT ALL
5. POOR APPETITE OR OVEREATING: NOT AT ALL
3. TROUBLE FALLING OR STAYING ASLEEP OR SLEEPING TOO MUCH: NOT AT ALL
2. FEELING DOWN, DEPRESSED, IRRITABLE, OR HOPELESS: NOT AT ALL
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
## 2020-01-29 ENCOUNTER — NON-PROVIDER VISIT (OUTPATIENT)
Dept: OCCUPATIONAL MEDICINE | Facility: CLINIC | Age: 25
End: 2020-01-29
Payer: COMMERCIAL

## 2020-01-29 DIAGNOSIS — Z02.1 PRE-EMPLOYMENT DRUG SCREENING: ICD-10-CM

## 2020-01-29 LAB
AMP AMPHETAMINE: NORMAL
COC COCAINE: NORMAL
INT CON NEG: NORMAL
INT CON POS: NORMAL
MET METHAMPHETAMINES: NORMAL
OPI OPIATES: NORMAL
PCP PHENCYCLIDINE: NORMAL
POC DRUG COMMENT 753798-OCCUPATIONAL HEALTH: NORMAL
THC: NORMAL

## 2020-01-29 PROCEDURE — 80305 DRUG TEST PRSMV DIR OPT OBS: CPT | Performed by: NURSE PRACTITIONER

## 2020-01-29 PROCEDURE — 8911 PR MRO FEE: Performed by: NURSE PRACTITIONER

## 2020-01-29 NOTE — NON-PROVIDER
Shy bladder process started @ 90 am. 16 oz of water was given at 09:02 am. Shy bladder process was explained to the patient

## 2020-03-18 ENCOUNTER — TELEPHONE (OUTPATIENT)
Dept: HEALTH INFORMATION MANAGEMENT | Facility: OTHER | Age: 25
End: 2020-03-18

## 2020-03-18 NOTE — TELEPHONE ENCOUNTER
1. Caller Name: Sandy                       Call Back Number: [phone]   [facility] PCP or Specialty Provider: Yes Dr. Waters        2.  Does patient have any active symptoms of respiratory illness (fever OR cough OR shortness of breath)? Yes, the patient reports the following respiratory symptoms: cough and migraine, body aches, chills, stomach cramping, watery diarrhea, sneezing, sore throat, N/V    3.  Does patient have any comoribidities? None     4.  In the last 30 days, has the patient traveled outside of the country OR in a high risk area within the  OR have any known contact with someone who has or is suspected to have COVID-19?  No.    5. Disposition: Advised to perform self care, monitor for worsening symptoms and to call back in 3 days if no improvement. Given number for medicaid teladoc if needed    Note routed to PCP: [facility] only.

## 2020-06-15 ENCOUNTER — HOSPITAL ENCOUNTER (OUTPATIENT)
Dept: RADIOLOGY | Facility: MEDICAL CENTER | Age: 25
End: 2020-06-15
Attending: PHYSICIAN ASSISTANT

## 2020-06-15 ENCOUNTER — HOSPITAL ENCOUNTER (OUTPATIENT)
Facility: MEDICAL CENTER | Age: 25
End: 2020-06-15
Attending: PHYSICIAN ASSISTANT

## 2020-06-15 ENCOUNTER — OFFICE VISIT (OUTPATIENT)
Dept: URGENT CARE | Facility: PHYSICIAN GROUP | Age: 25
End: 2020-06-15

## 2020-06-15 VITALS
WEIGHT: 180 LBS | HEIGHT: 66 IN | DIASTOLIC BLOOD PRESSURE: 80 MMHG | RESPIRATION RATE: 18 BRPM | OXYGEN SATURATION: 97 % | HEART RATE: 94 BPM | SYSTOLIC BLOOD PRESSURE: 112 MMHG | TEMPERATURE: 98.9 F | BODY MASS INDEX: 28.93 KG/M2

## 2020-06-15 DIAGNOSIS — R10.2 PELVIC PAIN: ICD-10-CM

## 2020-06-15 DIAGNOSIS — R30.0 DYSURIA: ICD-10-CM

## 2020-06-15 DIAGNOSIS — N39.0 URINARY TRACT INFECTION WITH HEMATURIA, SITE UNSPECIFIED: Primary | ICD-10-CM

## 2020-06-15 DIAGNOSIS — R31.9 URINARY TRACT INFECTION WITH HEMATURIA, SITE UNSPECIFIED: Primary | ICD-10-CM

## 2020-06-15 LAB
APPEARANCE UR: NORMAL
BILIRUB UR STRIP-MCNC: NEGATIVE MG/DL
COLOR UR AUTO: YELLOW
GLUCOSE UR STRIP.AUTO-MCNC: NEGATIVE MG/DL
INT CON NEG: NORMAL
INT CON POS: NORMAL
KETONES UR STRIP.AUTO-MCNC: NEGATIVE MG/DL
LEUKOCYTE ESTERASE UR QL STRIP.AUTO: NORMAL
NITRITE UR QL STRIP.AUTO: POSITIVE
PH UR STRIP.AUTO: 7.5 [PH] (ref 5–8)
POC URINE PREGNANCY TEST: NEGATIVE
PROT UR QL STRIP: NEGATIVE MG/DL
RBC UR QL AUTO: NEGATIVE
SP GR UR STRIP.AUTO: 1.02
UROBILINOGEN UR STRIP-MCNC: 0.2 MG/DL

## 2020-06-15 PROCEDURE — 87660 TRICHOMONAS VAGIN DIR PROBE: CPT

## 2020-06-15 PROCEDURE — 87491 CHLMYD TRACH DNA AMP PROBE: CPT

## 2020-06-15 PROCEDURE — 76830 TRANSVAGINAL US NON-OB: CPT

## 2020-06-15 PROCEDURE — 81002 URINALYSIS NONAUTO W/O SCOPE: CPT | Performed by: PHYSICIAN ASSISTANT

## 2020-06-15 PROCEDURE — 87077 CULTURE AEROBIC IDENTIFY: CPT

## 2020-06-15 PROCEDURE — 81025 URINE PREGNANCY TEST: CPT | Performed by: PHYSICIAN ASSISTANT

## 2020-06-15 PROCEDURE — 87186 SC STD MICRODIL/AGAR DIL: CPT

## 2020-06-15 PROCEDURE — 87086 URINE CULTURE/COLONY COUNT: CPT

## 2020-06-15 PROCEDURE — 99214 OFFICE O/P EST MOD 30 MIN: CPT | Performed by: PHYSICIAN ASSISTANT

## 2020-06-15 PROCEDURE — 87591 N.GONORRHOEAE DNA AMP PROB: CPT

## 2020-06-15 PROCEDURE — 87480 CANDIDA DNA DIR PROBE: CPT

## 2020-06-15 PROCEDURE — 87510 GARDNER VAG DNA DIR PROBE: CPT

## 2020-06-15 RX ORDER — SULFAMETHOXAZOLE AND TRIMETHOPRIM 800; 160 MG/1; MG/1
1 TABLET ORAL 2 TIMES DAILY
Qty: 14 TAB | Refills: 0 | Status: SHIPPED | OUTPATIENT
Start: 2020-06-15 | End: 2020-06-22

## 2020-06-15 RX ORDER — PHENAZOPYRIDINE HYDROCHLORIDE 200 MG/1
200 TABLET, FILM COATED ORAL 3 TIMES DAILY PRN
Qty: 6 TAB | Refills: 0 | Status: SHIPPED | OUTPATIENT
Start: 2020-06-15 | End: 2020-10-03

## 2020-06-15 ASSESSMENT — FIBROSIS 4 INDEX: FIB4 SCORE: 0.67

## 2020-06-15 NOTE — LETTER
Yelena 15, 2020         Patient: Sandy Yang   YOB: 1995   Date of Visit: 6/15/2020           To Whom it May Concern:    Sandy Yang was seen in my clinic on 6/15/2020. She may return to work on 6/16/2020.    If you have any questions or concerns, please don't hesitate to call.        Sincerely,           Rachana Parada P.A.-C.  Electronically Signed

## 2020-06-15 NOTE — PROGRESS NOTES
Subjective:      Sandy Yang is a 24 y.o. female who presents with Pelvic Pain (chest pain, shortness of [id number] )    Medications:    • busPIRone  • FLUoxetine Caps  • ibuprofen Tabs    Allergies: Lidocaine; Morphine; Novocain; Rocephin [ceftriaxone]; and Zoloft    Problem List: Sandy Yang has Seasonal allergies; H/O gastric ulcer; S/P excision of fibroadenoma of breast; and Major depressive disorder, recurrent, mild (HCC) on their problem list.    Surgical History:  Past Surgical History:   Procedure Laterality Date   • REPEAT C SECTION  7/18/2017    Procedure: REPEAT C SECTION;  Surgeon: Olive Valle M.D.;  Location: LABOR AND DELIVERY;  Service:    • BREAST BIOPSY  2/19/2014    Performed by Reid Adorno M.D. at SURGERY SAME DAY [facility] ORS   • PRIMARY C SECTION  8/9/12    for breech   • GASTROSCOPY  12/23/2009    Performed by SUSANA MUÑOZ at SURGERY [facility] ORS       Past Social Hx: Sandy Yang  reports that she quit smoking about 2 years ago. Her smoking use included cigarettes. She has never used smokeless tobacco. She reports current alcohol use. She reports current drug use. Frequency: 1.00 time per week. Drug: Marijuana.     Past Family Hx:  Sandy Yang family history includes Cancer in her father and paternal grandmother; Heart Disease in her maternal uncle.     Problem list, medications, and allergies reviewed by myself today in Epic.         Patient presents with:  Pelvic Pain, cramping, sometimes intermittent sometimes persistent, lasting up to an hour.  Pain is generally crampy in nature.  Occasionally this sudden jolts of pain will cause shortness of breath ( intermittent when pain is sharp it makes her catch her breath- not persistently short of breath) and heartburn/nausea x 1 week.  Patient denies dysuria, vaginal bleeding, vaginal discharge, rash, lesions.  Patient is concerned that her IUD may be out of place and she  "cannot feel her strings.  Patient is sexually active, intercourse definitely makes pain worse.  Patient has not been seen by her OB or PCP regarding this complaint.  Patient denies any other problems today.        Pelvic Pain   This is a new problem. The current episode started in the past 7 days. The problem occurs intermittently. The problem has been waxing and waning. Associated symptoms include abdominal pain. Pertinent negatives include no anorexia, chills, fever, myalgias, nausea, rash, sore throat, urinary symptoms or vomiting. The symptoms are aggravated by intercourse and exertion. She has tried NSAIDs, relaxation and rest for the symptoms. The treatment provided mild relief.       Review of Systems   Constitutional: Negative for chills and fever.   HENT: Negative for sore throat.    Gastrointestinal: Positive for abdominal pain. Negative for anorexia, blood in stool, constipation, diarrhea, heartburn, nausea and vomiting.   Genitourinary: Positive for pelvic pain. Negative for dysuria, flank pain, frequency, hematuria and urgency.   Musculoskeletal: Negative for myalgias.   Skin: Negative for rash.   Neurological: Negative for dizziness.   All other systems reviewed and are negative.         Objective:     /80   Pulse 94   Temp 37.2 °C (98.9 °F) (Temporal)   Resp 18   Ht 1.664 m (5' 5.5\")   Wt 81.6 kg (180 lb)   SpO2 97%   BMI 29.50 kg/m²      Physical Exam  Vitals signs and nursing note reviewed. Chaperone present: Declined chaperone.   Constitutional:       General: She is not in acute distress.     Appearance: Normal appearance. She is well-developed and normal weight.   HENT:      Head: Normocephalic.      Mouth/Throat:      Mouth: Mucous membranes are moist.      Pharynx: Oropharynx is clear.   Eyes:      Conjunctiva/sclera: Conjunctivae normal.      Pupils: Pupils are equal, round, and reactive to light.   Neck:      Musculoskeletal: Normal range of motion and neck supple. "   Cardiovascular:      Rate and Rhythm: Normal rate.   Pulmonary:      Effort: Pulmonary effort is normal.      Breath sounds: Normal breath sounds.   Abdominal:      General: Abdomen is flat. Bowel sounds are normal.      Palpations: Abdomen is soft.      Tenderness: There is abdominal tenderness in the suprapubic area. There is no guarding or rebound. Negative signs include Day's sign, Rovsing's sign and McBurney's sign.      Hernia: There is no hernia in the left inguinal area or right inguinal area.       Genitourinary:     Exam position: Lithotomy position.      Labia:         Right: No rash.         Left: No rash.       Vagina: No foreign body. No erythema, bleeding or prolapsed vaginal walls.      Cervix: Erythema present. No discharge or lesion.      Uterus: Tender.       Adnexa: Right adnexa normal and left adnexa normal.   Musculoskeletal: Normal range of motion.   Lymphadenopathy:      Lower Body: No right inguinal adenopathy. No left inguinal adenopathy.   Skin:     General: Skin is warm and dry.      Capillary Refill: Capillary refill takes less than 2 seconds.   Neurological:      Mental Status: She is alert and oriented to person, place, and time.              RADIOLOGY RESULTS   Us-pelvic Complete (transabdominal/transvaginal) (combo)    Result Date: 6/15/2020  6/15/2020 5:46 PM HISTORY/REASON FOR EXAM:  Pelvic pain with vaginal fullness for the past 2 months. History of urinary tract infection. TECHNIQUE/EXAM DESCRIPTION: Transabdominal and transvaginal pelvic ultrasound. COMPARISON:   None FINDINGS: Both transabdominal and transvaginal scanning were performed to optimally visualize the pelvis. The uterus measures 3.92 cm x 7.82 cm x 4.29 cm. The uterine myometrium is within normal limits. The endometrial echo complex measures 0.50 cm. IUD centrally located in normal position within the uterus. Low resistive waveforms are confirmed within the ovaries. The right ovary measures 2.84 cm x 1.56 cm x  3.25 cm. The left ovary measures 2.43 cm x 2.11 cm x 2.29 cm. There is no adnexal mass. There is no free fluid seen.     1.  Unremarkable transvaginal appearance of the pelvis.       Results for MYCHAL YEBOAH (MRN [id number]) as of 6/17/2020 13:55   Ref. Range 6/15/2020 12:49   POC Color Latest Ref Range: Negative  yellow   POC Appearance Latest Ref Range: Negative  slightly cloudy   POC Specific Gravity Latest Ref Range: <1.005 - >1.030  1.020   POC Urine PH Latest Ref Range: 5.0 - 8.0  7.5   POC Glucose Latest Ref Range: Negative mg/dL negative   POC Ketones Latest Ref Range: Negative mg/dL negative   POC Protein Latest Ref Range: Negative mg/dL negative   POC Nitrites Latest Ref Range: Negative  positive   POC Leukocyte Esterase Latest Ref Range: Negative  trace   POC Blood Latest Ref Range: Negative  negative   POC Bilirubin Latest Ref Range: Negative mg/dL negative   POC Urobiligen Latest Ref Range: Negative (0.2) mg/dL 0.2     Preg:  Neg     Assessment/Plan:     1. Pelvic pain  POCT Urinalysis    POCT Pregnancy    Chlamydia/GC PCR Urine Or Swab    VAGINAL PATHOGENS DNA PANEL    URINE CULTURE(NEW)    US-PELVIC COMPLETE (TRANSABDOMINAL/TRANSVAGINAL) (COMBO)    sulfamethoxazole-trimethoprim (BACTRIM DS) 800-160 MG tablet    phenazopyridine (PYRIDIUM) 200 MG Tab   2. Dysuria  sulfamethoxazole-trimethoprim (BACTRIM DS) 800-160 MG tablet    phenazopyridine (PYRIDIUM) 200 MG Tab   3. Urinary tract infection with hematuria, site unspecified  sulfamethoxazole-trimethoprim (BACTRIM DS) 800-160 MG tablet    phenazopyridine (PYRIDIUM) 200 MG Tab     PT to begin medications today as prescribed.    No intercourse for one week.     Culture sent to lab, will call with any necessary treatment or treatment changes.     Discussed results of US with patient, she is aware of normal findings.     PT should follow up with PCP in 1-2 days for re-evaluation if symptoms have not improved.  Discussed red flags and reasons to  return to UC or ED.  Pt and/or family verbalized understanding of diagnosis and follow up instructions and was offered informational handout on diagnosis.  PT discharged.

## 2020-06-17 LAB
BACTERIA UR CULT: ABNORMAL
BACTERIA UR CULT: ABNORMAL
SIGNIFICANT IND 70042: ABNORMAL
SITE SITE: ABNORMAL
SOURCE SOURCE: ABNORMAL

## 2020-06-17 ASSESSMENT — ENCOUNTER SYMPTOMS
ABDOMINAL PAIN: 1
SORE THROAT: 0
FEVER: 0
NAUSEA: 0
VOMITING: 0
ANOREXIA: 0
CHILLS: 0
CONSTIPATION: 0
DIARRHEA: 0
DIZZINESS: 0
HEARTBURN: 0
MYALGIAS: 0
FLANK PAIN: 0
BLOOD IN STOOL: 0

## 2020-06-18 LAB
CANDIDA DNA VAG QL PROBE+SIG AMP: NEGATIVE
G VAGINALIS DNA VAG QL PROBE+SIG AMP: NEGATIVE
T VAGINALIS DNA VAG QL PROBE+SIG AMP: NEGATIVE

## 2020-10-03 ENCOUNTER — OFFICE VISIT (OUTPATIENT)
Dept: URGENT CARE | Facility: CLINIC | Age: 25
End: 2020-10-03
Payer: COMMERCIAL

## 2020-10-03 ENCOUNTER — HOSPITAL ENCOUNTER (OUTPATIENT)
Facility: MEDICAL CENTER | Age: 25
End: 2020-10-03
Attending: NURSE PRACTITIONER
Payer: COMMERCIAL

## 2020-10-03 VITALS
TEMPERATURE: 97.1 F | HEIGHT: 66 IN | WEIGHT: 160 LBS | BODY MASS INDEX: 25.71 KG/M2 | DIASTOLIC BLOOD PRESSURE: 60 MMHG | SYSTOLIC BLOOD PRESSURE: 90 MMHG | RESPIRATION RATE: 20 BRPM | OXYGEN SATURATION: 97 % | HEART RATE: 80 BPM

## 2020-10-03 DIAGNOSIS — N94.10 DYSPAREUNIA IN FEMALE: ICD-10-CM

## 2020-10-03 LAB
INT CON NEG: NORMAL
INT CON POS: NORMAL
POC URINE PREGNANCY TEST: NORMAL

## 2020-10-03 PROCEDURE — 99214 OFFICE O/P EST MOD 30 MIN: CPT | Performed by: NURSE PRACTITIONER

## 2020-10-03 PROCEDURE — 81025 URINE PREGNANCY TEST: CPT | Performed by: NURSE PRACTITIONER

## 2020-10-03 PROCEDURE — 87491 CHLMYD TRACH DNA AMP PROBE: CPT

## 2020-10-03 PROCEDURE — 87480 CANDIDA DNA DIR PROBE: CPT

## 2020-10-03 PROCEDURE — 87660 TRICHOMONAS VAGIN DIR PROBE: CPT

## 2020-10-03 PROCEDURE — 87591 N.GONORRHOEAE DNA AMP PROB: CPT

## 2020-10-03 PROCEDURE — 87510 GARDNER VAG DNA DIR PROBE: CPT

## 2020-10-04 DIAGNOSIS — N94.10 DYSPAREUNIA IN FEMALE: ICD-10-CM

## 2020-10-04 LAB
C TRACH DNA SPEC QL NAA+PROBE: NEGATIVE
CANDIDA DNA VAG QL PROBE+SIG AMP: NEGATIVE
G VAGINALIS DNA VAG QL PROBE+SIG AMP: POSITIVE
N GONORRHOEA DNA SPEC QL NAA+PROBE: NEGATIVE
SPECIMEN SOURCE: NORMAL
T VAGINALIS DNA VAG QL PROBE+SIG AMP: NEGATIVE

## 2020-10-04 NOTE — PROGRESS NOTES
Chief Complaint   Patient presents with   • Vaginal Pain     x3 weeks. Pain during intercourse, watery discharge.       HISTORY OF PRESENT ILLNESS: Patient is a 24 y.o. female who presents to urgent care today with complaints of dyspareunia for the past 3 weeks.  She notes pain to internal vaginal canal, more right-sided, mostly with intercourse.  Also admits to white thin discharge and some intermittent vaginal spotting.  Denies any fever, chills, malaise, abdominal pain, nausea, vomiting, or urinary symptoms.  She does have an IUD, has had in place for 3 years without any issues.  She also has history of  x2.  She was tested for STIs in , resulted negative.  Her partner is without symptoms.    Patient Active Problem List    Diagnosis Date Noted   • Major depressive disorder, recurrent, mild (HCC) 06/15/2015   • S/P excision of fibroadenoma of breast 2013   • H/O gastric ulcer 11/15/2013   • Seasonal allergies 2011       Allergies:Lidocaine, Morphine, Novocain, Rocephin [ceftriaxone], and Zoloft    No current [facility]-ordered outpatient medications on file.     No current [facility]-ordered facility-administered medications on file.        Past Medical History:   Diagnosis Date   • Atypical chest pain 2014   • Chronic tonsillitis 2015   • Family planning 2013   • Fetal demise before 22 weeks with retention of dead fetus 2016   • Gastric ulcer    • Indigestion    • Panic anxiety syndrome 2013    associated with postpartum depression   • Pregnant    • PUD (peptic ulcer disease) 09   • Stillbirth        Social History     Tobacco Use   • Smoking status: Former Smoker     Types: Cigarettes     Quit date: 2017     Years since quitting: 3.2   • Smokeless tobacco: Never Used   • Tobacco comment: started at 14   Substance Use Topics   • Alcohol use: Yes     Alcohol/week: 0.0 oz     Frequency: 2-4 times a month     Drinks per session: 1 or 2     Binge frequency: Never      "Comment: occasional; not while pregnant   • Drug use: Yes     Frequency: 1.0 times per week     Types: Marijuana       Family Status   Relation Name Status   • PGMo  (Not Specified)   • MUnc  (Not Specified)   • Fa  (Not Specified)     Family History   Problem Relation Age of Onset   • Cancer Paternal Grandmother         lung cancer   • Heart Disease Maternal Uncle         MI age 45   • Cancer Father         HPV       ROS:  Review of Systems   Constitutional: Negative for fever, chills, weight loss, malaise, and fatigue.   HENT: Negative for ear pain, nosebleeds, congestion, sore throat and neck pain.    Eyes: Negative for vision changes.   Neuro: Negative for headache, sensory changes, weakness, seizure, LOC.   Cardiovascular: Negative for chest pain, palpitations, orthopnea and leg swelling.   Respiratory: Negative for cough, sputum production, shortness of breath and wheezing.   Gastrointestinal: Negative for abdominal pain, nausea, vomiting or diarrhea.   Genitourinary: Negative for dysuria, urgency and frequency.  GYN: Positive for vaginal pain, discharge, spotting  Musculoskeletal: Negative for falls, neck pain, back pain, joint pain, myalgias.   Skin: Negative for rash, diaphoresis.     Exam:  BP (!) 90/60   Pulse 80   Temp 36.2 °C (97.1 °F)   Resp 20   Ht 1.676 m (5' 6\")   Wt 72.6 kg (160 lb)   SpO2 97%   General: well-nourished, well-developed female in NAD  Head: normocephalic, atraumatic  Eyes: PERRLA, no conjunctival injection, acuity grossly intact, lids normal.  Ears: normal shape and symmetry, no tenderness, no discharge. External canals are without any significant edema or erythema. Tympanic membranes are without any inflammation, no effusion. Gross auditory acuity is intact.  Nose: symmetrical without tenderness, no discharge.  Mouth/Throat: reasonable hygiene, no erythema, exudates or tonsillar enlargement.  Neck: no masses, range of motion within normal limits, no tracheal deviation. No " obvious thyroid enlargement.   Lymph: no cervical adenopathy. No supraclavicular adenopathy.   Neuro: alert and oriented. Cranial nerves 1-12 grossly intact. No sensory deficit.   Cardiovascular: regular rate and rhythm. No edema.  Pulmonary: no distress. Chest is symmetrical with respiration, no wheezes, crackles, or rhonchi.   Abdomen: soft, non-tender, no guarding, no hepatosplenomegaly.  GYN: External genitalia is normal in appearance, internal examination shows moderate amount of thin white discharge.  No lesions, bleeding noted.  Unable to visualize strings from IUD.  Manual examination elicits pain to anterior vaginal canal without any masses or lesions noted.  Musculoskeletal: no clubbing, appropriate muscle tone, gait is stable.  Skin: warm, dry, intact, no clubbing, no cyanosis, no rashes.   Psych: appropriate mood, affect, judgement.       POC pregnancy negative      Assessment/Plan:  1. Dyspareunia in female  POCT Pregnancy    VAGINAL PATHOGENS DNA PANEL    Chlamydia/GC PCR Urine Or Swab    REFERRAL TO GYNECOLOGY       Patient presents to the clinic with dyspareunia for 3 weeks, specifically to her right proximal vaginal canal.  Upon examination I do not see any abnormalities in this area but cannot entirely visualize the region.  She did receive a GYN ultrasound in June, I reviewed this which was normal, IUD in place.  Pathogens as well as gonorrhea and chlamydia sampling obtained, sent for testing.  A referral to gynecology is placed.  Supportive care, differential diagnoses, and indications for immediate follow-up discussed with patient.   Pathogenesis of diagnosis discussed including typical length and natural progression.   Instructed to return to clinic or nearest emergency department for any change in condition, further concerns, or worsening of symptoms.  Patient states understanding of the plan of care and discharge instructions.          Please note that this dictation was created using voice  recognition software. I have made every reasonable attempt to correct obvious errors, but I expect that there are errors of grammar and possibly content that I did not discover before finalizing the note.      SANCHEZ Walsh.

## 2020-10-05 DIAGNOSIS — B96.89 BV (BACTERIAL VAGINOSIS): ICD-10-CM

## 2020-10-05 DIAGNOSIS — N76.0 BV (BACTERIAL VAGINOSIS): ICD-10-CM

## 2020-10-05 RX ORDER — METRONIDAZOLE 500 MG/1
500 TABLET ORAL 2 TIMES DAILY
Qty: 14 TAB | Refills: 0 | Status: SHIPPED | OUTPATIENT
Start: 2020-10-05 | End: 2020-10-12

## 2020-10-07 ENCOUNTER — TELEPHONE (OUTPATIENT)
Dept: URGENT CARE | Facility: CLINIC | Age: 25
End: 2020-10-07

## 2020-11-05 ENCOUNTER — HOSPITAL ENCOUNTER (OUTPATIENT)
Facility: MEDICAL CENTER | Age: 25
End: 2020-11-05
Attending: PHYSICIAN ASSISTANT
Payer: COMMERCIAL

## 2020-11-05 ENCOUNTER — OFFICE VISIT (OUTPATIENT)
Dept: URGENT CARE | Facility: CLINIC | Age: 25
End: 2020-11-05
Payer: COMMERCIAL

## 2020-11-05 VITALS
BODY MASS INDEX: 26.66 KG/M2 | TEMPERATURE: 97.5 F | RESPIRATION RATE: 14 BRPM | OXYGEN SATURATION: 97 % | DIASTOLIC BLOOD PRESSURE: 56 MMHG | SYSTOLIC BLOOD PRESSURE: 98 MMHG | HEART RATE: 93 BPM | HEIGHT: 65 IN | WEIGHT: 160 LBS

## 2020-11-05 DIAGNOSIS — N94.10 DYSPAREUNIA IN FEMALE: ICD-10-CM

## 2020-11-05 DIAGNOSIS — Z20.828 EXPOSURE TO VIRAL DISEASE: ICD-10-CM

## 2020-11-05 DIAGNOSIS — N76.0 BV (BACTERIAL VAGINOSIS): ICD-10-CM

## 2020-11-05 DIAGNOSIS — B96.89 BV (BACTERIAL VAGINOSIS): ICD-10-CM

## 2020-11-05 PROCEDURE — 99214 OFFICE O/P EST MOD 30 MIN: CPT | Mod: CS | Performed by: PHYSICIAN ASSISTANT

## 2020-11-05 PROCEDURE — U0003 INFECTIOUS AGENT DETECTION BY NUCLEIC ACID (DNA OR RNA); SEVERE ACUTE RESPIRATORY SYNDROME CORONAVIRUS 2 (SARS-COV-2) (CORONAVIRUS DISEASE [COVID-19]), AMPLIFIED PROBE TECHNIQUE, MAKING USE OF HIGH THROUGHPUT TECHNOLOGIES AS DESCRIBED BY CMS-2020-01-R: HCPCS

## 2020-11-05 RX ORDER — METRONIDAZOLE 7.5 MG/G
1 GEL VAGINAL
Qty: 5 EACH | Refills: 0 | Status: SHIPPED | OUTPATIENT
Start: 2020-11-05 | End: 2020-11-10

## 2020-11-05 ASSESSMENT — ENCOUNTER SYMPTOMS
SHORTNESS OF BREATH: 0
ABDOMINAL PAIN: 1
SORE THROAT: 0
DIARRHEA: 0
EYE PAIN: 0
MYALGIAS: 0
HEADACHES: 1
CHILLS: 1
CONSTIPATION: 0
COUGH: 0
VOMITING: 1
NAUSEA: 1
FEVER: 1

## 2020-11-05 NOTE — PATIENT INSTRUCTIONS
Viral syndrome and Novel Coronavirus (COVID-19)       You have a viral syndrome which may include symptoms like muscle aches, fevers, chills, runny nose, cough, sneezing, sore throat, vomiting or diarrhea.  One of the potential viruses you may have is SARSCoV-2, the virus that causes COVID-19, also known as the novel coronavirus.  You may be just as likely to have a different viral infection such as the common cold or flu.  Most patients with COVID -19 have mild symptoms and recover on their own. Resting, staying hydrated, and sleeping are typically helpful.  As of today's visit, you are well enough to go home and treat your symptoms with oral fluids, medicines for fevers, cough, pain, etc.        COVID 19 testing is not performed on most people with mild symptoms who are being discharged from the Emergency Department or Clinic.      If COVID 19 testing was performed, the results will not be available for up to 4 days.  Please DO NOT CONTACT THE EMERGENCY DEPARTMENT OR CLINIC FOR RESULTS OF THIS TEST.       You will be contacted by a member of the [facility] team with your results and for further discussion.       Please follow the precautions below:      • Stay home except to get medical care.   • As advised by the Centers for Disease Control and Prevention (CDC), we recommend you stay in your home and minimize contact with others to avoid spreading this infection.   • The elderly or anyone with significant medical issues may have more severe symptoms from this infection. We recommend separation, also known as self-isolation, for at least 7 days after your first day of symptoms and several more after that if you are still sick. The most important action is wait for at least  a week and several more days after you feel well before returning to you regular activities, work or school. If you become sicker, like difficulty breathing, chest pain, you are unable to eat or drink enough, or have severe vomiting,  "diarrhea or weakness, you may need to return to the Emergency Department or contact your clinic provider for re-evaluation.    • You should restrict activities outside your home, except for getting medical care. Do not go to work, school, or public areas. Avoid using public transportation, ride-sharing, or taxis.   • Separate yourself from other people and animals in your home.   • As much as possible, you should stay in a specific room and away from other people in your home. Also, you should use a separate bathroom, if available.   • Avoid sharing personal household items. You should not share dishes, drinking glasses, cups, eating utensils, towels, or bedding with other people in your home. After using these items, they should be washed thoroughly with soap and water.         Precautions continued:    • Clean all \"high-touch\" surfaces every day high touch surfaces include counters, tabletops, doorknobs, bathroom fixtures, toilets, phones, keyboards, tablets, and bedside tables. Also, clean any surfaces that may have blood, stool, or body fluids on them. Use a household cleaning   spray or wipe, according to the label instructions. Labels contain instructions for safe and effective use of the cleaning product including precautions you should take when applying the product, such as wearing gloves and making sure you have good ventilation during use of the product.   • Clean your hands often. Wash your hands often with soap and water for at least 20 seconds. If soap and water are not available, clean your hands with an alcohol-based hand  that contains at least 60% alcohol, covering all surfaces of your hands and rubbing them together until they feel dry. Soap and water should be used preferentially if hands are visibly dirty. Avoid touching your eyes, nose, and mouth with unwashed hands.   • Cover your coughs and sneezes    • Cover your mouth and nose with a tissue when you cough or sneeze   • Throw used " tissues in a lined trash can; immediately wash your hands with soap and water for at least 20 seconds or clean your hands with an alcohol-based hand  that contains at least 60 to 95% alcohol, covering all surfaces of your hands and rubbing them together until they feel dry. Soap and water should be used preferentially if hands are visibly dirty.     • When seeking care at a healthcare facility:    · Seek prompt medical attention if your illness is worsening (e.g., difficulty breathing).    · Put on a facemask before you enter the facility.    · These steps will help the healthcare provider's office to keep other people in the office or waiting room from getting infected or exposed.    · If possible, put on a facemask before emergency medical services arrive.      Please see the resources below for more information.      CDC Corona Website https://www.cdc.gov/coronavirus/2019-ncov/index.html    General Information https://www.cdc.gov/coronavirus/2019-ncov/faq.html      State [facility] Health: [phone]     [facility] Health Line [phone]

## 2020-11-05 NOTE — PROGRESS NOTES
Subjective:   Sandy Yang is a 24 y.o. female who presents for Abdominal Pain (middle to the L side x 4 days, nausea , vommiting and headache.  Hx of Ulcer..  Sister is Posiitve for Covid 19. )      This is a 24-year-old female presenting to urgent care with several complaints.  Her sister who she has very close contact with is positive for Covid and subsequent to finding out this diagnosis the patient started developed nausea and vomiting, generalized malaise, headache and body aches.  She had the worst of her symptoms around 4 days ago and is actually trended towards improvement since then.  Subsequent to her nausea and vomiting episodes 4 days ago she developed a worsening of her abdominal pain consistent with her previous issue of gastritis and gastric ulcers.  She is currently self treating with Pepcid and is noticed minimal relief.  She reports her pain level waxes and wanes throughout the day and is currently around a 3 out of 10.  She is not noticed any abnormal bowel movements.  She has been sober from alcohol for around 4 months.    Additionally the patient was seen around 1 month ago and diagnosed with bacterial vaginosis and completed a course of metronidazole.  She reports that she had almost complete resolution however had her menstrual cycle with spotting and then subsequently had a worsening of the discharge with foul smell.  She has 1 consistent sexual partner no other exposures.  She has not noticed any vaginal rashes or lesions and her dyspareunia has mostly resolved however seems to be worsening consistent with the increase of discharge.      Review of Systems   Constitutional: Positive for chills, fever and malaise/fatigue.   HENT: Negative for congestion, ear pain and sore throat.    Eyes: Negative for pain.   Respiratory: Negative for cough and shortness of breath.    Cardiovascular: Negative for chest pain.   Gastrointestinal: Positive for abdominal pain, nausea and vomiting.  "Negative for constipation and diarrhea.   Genitourinary: Negative for dysuria.   Musculoskeletal: Negative for myalgias.   Skin: Negative for rash.   Neurological: Positive for headaches.       Medications:    • This patient does not have an active medication from one of the medication groupers.    Allergies: Lidocaine, Morphine, Novocain, Rocephin [ceftriaxone], and Zoloft    Problem List: Sandy Yang has Seasonal allergies; H/O gastric ulcer; S/P excision of fibroadenoma of breast; and Major depressive disorder, recurrent, mild (HCC) on their problem list.    Surgical History:  Past Surgical History:   Procedure Laterality Date   • REPEAT C SECTION  7/18/2017    Procedure: REPEAT C SECTION;  Surgeon: Oilve Valle M.D.;  Location: LABOR AND DELIVERY;  Service:    • BREAST BIOPSY  2/19/2014    Performed by Reid Adorno M.D. at SURGERY [facility] ORS   • PRIMARY C SECTION  8/9/12    for breech   • GASTROSCOPY  12/23/2009    Performed by SUSANA MUÑOZ at SURGERY [facility] ORS       Past Social Hx: Sandy Yang  reports that she quit smoking about 3 years ago. Her smoking use included cigarettes. She has never used smokeless tobacco. She reports current alcohol use. She reports current drug use. Frequency: 1.00 time per week. Drug: Marijuana.     Past Family Hx:  Sandy Yang family history includes Cancer in her father and paternal grandmother; Heart Disease in her maternal uncle.     Problem list, medications, and allergies reviewed by myself today in Epic.     Objective:     BP (!) 98/56   Pulse 93   Temp 36.4 °C (97.5 °F) (Temporal)   Resp 14   Ht 1.651 m (5' 5\")   Wt 72.6 kg (160 lb)   SpO2 97%   BMI 26.63 kg/m²     Physical Exam  Vitals signs reviewed.   Constitutional:       General: She is not in acute distress.     Appearance: Normal appearance. She is not ill-appearing or toxic-appearing.   HENT:      Head: Normocephalic and atraumatic.      " Right Ear: External ear normal.      Left Ear: External ear normal.      Nose: Nose normal. No congestion or rhinorrhea.      Mouth/Throat:      Mouth: Mucous membranes are moist.   Eyes:      Conjunctiva/sclera: Conjunctivae normal.   Neck:      Musculoskeletal: Normal range of motion.   Cardiovascular:      Rate and Rhythm: Normal rate.   Pulmonary:      Effort: Pulmonary effort is normal.   Abdominal:      General: Abdomen is flat.      Palpations: Abdomen is soft.      Tenderness: There is no abdominal tenderness. There is no right CVA tenderness, left CVA tenderness, guarding or rebound.      Comments: Benign abdominal exam.  Negative Day's, negative Rovsing's, negative McBurney's   Genitourinary:     Comments: Exam deferred  Skin:     General: Skin is warm and dry.      Capillary Refill: Capillary refill takes less than 2 seconds.   Neurological:      Mental Status: She is alert and oriented to person, place, and time.         Assessment/Plan:     Diagnosis and associated orders:     1. Exposure to viral disease  COVID/SARS COV-2 PCR    metroNIDAZOLE (METROGEL-VAGINAL) 0.75 % Gel   2. BV (bacterial vaginosis)     3. Dyspareunia in female        Comments/MDM:     • We will test for Covid.  Standard precautions as listed below.  •   • We will treat her recurrence of her bacterial vaginosis with intravaginal gel.  Patient given specific instructions on use, recommended pelvic rest.  Advised to follow-up with OB/GYN, she has a pending referral.  Advise return precautions.  No new exposures so we will not pursue repeat testing given the likelihood this is a recurrence of her BV.  Educated patient on bacterial vaginosis and the likely shifts of pH being causal  •   • Advised continue Pepcid, add on Prilosec in the a.m. and Maalox if continues have symptoms.  Patient trending towards improvement.  Her history of gastritis/gastric ulcer is probably exacerbated by her recent nausea and vomiting.  ER precautions  given.  Benign abdominal exam.    • Patient's vital signs are reassuring and they appear hemodynamically stable and do not require higher level care at this time  • I discussed self isolation and provided printed instructions (if applicable)  • I discussed ER precautions and provided printed instructions (if applicable)  • I discussed returning to clinic for mild symptoms or reevaluation  • I educated the patient on possibility of a false-negative test and indications for repeat testing  • I instructed the patient to try to follow up with their PCP (if applicable) for follow up care  • I provided the patient the printed AVS which contains information about signing up for MyChart (if applicable)  • I will contact the patient with results via [email or web address]  • If requested, I provided the patient with a work note to provide to their employer or school regarding returning to work and discontinuation of self isolation.  • All questions were answered and patient demonstrated verbal understanding of above.  • I followed all reasonable PPE precautions during this encounter including but not limited to use of an N95 mask, gloves, and gown if indicated.               Differential diagnosis, natural history, supportive care, and indications for immediate follow-up discussed.    Advised the patient to follow-up with the primary care physician for recheck, reevaluation, and consideration of further management.    Please note that this dictation was created using voice recognition software. I have made a reasonable attempt to correct obvious errors, but I expect that there are errors of grammar and possibly content that I did not discover before finalizing the note.    This note was electronically signed by Navdeep Deng PA-C

## 2020-11-05 NOTE — LETTER
November 5, 2020    To Whom It May Concern:         This is confirmation that Sandy Yang attended her scheduled  Your employee was seen in our clinic today.  A concern for COVID-19 has been identified and testing is in progress.     We are asking you to excuse absences while following self-isolation protocol per Center for Disease Control (CDC) guidelines.  Your employee will be able to access test results through our electronic delivery system called [employer].     If the results of testing are negative, and once there has been no fever (temperature >100.4 F) for at least 72 hours without treatment, and no vomiting or diarrhea for at least 48 hours, then return to work is approved.    If the results of testing are positive then your employee will be contacted by the [facility] or [facility] for further instructions on duration of self-isolation and return to work protocol. In general, this will also follow the CDC guidelines with a minimum of 10 days from the onset of symptoms and without fever, vomiting, or diarrhea as above.     In general, repeat testing is not necessary and not offered through our [facility].     This is the only note that will be provided from [facility] for this visit.  Your employee will require an appointment with a primary care provider if FMLA or disability forms are required.         If you have any questions please do not hesitate to call me at the phone number listed below.    Sincerely,          FRANCISCO J Malloy.-C.  [phone]

## 2020-11-06 DIAGNOSIS — Z20.828 EXPOSURE TO VIRAL DISEASE: ICD-10-CM

## 2020-11-07 LAB
COVID ORDER STATUS COVID19: NORMAL
SARS-COV-2 RNA RESP QL NAA+PROBE: NOTDETECTED
SPECIMEN SOURCE: NORMAL

## 2020-11-08 NOTE — RESULT ENCOUNTER NOTE
"The patient's covid results came back as \"Not Detected\" which would indicate the patient's symptoms are not a result of the novel coronavirus covid-19.  During our patient visit I discussed with the patient the likelihood of a false negative as well as supportive care.  We agree that I would contact the patient via telephone if the results were POSITIVE, and I would notify them via [email or web address] if the results were NEGATIVE.  I will therefore send them a message via [email or web address]." What Is The Reason For Today's Visit?: History of Non-Melanoma Skin Cancer How Many Skin Cancers Have You Had?: more than one

## 2020-12-08 ENCOUNTER — SUPERVISING PHYSICIAN REVIEW (OUTPATIENT)
Dept: URGENT CARE | Facility: CLINIC | Age: 25
End: 2020-12-08

## 2020-12-08 NOTE — PROGRESS NOTES
I have reviewed and agree with history, assessment and plan for office encounter on 11/5/2020 with Advanced Practice Provider: Navdeep Deng PAC.  Face to face encounter/direct observation: No  Suggested changes to plan or follow-up: none   Enrique Morel M.D.

## 2020-12-24 ENCOUNTER — OFFICE VISIT (OUTPATIENT)
Dept: MEDICAL GROUP | Facility: MEDICAL CENTER | Age: 25
End: 2020-12-24
Attending: INTERNAL MEDICINE
Payer: COMMERCIAL

## 2020-12-24 VITALS
DIASTOLIC BLOOD PRESSURE: 80 MMHG | RESPIRATION RATE: 16 BRPM | SYSTOLIC BLOOD PRESSURE: 130 MMHG | OXYGEN SATURATION: 97 % | WEIGHT: 159 LBS | HEIGHT: 65 IN | BODY MASS INDEX: 26.49 KG/M2 | TEMPERATURE: 97.9 F | HEART RATE: 96 BPM

## 2020-12-24 DIAGNOSIS — Z85.828 HISTORY OF BASAL CELL CARCINOMA (BCC) OF SKIN: ICD-10-CM

## 2020-12-24 DIAGNOSIS — N89.8 VAGINAL DISCHARGE: ICD-10-CM

## 2020-12-24 DIAGNOSIS — R53.83 FATIGUE, UNSPECIFIED TYPE: ICD-10-CM

## 2020-12-24 DIAGNOSIS — L98.9 SKIN LESION: ICD-10-CM

## 2020-12-24 PROBLEM — Z97.5 IUD (INTRAUTERINE DEVICE) IN PLACE: Status: ACTIVE | Noted: 2020-12-24

## 2020-12-24 LAB
INT CON NEG: NEGATIVE
INT CON POS: POSITIVE
POC URINE PREGNANCY TEST: NEGATIVE

## 2020-12-24 PROCEDURE — 99213 OFFICE O/P EST LOW 20 MIN: CPT | Performed by: INTERNAL MEDICINE

## 2020-12-24 PROCEDURE — 81025 URINE PREGNANCY TEST: CPT | Performed by: INTERNAL MEDICINE

## 2020-12-24 PROCEDURE — 99204 OFFICE O/P NEW MOD 45 MIN: CPT | Performed by: INTERNAL MEDICINE

## 2020-12-24 RX ORDER — METRONIDAZOLE 500 MG/1
500 TABLET ORAL 2 TIMES DAILY
Qty: 14 TAB | Refills: 0 | Status: SHIPPED | OUTPATIENT
Start: 2020-12-24 | End: 2020-12-31

## 2020-12-24 SDOH — HEALTH STABILITY: MENTAL HEALTH: HOW MANY STANDARD DRINKS CONTAINING ALCOHOL DO YOU HAVE ON A TYPICAL DAY?: NOT ASKED

## 2020-12-24 SDOH — HEALTH STABILITY: MENTAL HEALTH: HOW OFTEN DO YOU HAVE 6 OR MORE DRINKS ON ONE OCCASION?: NOT ASKED

## 2020-12-24 SDOH — HEALTH STABILITY: MENTAL HEALTH: HOW OFTEN DO YOU HAVE A DRINK CONTAINING ALCOHOL?: NOT ASKED

## 2020-12-24 ASSESSMENT — PATIENT HEALTH QUESTIONNAIRE - PHQ9
2. FEELING DOWN, DEPRESSED, IRRITABLE, OR HOPELESS: NOT AT ALL
SUM OF ALL RESPONSES TO PHQ QUESTIONS 1-9: 0
5. POOR APPETITE OR OVEREATING: NOT AT ALL
9. THOUGHTS THAT YOU WOULD BE BETTER OFF DEAD, OR OF HURTING YOURSELF: NOT AT ALL
8. MOVING OR SPEAKING SO SLOWLY THAT OTHER PEOPLE COULD HAVE NOTICED. OR THE OPPOSITE, BEING SO FIGETY OR RESTLESS THAT YOU HAVE BEEN MOVING AROUND A LOT MORE THAN USUAL: NOT AT ALL
1. LITTLE INTEREST OR PLEASURE IN DOING THINGS: NOT AT ALL
4. FEELING TIRED OR HAVING LITTLE ENERGY: NOT AT ALL
SUM OF ALL RESPONSES TO PHQ9 QUESTIONS 1 AND 2: 0
6. FEELING BAD ABOUT YOURSELF - OR THAT YOU ARE A FAILURE OR HAVE LET YOURSELF OR YOUR FAMILY DOWN: NOT AL ALL
7. TROUBLE CONCENTRATING ON THINGS, SUCH AS READING THE NEWSPAPER OR WATCHING TELEVISION: NOT AT ALL
3. TROUBLE FALLING OR STAYING ASLEEP OR SLEEPING TOO MUCH: NOT AT ALL

## 2020-12-24 NOTE — ASSESSMENT & PLAN NOTE
She had a basal cell carcinoma excised from her anterior chest wall approximately 1 year ago.  After that, reports she was post get skin checks with a dermatologist every 6 months but has been unable to do so due to her insurance.  She currently has a suspicious lesion on her forehead as described.

## 2020-12-24 NOTE — ASSESSMENT & PLAN NOTE
She reports increased fatigue for the past few weeks as well as some associated nausea.  Although she has an IUD in place, she is concerned for possible pregnancy and is requesting pregnancy test today.  With her IUD, she does not get regular menses.  She has had some intermittent spotting lately.

## 2020-12-24 NOTE — PROGRESS NOTES
Sandy Yang is a 25 y.o. female here for requesting pregnancy testing, vaginal discharge, skin lesion, establish care  HPI: Previous PCP was Dr. Arroyo  Fatigue  She reports increased fatigue for the past few weeks as well as some associated nausea.  Although she has an IUD in place, she is concerned for possible pregnancy and is requesting pregnancy test today.  With her IUD, she does not get regular menses.  She has had some intermittent spotting lately.      Vaginal discharge  She reports increased vaginal discharge for about the past week.  She states that it has a foul smelling odor.  She has a history of bacterial vaginosis and reports symptoms are similar.  She is currently sexually active with one partner.  She denies dyspareunia, pelvic pain, fevers, chills.      Skin lesion  She reports that about 4 weeks ago she noticed a patch of dry erythematous skin along her hairline on the left.  She thinks it has gotten slightly larger over this time period.  It has not been itchy or bled.  She tried some topical hydrocortisone on it but reports no change.  She is concerned about it due to her history of previous skin cancer and she is requesting referral to dermatology.      History of basal cell carcinoma (BCC) of skin  She had a basal cell carcinoma excised from her anterior chest wall approximately 1 year ago.  After that, reports she was post get skin checks with a dermatologist every 6 months but has been unable to do so due to her insurance.  She currently has a suspicious lesion on her forehead as described.    Current medicines (including changes today)  Current Outpatient Medications   Medication Sig Dispense Refill   • metroNIDAZOLE (FLAGYL) 500 MG Tab Take 1 Tab by mouth 2 Times a Day for 7 days. 14 Tab 0     No current facility-administered medications for this visit.      She  has a past medical history of Cancer (HCC), Panic anxiety syndrome (7/8/2013), and PUD (peptic ulcer  disease).  She  has a past surgical history that includes gastroscopy (12/23/2009); primary c section (8/9/12); breast biopsy (2/19/2014); and repeat c section (7/18/2017).  Social History     Tobacco Use   • Smoking status: Current Every Day Smoker     Packs/day: 0.50     Years: 4.00     Pack years: 2.00     Types: Cigarettes   • Smokeless tobacco: Never Used   • Tobacco comment: 6 cig/day   Substance Use Topics   • Alcohol use: Not Currently     Alcohol/week: 0.0 oz   • Drug use: Not Currently     Types: Marijuana     Social History     Social History Narrative    mom is Keara Johnston.     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 Has 14 month old. Lives with BF    2017: studying for her associates. Plans to go to [employer]     Family History   Problem Relation Age of Onset   • Cancer Paternal Grandmother         lung cancer   • Heart Disease Maternal Uncle         MI age 45   • Cancer Father         skin   • Diabetes Maternal Grandfather    • Heart Disease Maternal Grandfather          ROS  As above in HPI  All other systems reviewed and are negative     Objective:     Vitals:    12/24/20 1318   BP: 130/80   Pulse: 96   Resp: 16   Temp: 36.6 °C (97.9 °F)   SpO2: 97%     Body mass index is 26.46 kg/m².  Physical Exam:    Constitutional: Alert, no distress.  Skin: Warm, dry, good turgor, no rashes in visible areas, proximately dime sized reddish patch over left forehead along the hairline with slight scale.  Eye: Equal, round and reactive, conjunctiva clear, lids normal.  ENMT: Lips without lesions, good dentition, oropharynx clear, TM's clear bilaterally.  Neck: Trachea midline, no masses, no thyromegaly. No cervical or supraclavicular lymphadenopathy.  Respiratory: Unlabored respiratory effort, lungs clear to auscultation, no wheezes, no ronchi.  Cardiovascular: Regular rate and rhythm, no murmurs appreciated, no lower extremity edema.  Abdomen: Soft, non-tender, no masses, no  hepatosplenomegaly.  Psych: Alert and oriented x3, normal affect and mood.    Results:   POC pregnancy test in clinic today was negative    Assessment and Plan:   The following treatment plan was discussed    1. Fatigue, unspecified type  With negative pregnancy test today  - POCT Pregnancy    2. Vaginal discharge  Presumed she has a recurrence of her bacterial vaginosis.  We will treat empirically as below.  Discussed that if symptoms do not resolve she needs to return for testing.  - metroNIDAZOLE (FLAGYL) 500 MG Tab; Take 1 Tab by mouth 2 Times a Day for 7 days.  Dispense: 14 Tab; Refill: 0    3. Skin lesion  Unclear diagnosis.  I would favor actinic keratosis versus dermatitis.  It does not have the classic features of a basal cell carcinoma.  Discussed with patient that we could try cryotherapy or biopsy but at this point she is most comfortable following up with dermatology due to her history of basal cell carcinoma.  I have placed a referral today.  She is aware that she may need to travel to [place] for this and information for the process including MTM was described to her.  - REFERRAL TO DERMATOLOGY    4. History of basal cell carcinoma (BCC) of skin  - REFERRAL TO DERMATOLOGY        Followup: Return if symptoms worsen or fail to improve, for PAP.

## 2020-12-24 NOTE — ASSESSMENT & PLAN NOTE
She reports that about 4 weeks ago she noticed a patch of dry erythematous skin along her hairline on the left.  She thinks it has gotten slightly larger over this time period.  It has not been itchy or bled.  She tried some topical hydrocortisone on it but reports no change.  She is concerned about it due to her history of previous skin cancer and she is requesting referral to dermatology.

## 2020-12-24 NOTE — ASSESSMENT & PLAN NOTE
She reports increased vaginal discharge for about the past week.  She states that it has a foul smelling odor.  She has a history of bacterial vaginosis and reports symptoms are similar.  She is currently sexually active with one partner.  She denies dyspareunia, pelvic pain, fevers, chills.

## 2021-01-07 ENCOUNTER — OFFICE VISIT (OUTPATIENT)
Dept: MEDICAL GROUP | Facility: MEDICAL CENTER | Age: 26
End: 2021-01-07
Attending: INTERNAL MEDICINE
Payer: COMMERCIAL

## 2021-01-07 ENCOUNTER — HOSPITAL ENCOUNTER (OUTPATIENT)
Facility: MEDICAL CENTER | Age: 26
End: 2021-01-07
Attending: INTERNAL MEDICINE
Payer: COMMERCIAL

## 2021-01-07 VITALS
RESPIRATION RATE: 16 BRPM | HEART RATE: 84 BPM | HEIGHT: 65 IN | OXYGEN SATURATION: 96 % | DIASTOLIC BLOOD PRESSURE: 84 MMHG | TEMPERATURE: 97.5 F | WEIGHT: 148 LBS | BODY MASS INDEX: 24.66 KG/M2 | SYSTOLIC BLOOD PRESSURE: 122 MMHG

## 2021-01-07 DIAGNOSIS — N89.8 VAGINAL DISCHARGE: ICD-10-CM

## 2021-01-07 DIAGNOSIS — Z12.4 SCREENING FOR MALIGNANT NEOPLASM OF CERVIX: ICD-10-CM

## 2021-01-07 DIAGNOSIS — B37.31 VAGINAL CANDIDA: ICD-10-CM

## 2021-01-07 PROCEDURE — 87660 TRICHOMONAS VAGIN DIR PROBE: CPT

## 2021-01-07 PROCEDURE — 87491 CHLMYD TRACH DNA AMP PROBE: CPT

## 2021-01-07 PROCEDURE — 87591 N.GONORRHOEAE DNA AMP PROB: CPT

## 2021-01-07 PROCEDURE — 99213 OFFICE O/P EST LOW 20 MIN: CPT | Mod: 25 | Performed by: INTERNAL MEDICINE

## 2021-01-07 PROCEDURE — 87510 GARDNER VAG DNA DIR PROBE: CPT

## 2021-01-07 PROCEDURE — G0101 CA SCREEN;PELVIC/BREAST EXAM: HCPCS | Performed by: INTERNAL MEDICINE

## 2021-01-07 PROCEDURE — 88175 CYTOPATH C/V AUTO FLUID REDO: CPT

## 2021-01-07 PROCEDURE — 87480 CANDIDA DNA DIR PROBE: CPT

## 2021-01-07 RX ORDER — FLUCONAZOLE 150 MG/1
150 TABLET ORAL DAILY
Qty: 1 TAB | Refills: 0 | Status: SHIPPED | OUTPATIENT
Start: 2021-01-07 | End: 2021-08-05

## 2021-01-07 RX ORDER — METRONIDAZOLE 500 MG/1
TABLET ORAL
COMMUNITY
Start: 2021-01-06 | End: 2021-08-05

## 2021-01-07 ASSESSMENT — PATIENT HEALTH QUESTIONNAIRE - PHQ9: CLINICAL INTERPRETATION OF PHQ2 SCORE: 0

## 2021-01-07 NOTE — PROGRESS NOTES
Subjective:   Sandy Yang is a 25 y.o. female here today for PAP    Screening for malignant neoplasm of cervix  She presents today for Pap.  Her last was over 3 years ago and normal.  She denies history of abnormal Paps.  She is currently experiencing some vaginal discharge.  She recently started taking the metronidazole 3 days ago for concern for bacterial vaginosis.  She denies abnormal vaginal bleeding, pelvic pain, dysuria, dyspareunia.  She is requesting STI testing.    Vaginal discharge  She reports some mildly increased vaginal discharge over the past several days.  She is concerned she may have developed a yeast infection since starting the metronidazole.       Current medicines (including changes today)  Current Outpatient Medications   Medication Sig Dispense Refill   • fluconazole (DIFLUCAN) 150 MG tablet Take 1 Tab by mouth every day. 1 Tab 0     No current facility-administered medications for this visit.      She  has a past medical history of Cancer (HCC), Panic anxiety syndrome (7/8/2013), and PUD (peptic ulcer disease).    ROS   Denies chest pain, shortness of breat  As above in HPI     Objective:     Vitals:    01/07/21 1438   BP: 122/84   Pulse: 84   Resp: 16   Temp: 36.4 °C (97.5 °F)   SpO2: 96%     Body mass index is 24.63 kg/m².   Physical Exam:  Constitutional: Alert, no distress.  Skin: Warm, dry, good turgor, no rashes in visible areas.  : external genitalia normal, moderate amount of thick white discharge adherent to vaginal walls consistent with yeast infection, IUD strings in place, no cervical lesions, cervical mucosa slightly friable, no cervical motion tenderness      Assessment and Plan:   The following treatment plan was discussed    1. Vaginal candida  Exam strongly suggests yeast infection, will treat as below  - fluconazole (DIFLUCAN) 150 MG tablet; Take 1 Tab by mouth every day.  Dispense: 1 Tab; Refill: 0    2. Screening for malignant neoplasm of cervix  -  THINPREP RFLX HPV ASCUS W/CTNG; Future    3. Vaginal discharge  - VAGINAL PATHOGENS DNA PANEL; Future        Followup: Return if symptoms worsen or fail to improve.

## 2021-01-07 NOTE — ASSESSMENT & PLAN NOTE
She presents today for Pap.  Her last was over 3 years ago and normal.  She denies history of abnormal Paps.  She is currently experiencing some vaginal discharge.  She recently started taking the metronidazole 3 days ago for concern for bacterial vaginosis.  She denies abnormal vaginal bleeding, pelvic pain, dysuria, dyspareunia.  She is requesting STI testing.

## 2021-01-07 NOTE — ASSESSMENT & PLAN NOTE
She reports some mildly increased vaginal discharge over the past several days.  She is concerned she may have developed a yeast infection since starting the metronidazole.

## 2021-01-08 DIAGNOSIS — Z12.4 SCREENING FOR MALIGNANT NEOPLASM OF CERVIX: ICD-10-CM

## 2021-01-08 DIAGNOSIS — N89.8 VAGINAL DISCHARGE: ICD-10-CM

## 2021-01-08 LAB
CANDIDA DNA VAG QL PROBE+SIG AMP: NEGATIVE
G VAGINALIS DNA VAG QL PROBE+SIG AMP: POSITIVE
T VAGINALIS DNA VAG QL PROBE+SIG AMP: NEGATIVE

## 2021-01-11 LAB
C TRACH DNA GENITAL QL NAA+PROBE: NEGATIVE
CYTOLOGY REG CYTOL: NORMAL
N GONORRHOEA DNA GENITAL QL NAA+PROBE: NEGATIVE
SPECIMEN SOURCE: NORMAL

## 2021-01-26 ENCOUNTER — PATIENT MESSAGE (OUTPATIENT)
Dept: MEDICAL GROUP | Facility: MEDICAL CENTER | Age: 26
End: 2021-01-26

## 2021-01-26 DIAGNOSIS — R30.0 DYSURIA: ICD-10-CM

## 2021-01-26 RX ORDER — NITROFURANTOIN 25; 75 MG/1; MG/1
100 CAPSULE ORAL 2 TIMES DAILY
Qty: 10 CAP | Refills: 0 | Status: SHIPPED | OUTPATIENT
Start: 2021-01-26 | End: 2021-01-31

## 2021-01-27 ENCOUNTER — HOSPITAL ENCOUNTER (OUTPATIENT)
Facility: MEDICAL CENTER | Age: 26
End: 2021-01-27
Attending: PHYSICIAN ASSISTANT
Payer: COMMERCIAL

## 2021-01-27 ENCOUNTER — OFFICE VISIT (OUTPATIENT)
Dept: URGENT CARE | Facility: CLINIC | Age: 26
End: 2021-01-27
Payer: COMMERCIAL

## 2021-01-27 VITALS
WEIGHT: 147 LBS | BODY MASS INDEX: 23.63 KG/M2 | HEIGHT: 66 IN | HEART RATE: 89 BPM | RESPIRATION RATE: 14 BRPM | DIASTOLIC BLOOD PRESSURE: 82 MMHG | SYSTOLIC BLOOD PRESSURE: 118 MMHG | TEMPERATURE: 97.4 F | OXYGEN SATURATION: 99 %

## 2021-01-27 DIAGNOSIS — N30.00 ACUTE CYSTITIS WITHOUT HEMATURIA: ICD-10-CM

## 2021-01-27 DIAGNOSIS — J06.9 UPPER RESPIRATORY TRACT INFECTION, UNSPECIFIED TYPE: ICD-10-CM

## 2021-01-27 LAB
APPEARANCE UR: NORMAL
BILIRUB UR STRIP-MCNC: NEGATIVE MG/DL
COLOR UR AUTO: YELLOW
COVID ORDER STATUS COVID19: NORMAL
GLUCOSE UR STRIP.AUTO-MCNC: NEGATIVE MG/DL
INT CON NEG: NEGATIVE
INT CON POS: POSITIVE
KETONES UR STRIP.AUTO-MCNC: NEGATIVE MG/DL
LEUKOCYTE ESTERASE UR QL STRIP.AUTO: NORMAL
NITRITE UR QL STRIP.AUTO: NEGATIVE
PH UR STRIP.AUTO: 6.5 [PH] (ref 5–8)
POC URINE PREGNANCY TEST: NEGATIVE
PROT UR QL STRIP: NEGATIVE MG/DL
RBC UR QL AUTO: NEGATIVE
SP GR UR STRIP.AUTO: 1.02
UROBILINOGEN UR STRIP-MCNC: 0.2 MG/DL

## 2021-01-27 PROCEDURE — U0005 INFEC AGEN DETEC AMPLI PROBE: HCPCS

## 2021-01-27 PROCEDURE — 87186 SC STD MICRODIL/AGAR DIL: CPT

## 2021-01-27 PROCEDURE — 87086 URINE CULTURE/COLONY COUNT: CPT

## 2021-01-27 PROCEDURE — 87077 CULTURE AEROBIC IDENTIFY: CPT

## 2021-01-27 PROCEDURE — 99214 OFFICE O/P EST MOD 30 MIN: CPT | Performed by: PHYSICIAN ASSISTANT

## 2021-01-27 PROCEDURE — 81002 URINALYSIS NONAUTO W/O SCOPE: CPT | Performed by: PHYSICIAN ASSISTANT

## 2021-01-27 PROCEDURE — 81025 URINE PREGNANCY TEST: CPT | Performed by: PHYSICIAN ASSISTANT

## 2021-01-27 PROCEDURE — U0003 INFECTIOUS AGENT DETECTION BY NUCLEIC ACID (DNA OR RNA); SEVERE ACUTE RESPIRATORY SYNDROME CORONAVIRUS 2 (SARS-COV-2) (CORONAVIRUS DISEASE [COVID-19]), AMPLIFIED PROBE TECHNIQUE, MAKING USE OF HIGH THROUGHPUT TECHNOLOGIES AS DESCRIBED BY CMS-2020-01-R: HCPCS

## 2021-01-27 RX ORDER — SULFAMETHOXAZOLE AND TRIMETHOPRIM 800; 160 MG/1; MG/1
1 TABLET ORAL EVERY 12 HOURS
Qty: 10 TAB | Refills: 0 | Status: SHIPPED | OUTPATIENT
Start: 2021-01-27 | End: 2021-02-01

## 2021-01-27 RX ORDER — CODEINE PHOSPHATE/GUAIFENESIN 10-100MG/5
5 LIQUID (ML) ORAL 3 TIMES DAILY PRN
Qty: 120 ML | Refills: 0 | Status: SHIPPED | OUTPATIENT
Start: 2021-01-27 | End: 2021-02-03

## 2021-01-27 ASSESSMENT — ENCOUNTER SYMPTOMS
DIZZINESS: 0
CHILLS: 0
SPUTUM PRODUCTION: 0
FEVER: 0
HEMOPTYSIS: 0
WHEEZING: 0
HEADACHES: 0
MYALGIAS: 0
MUSCULOSKELETAL NEGATIVE: 1
COUGH: 1
ABDOMINAL PAIN: 0
DIARRHEA: 0
SHORTNESS OF BREATH: 0
FLANK PAIN: 0
RHINORRHEA: 0
NAUSEA: 0
SORE THROAT: 0
VOMITING: 0

## 2021-01-27 ASSESSMENT — COPD QUESTIONNAIRES: COPD: 0

## 2021-01-28 LAB
SARS-COV-2 RNA RESP QL NAA+PROBE: NOTDETECTED
SPECIMEN SOURCE: NORMAL

## 2021-01-28 NOTE — PROGRESS NOTES
"Subjective:      Sandy Yang is a 25 y.o. female who presents with Cough (x 9 days with congestion.  Denies fever or chills. )            Cough  This is a new problem. The current episode started 1 to 4 weeks ago (9 days). The problem has been unchanged. The problem occurs every few minutes. Associated symptoms include nasal congestion. Pertinent negatives include no chest pain, chills, ear pain, fever, headaches, hemoptysis, myalgias, postnasal drip, rash, rhinorrhea, sore throat, shortness of breath or wheezing. The symptoms are aggravated by lying down. She has tried OTC cough suppressant for the symptoms. The treatment provided mild relief. There is no history of asthma, COPD or pneumonia.     Patient presents to urgent care reporting a 9 day history of somewhat productive cough with nasal congestion. She also reports dysuria, frequency, and urgency over the past few days. No fevers, chills, body aches, chest pain, SOB, abdominal pain, flank pain, or history of kidney stones.       Review of Systems   Constitutional: Negative for chills and fever.   HENT: Positive for congestion. Negative for ear pain, postnasal drip, rhinorrhea and sore throat.    Respiratory: Positive for cough. Negative for hemoptysis, sputum production, shortness of breath and wheezing.    Cardiovascular: Negative for chest pain.   Gastrointestinal: Negative for abdominal pain, diarrhea, nausea and vomiting.   Genitourinary: Positive for dysuria, frequency and urgency. Negative for flank pain and hematuria.   Musculoskeletal: Negative.  Negative for myalgias.   Skin: Negative for rash.   Neurological: Negative for dizziness and headaches.        Objective:     /82   Pulse 89   Temp 36.3 °C (97.4 °F) (Temporal)   Resp 14   Ht 1.664 m (5' 5.5\")   Wt 66.7 kg (147 lb)   LMP  (LMP Unknown)   SpO2 99%   BMI 24.09 kg/m²        Physical Exam  Vitals signs and nursing note reviewed.   Constitutional:       General: She is " not in acute distress.     Appearance: Normal appearance. She is well-developed. She is not ill-appearing or diaphoretic.   HENT:      Head: Normocephalic and atraumatic.      Right Ear: Tympanic membrane, ear canal and external ear normal. There is no impacted cerumen.      Left Ear: Tympanic membrane, ear canal and external ear normal.   Eyes:      Conjunctiva/sclera: Conjunctivae normal.   Neck:      Musculoskeletal: Normal range of motion.   Cardiovascular:      Rate and Rhythm: Normal rate and regular rhythm.      Heart sounds: Normal heart sounds. No murmur.   Pulmonary:      Effort: Pulmonary effort is normal.      Breath sounds: Normal breath sounds. No wheezing or rales.      Comments: Dry cough present throughout exam  Abdominal:      General: Abdomen is flat. Bowel sounds are normal. There is no distension.      Tenderness: There is no abdominal tenderness. There is no right CVA tenderness, left CVA tenderness or guarding.   Musculoskeletal: Normal range of motion.   Skin:     General: Skin is warm and dry.   Neurological:      Mental Status: She is alert and oriented to person, place, and time.   Psychiatric:         Behavior: Behavior normal.            PMH:  has a past medical history of Cancer (HCC), Panic anxiety syndrome (7/8/2013), and PUD (peptic ulcer disease).  MEDS:   Current Outpatient Medications:   •  sulfamethoxazole-trimethoprim (BACTRIM DS) 800-160 MG tablet, Take 1 Tab by mouth every 12 hours for 5 days., Disp: 10 Tab, Rfl: 0  •  guaifenesin-codeine (TUSSI-ORGANIDIN NR) 100-10 MG/5ML syrup, Take 5 mL by mouth 3 times a day as needed for up to 7 days., Disp: 120 mL, Rfl: 0  •  nitrofurantoin (MACROBID) 100 MG Cap, Take 1 Cap by mouth 2 times a day for 5 days., Disp: 10 Cap, Rfl: 0  •  fluconazole (DIFLUCAN) 150 MG tablet, Take 1 Tab by mouth every day., Disp: 1 Tab, Rfl: 0  •  metroNIDAZOLE (FLAGYL) 500 MG Tab, , Disp: , Rfl:   ALLERGIES:   Allergies   Allergen Reactions   • Lidocaine     • Morphine Rash     Generalized itching and redness.    • Novocain      Used during dental surgery; had no affect toward deadening her pain   • Rocephin [Ceftriaxone] Rash     Itching     • Zoloft Shortness of Breath     Panic      SURGHX:   Past Surgical History:   Procedure Laterality Date   • REPEAT C SECTION  7/18/2017    Procedure: REPEAT C SECTION;  Surgeon: Olive Valle M.D.;  Location: LABOR AND DELIVERY;  Service:    • BREAST BIOPSY  2/19/2014    Performed by Reid Adorno M.D. at SURGERY SAME DAY [facility] ORS   • PRIMARY C SECTION  8/9/12    for breech   • GASTROSCOPY  12/23/2009    Performed by SUSANA MUÑOZ at SURGERY [facility] ORS     SOCHX:  reports that she has been smoking cigarettes. She has a 2.00 pack-year smoking history. She has never used smokeless tobacco. She reports previous alcohol use. She reports previous drug use. Drug: Marijuana.  FH: family history includes Cancer in her father and paternal grandmother; Diabetes in her maternal grandfather; Heart Disease in her maternal grandfather and maternal uncle.    POCT Urinalysis:  Ref Range & Units  5:16 PM    POC Color Negative Yellow    POC Appearance Negative Slightly Cloudy    POC Leukocyte Esterase Negative Small    POC Nitrites Negative Negative    POC Urobiligen Negative (0.2) mg/dL 0.2    POC Protein Negative mg/dL Negative    POC Urine PH 5.0 - 8.0 6.5    POC Blood Negative Negative    POC Specific Gravity <1.005 - >1.030 1.020    POC Ketones Negative mg/dL Negative    POC Bilirubin Negative mg/dL Negative    POC Glucose Negative mg/dL Negative        Assessment/Plan:        1. Upper respiratory tract infection, unspecified type    - SARS-CoV-2 PCR (24 hour In-House): Collect NP swab in VTM; Future  - guaifenesin-codeine (TUSSI-ORGANIDIN NR) 100-10 MG/5ML syrup; Take 5 mL by mouth 3 times a day as needed for up to 7 days.  Dispense: 120 mL; Refill: 0   - Will cause sedation, avoid driving, operating heavy machinery, and  drinking alcohol    Advised patient symptoms are most likely viral in etiology. Increased fluids and rest. Discussed use of OTC cough and cold medication and Tylenol/Motrin for symptomatic relief.  Return for reevaluation or follow-up with PCP if symptoms persist or worsen. Supportive care, differential diagnoses, and indications for immediate follow-up discussed with patient.   Pathogenesis of diagnosis discussed including typical length and natural progression.  The patient demonstrated a good understanding and agreed with the treatment plan and has no further questions regarding care.   Vital signs stable, patient in no acute respiratory distress. Patient instructed to self-isolate/quarantine. Discharge handout given.      Discussed with patient at length importance of communal effort to help decrease the infection rate of COVID 19. Patient advised to avoid large gatherings of people and practice good hand hygiene and respiratory precautions.       This patient is evaluated under [facility] isolation protocols in urgent care.  Out of an abundance of caution I am wearing a N95 mask, protective eye gear, gloves and gown through all interaction with patient.      2. Acute cystitis without hematuria    - POCT Urinalysis --> + leuks, otherwise normal   - POCT Pregnancy --> negative   - URINE CULTURE(NEW); Future  - sulfamethoxazole-trimethoprim (BACTRIM DS) 800-160 MG tablet; Take 1 Tab by mouth every 12 hours for 5 days.  Dispense: 10 Tab; Refill: 0   - Complete full course of antibiotics as prescribed     - Pt educated on preventative measures for avoiding future UTIs  - Advised to increase fluid intake  - OTC Pyridium (Azo) for symptomatic relief, advised that it will turn urine orange in color  - Pending urine culture  - ER precautions given regarding pyelonephritis including fevers greater than 101 and, vomiting and dehydration, increased back pain.

## 2021-04-20 ENCOUNTER — HOSPITAL ENCOUNTER (OUTPATIENT)
Facility: MEDICAL CENTER | Age: 26
End: 2021-04-20
Attending: PHYSICIAN ASSISTANT
Payer: COMMERCIAL

## 2021-04-20 ENCOUNTER — OFFICE VISIT (OUTPATIENT)
Dept: URGENT CARE | Facility: CLINIC | Age: 26
End: 2021-04-20
Payer: COMMERCIAL

## 2021-04-20 VITALS
WEIGHT: 145 LBS | BODY MASS INDEX: 24.16 KG/M2 | OXYGEN SATURATION: 97 % | DIASTOLIC BLOOD PRESSURE: 78 MMHG | RESPIRATION RATE: 14 BRPM | HEART RATE: 95 BPM | HEIGHT: 65 IN | TEMPERATURE: 98.6 F | SYSTOLIC BLOOD PRESSURE: 112 MMHG

## 2021-04-20 DIAGNOSIS — N30.01 ACUTE CYSTITIS WITH HEMATURIA: ICD-10-CM

## 2021-04-20 DIAGNOSIS — N89.8 VAGINAL DISCHARGE: ICD-10-CM

## 2021-04-20 LAB
APPEARANCE UR: NORMAL
BILIRUB UR STRIP-MCNC: NEGATIVE MG/DL
COLOR UR AUTO: NORMAL
GLUCOSE UR STRIP.AUTO-MCNC: NEGATIVE MG/DL
INT CON NEG: NEGATIVE
INT CON POS: POSITIVE
KETONES UR STRIP.AUTO-MCNC: NEGATIVE MG/DL
LEUKOCYTE ESTERASE UR QL STRIP.AUTO: NORMAL
NITRITE UR QL STRIP.AUTO: NEGATIVE
PH UR STRIP.AUTO: 6.5 [PH] (ref 5–8)
POC URINE PREGNANCY TEST: NEGATIVE
PROT UR QL STRIP: NEGATIVE MG/DL
RBC UR QL AUTO: NEGATIVE
SP GR UR STRIP.AUTO: 1.02
UROBILINOGEN UR STRIP-MCNC: 0.2 MG/DL

## 2021-04-20 PROCEDURE — 99214 OFFICE O/P EST MOD 30 MIN: CPT | Performed by: PHYSICIAN ASSISTANT

## 2021-04-20 PROCEDURE — 87591 N.GONORRHOEAE DNA AMP PROB: CPT

## 2021-04-20 PROCEDURE — 87480 CANDIDA DNA DIR PROBE: CPT

## 2021-04-20 PROCEDURE — 87491 CHLMYD TRACH DNA AMP PROBE: CPT

## 2021-04-20 PROCEDURE — 87660 TRICHOMONAS VAGIN DIR PROBE: CPT

## 2021-04-20 PROCEDURE — 81025 URINE PREGNANCY TEST: CPT | Performed by: PHYSICIAN ASSISTANT

## 2021-04-20 PROCEDURE — 87086 URINE CULTURE/COLONY COUNT: CPT

## 2021-04-20 PROCEDURE — 81002 URINALYSIS NONAUTO W/O SCOPE: CPT | Performed by: PHYSICIAN ASSISTANT

## 2021-04-20 PROCEDURE — 87510 GARDNER VAG DNA DIR PROBE: CPT

## 2021-04-20 RX ORDER — NITROFURANTOIN 25; 75 MG/1; MG/1
100 CAPSULE ORAL 2 TIMES DAILY
Qty: 10 CAPSULE | Refills: 0 | Status: SHIPPED | OUTPATIENT
Start: 2021-04-20 | End: 2021-04-20 | Stop reason: SDUPTHER

## 2021-04-20 RX ORDER — NITROFURANTOIN 25; 75 MG/1; MG/1
100 CAPSULE ORAL 2 TIMES DAILY
Qty: 10 CAPSULE | Refills: 0 | Status: SHIPPED | OUTPATIENT
Start: 2021-04-20 | End: 2021-04-25

## 2021-04-20 ASSESSMENT — ENCOUNTER SYMPTOMS
MUSCULOSKELETAL NEGATIVE: 1
FLANK PAIN: 0
DIARRHEA: 0
VOMITING: 0
DIZZINESS: 0
CHILLS: 0
ABDOMINAL PAIN: 0
NAUSEA: 0
FEVER: 0
SHORTNESS OF BREATH: 0

## 2021-04-20 NOTE — PROGRESS NOTES
"Subjective:      Sandy Ynag is a 25 y.o. female who presents with UTI (x 4 days pain with urination, frequency, pain with urination. HX of UTI's. Would like to be tested for BV. )            Dysuria   This is a new problem. The current episode started in the past 7 days (4 days). The problem occurs intermittently. The quality of the pain is described as burning. The pain is mild. She is sexually active. There is no history of pyelonephritis. Associated symptoms include a discharge, frequency and urgency. Pertinent negatives include no chills, flank pain, hematuria, nausea, possible pregnancy or vomiting. She has tried nothing for the symptoms. There is no history of kidney stones, recurrent UTIs or a urological procedure.     Patient presents to urgent care reporting a 4 day history of dysuria, frequency, and urgency. She also reports recent \"fishy\" smelling vaginal discharge that is a whitish color. No genital rashes or genital itching. She's had BV in the past and this feels similar. No concern for STD exposure, although she would like to be screened at today's visit.       Review of Systems   Constitutional: Negative for chills and fever.   HENT: Negative for congestion.    Respiratory: Negative for shortness of breath.    Cardiovascular: Negative for chest pain.   Gastrointestinal: Negative for abdominal pain, diarrhea, nausea and vomiting.   Genitourinary: Positive for dysuria, frequency and urgency. Negative for flank pain and hematuria.        + vaginal discharge    Musculoskeletal: Negative.    Skin: Negative for rash.   Neurological: Negative for dizziness.        Objective:     /78   Pulse 95   Temp 37 °C (98.6 °F) (Temporal)   Resp 14   Ht 1.651 m (5' 5\")   Wt 65.8 kg (145 lb)   LMP  (LMP Unknown)   SpO2 97%   BMI 24.13 kg/m²      Physical Exam  Vitals and nursing note reviewed.   Constitutional:       General: She is not in acute distress.     Appearance: Normal appearance. " She is well-developed. She is not diaphoretic.   HENT:      Head: Normocephalic and atraumatic.      Right Ear: External ear normal.      Left Ear: External ear normal.   Eyes:      Conjunctiva/sclera: Conjunctivae normal.   Cardiovascular:      Rate and Rhythm: Normal rate.   Pulmonary:      Effort: Pulmonary effort is normal.   Abdominal:      General: Abdomen is flat. Bowel sounds are normal. There is no distension.      Tenderness: There is no abdominal tenderness. There is no right CVA tenderness, left CVA tenderness or guarding.   Genitourinary:     Comments: Exam deferred   Musculoskeletal:         General: Normal range of motion.      Cervical back: Normal range of motion.   Skin:     General: Skin is warm and dry.      Coloration: Skin is pale.   Neurological:      Mental Status: She is alert and oriented to person, place, and time.   Psychiatric:         Behavior: Behavior normal.          PMH:  has a past medical history of Cancer (HCC), Panic anxiety syndrome (7/8/2013), and PUD (peptic ulcer disease).  MEDS:   Current Outpatient Medications:   •  nitrofurantoin (MACROBID) 100 MG Cap, Take 1 capsule by mouth 2 times a day for 5 days., Disp: 10 capsule, Rfl: 0  •  fluconazole (DIFLUCAN) 150 MG tablet, Take 1 Tab by mouth every day. (Patient not taking: Reported on 4/20/2021), Disp: 1 Tab, Rfl: 0  •  metroNIDAZOLE (FLAGYL) 500 MG Tab, , Disp: , Rfl:   ALLERGIES:   Allergies   Allergen Reactions   • Lidocaine    • Morphine Rash     Generalized itching and redness.    • Novocain      Used during dental surgery; had no affect toward deadening her pain   • Rocephin [Ceftriaxone] Rash     Itching     • Zoloft Shortness of Breath     Panic      SURGHX:   Past Surgical History:   Procedure Laterality Date   • REPEAT C SECTION  7/18/2017    Procedure: REPEAT C SECTION;  Surgeon: Olive Valle M.D.;  Location: LABOR AND DELIVERY;  Service:    • BREAST BIOPSY  2/19/2014    Performed by Reid Adorno M.D. at  SURGERY SAME DAY [facility] ORS   • PRIMARY C SECTION  8/9/12    for breech   • GASTROSCOPY  12/23/2009    Performed by SUSANA MUÑOZ at SURGERY [facility] ORS     SOCHX:  reports that she has been smoking cigarettes. She has a 2.00 pack-year smoking history. She has never used smokeless tobacco. She reports previous alcohol use. She reports previous drug use. Drug: Marijuana.  FH: family history includes Cancer in her father and paternal grandmother; Diabetes in her maternal grandfather; Heart Disease in her maternal grandfather and maternal uncle.    POCT Urinalysis:  Ref Range & Units  3:39 PM    POC Color Negative Lt yellow    POC Appearance Negative Sl Cloudy    POC Leukocyte Esterase Negative Small    POC Nitrites Negative Negative    POC Urobiligen Negative (0.2) mg/dL 0.2    POC Protein Negative mg/dL Negative    POC Urine PH 5.0 - 8.0 6.5    POC Blood Negative Negative    POC Specific Gravity <1.005 - >1.030 1.025    POC Ketones Negative mg/dL Negative    POC Bilirubin Negative mg/dL Negative    POC Glucose Negative mg/dL Negative        Assessment/Plan:        1. Acute cystitis with hematuria    - POCT Urinalysis --> + leuks, otherwise normal   - POCT Pregnancy --> negative   - URINE CULTURE(NEW); Future  - nitrofurantoin (MACROBID) 100 MG Cap; Take 1 capsule by mouth 2 times a day for 5 days.  Dispense: 10 capsule; Refill: 0   - Complete full course of antibiotics as prescribed       - Pt educated on preventative measures for avoiding future UTIs  - Advised to increase fluid intake  - OTC Pyridium (Azo) for symptomatic relief, advised that it will turn urine orange in color  - Pending urine culture  - ER precautions given regarding pyelonephritis including fevers greater than 101 and, vomiting and dehydration, increased back pain.      2. Vaginal discharge    - VAGINAL PATHOGENS DNA PANEL; Future  - CHLAMYDIA/GC PCR URINE OR SWAB; Future      Patient declined pelvic exam and opted to self swab for  vaginal pathogens and GC/chlamydia, pending results.

## 2021-04-21 DIAGNOSIS — B96.89 BACTERIAL VAGINOSIS: ICD-10-CM

## 2021-04-21 DIAGNOSIS — N76.0 BACTERIAL VAGINOSIS: ICD-10-CM

## 2021-04-21 RX ORDER — METRONIDAZOLE 500 MG/1
500 TABLET ORAL 2 TIMES DAILY
Qty: 14 TABLET | Refills: 0 | Status: SHIPPED | OUTPATIENT
Start: 2021-04-21 | End: 2021-04-28

## 2021-04-23 LAB
BACTERIA UR CULT: NORMAL
SIGNIFICANT IND 70042: NORMAL
SITE SITE: NORMAL
SOURCE SOURCE: NORMAL

## 2021-08-05 ENCOUNTER — HOSPITAL ENCOUNTER (OUTPATIENT)
Facility: MEDICAL CENTER | Age: 26
End: 2021-08-05
Attending: INTERNAL MEDICINE
Payer: COMMERCIAL

## 2021-08-05 ENCOUNTER — OFFICE VISIT (OUTPATIENT)
Dept: MEDICAL GROUP | Facility: MEDICAL CENTER | Age: 26
End: 2021-08-05
Attending: INTERNAL MEDICINE
Payer: COMMERCIAL

## 2021-08-05 VITALS
HEIGHT: 65 IN | TEMPERATURE: 97.8 F | WEIGHT: 143 LBS | RESPIRATION RATE: 16 BRPM | OXYGEN SATURATION: 96 % | BODY MASS INDEX: 23.82 KG/M2 | HEART RATE: 82 BPM | SYSTOLIC BLOOD PRESSURE: 110 MMHG | DIASTOLIC BLOOD PRESSURE: 70 MMHG

## 2021-08-05 DIAGNOSIS — N89.8 VAGINAL DISCHARGE: ICD-10-CM

## 2021-08-05 DIAGNOSIS — Z12.4 SCREENING FOR MALIGNANT NEOPLASM OF CERVIX: ICD-10-CM

## 2021-08-05 PROCEDURE — 99212 OFFICE O/P EST SF 10 MIN: CPT | Performed by: INTERNAL MEDICINE

## 2021-08-05 PROCEDURE — 87480 CANDIDA DNA DIR PROBE: CPT

## 2021-08-05 PROCEDURE — 87491 CHLMYD TRACH DNA AMP PROBE: CPT

## 2021-08-05 PROCEDURE — 87660 TRICHOMONAS VAGIN DIR PROBE: CPT

## 2021-08-05 PROCEDURE — 88175 CYTOPATH C/V AUTO FLUID REDO: CPT

## 2021-08-05 PROCEDURE — 99213 OFFICE O/P EST LOW 20 MIN: CPT | Mod: 25 | Performed by: INTERNAL MEDICINE

## 2021-08-05 PROCEDURE — 87510 GARDNER VAG DNA DIR PROBE: CPT

## 2021-08-05 PROCEDURE — 87591 N.GONORRHOEAE DNA AMP PROB: CPT

## 2021-08-05 ASSESSMENT — PAIN SCALES - GENERAL: PAINLEVEL: NO PAIN

## 2021-08-06 DIAGNOSIS — N76.0 BACTERIAL VAGINOSIS: ICD-10-CM

## 2021-08-06 DIAGNOSIS — B96.89 BACTERIAL VAGINOSIS: ICD-10-CM

## 2021-08-06 DIAGNOSIS — N89.8 VAGINAL DISCHARGE: ICD-10-CM

## 2021-08-06 DIAGNOSIS — Z12.4 SCREENING FOR MALIGNANT NEOPLASM OF CERVIX: ICD-10-CM

## 2021-08-06 RX ORDER — METRONIDAZOLE 500 MG/1
500 TABLET ORAL 2 TIMES DAILY
Qty: 14 TABLET | Refills: 0 | Status: SHIPPED | OUTPATIENT
Start: 2021-08-06 | End: 2021-08-13

## 2021-08-06 NOTE — ASSESSMENT & PLAN NOTE
She reports persistent vaginal discharge which usually starts after some cramping.  With her IUD she no longer gets her menstrual cycle.  She has a history of recurrent bacterial vaginosis.

## 2021-08-06 NOTE — ASSESSMENT & PLAN NOTE
She presents today for Pap.  Her last was done in January however sample was insufficient with scant cellularity and excessive lubricants.  She currently complains of vaginal discharge as discussed.  She denies pelvic pain, dyspareunia, hematuria, dysuria.  She is sexually active with one male partner who is currently asymptomatic.  Has an IUD in place.

## 2021-08-06 NOTE — PROGRESS NOTES
Subjective:   Sandy Yang is a 25 y.o. female here today for PAP, vaginal discharge    Screening for malignant neoplasm of cervix  She presents today for Pap.  Her last was done in January however sample was insufficient with scant cellularity and excessive lubricants.  She currently complains of vaginal discharge as discussed.  She denies pelvic pain, dyspareunia, hematuria, dysuria.  She is sexually active with one male partner who is currently asymptomatic.  Has an IUD in place.    Vaginal discharge  She reports persistent vaginal discharge which usually starts after some cramping.  With her IUD she no longer gets her menstrual cycle.  She has a history of recurrent bacterial vaginosis.       Current medicines (including changes today)  No current outpatient medications on file.     No current facility-administered medications for this visit.     She  has a past medical history of Cancer (HCC), Panic anxiety syndrome (7/8/2013), and PUD (peptic ulcer disease).    ROS   Denies chest pain, shortness of breath  As above in HPI     Objective:     Vitals:    08/05/21 1708   BP: 110/70   Pulse: 82   Resp: 16   Temp: 36.6 °C (97.8 °F)   SpO2: 96%     Body mass index is 23.8 kg/m².   Physical Exam:  Constitutional: Alert, no distress.  Skin: Warm, dry, good turgor, no rashes in visible areas.  : external genitalia normal, IUD strings visible, cervix slightly friable, mild amount of thin white discharge  no cervical motion tenderness        Assessment and Plan:   The following treatment plan was discussed    1. Screening for malignant neoplasm of cervix  - THINPREP RFLX HPV ASCUS W/CTNG; Future    2. Vaginal discharge  - VAGINAL PATHOGENS DNA PANEL; Future  - THINPREP RFLX HPV ASCUS W/CTNG; Future        Followup: Return if symptoms worsen or fail to improve.

## 2021-08-17 ENCOUNTER — PATIENT MESSAGE (OUTPATIENT)
Dept: MEDICAL GROUP | Facility: MEDICAL CENTER | Age: 26
End: 2021-08-17

## 2021-08-17 DIAGNOSIS — F33.0 MAJOR DEPRESSIVE DISORDER, RECURRENT, MILD (HCC): ICD-10-CM

## 2021-08-17 NOTE — PATIENT COMMUNICATION
Received request via: Pharmacy    Was the patient seen in the last year in this department? Yes    Does the patient have an active prescription (recently filled or refills available) for medication(s) requested? No   No apt scheduled at this time

## 2021-08-18 RX ORDER — FLUOXETINE HYDROCHLORIDE 20 MG/1
CAPSULE ORAL
Qty: 60 CAPSULE | Refills: 1 | Status: SHIPPED | OUTPATIENT
Start: 2021-08-18 | End: 2022-01-12

## 2021-08-22 ENCOUNTER — APPOINTMENT (OUTPATIENT)
Dept: RADIOLOGY | Facility: MEDICAL CENTER | Age: 26
End: 2021-08-22
Attending: EMERGENCY MEDICINE
Payer: COMMERCIAL

## 2021-08-22 ENCOUNTER — OFFICE VISIT (OUTPATIENT)
Dept: URGENT CARE | Facility: CLINIC | Age: 26
End: 2021-08-22
Payer: COMMERCIAL

## 2021-08-22 ENCOUNTER — HOSPITAL ENCOUNTER (EMERGENCY)
Facility: MEDICAL CENTER | Age: 26
End: 2021-08-22
Attending: EMERGENCY MEDICINE
Payer: COMMERCIAL

## 2021-08-22 VITALS
BODY MASS INDEX: 22.81 KG/M2 | HEIGHT: 65 IN | OXYGEN SATURATION: 95 % | TEMPERATURE: 98.1 F | DIASTOLIC BLOOD PRESSURE: 86 MMHG | RESPIRATION RATE: 16 BRPM | WEIGHT: 136.91 LBS | HEART RATE: 86 BPM | SYSTOLIC BLOOD PRESSURE: 138 MMHG

## 2021-08-22 VITALS
BODY MASS INDEX: 22.66 KG/M2 | OXYGEN SATURATION: 98 % | SYSTOLIC BLOOD PRESSURE: 118 MMHG | WEIGHT: 136 LBS | DIASTOLIC BLOOD PRESSURE: 88 MMHG | HEART RATE: 81 BPM | HEIGHT: 65 IN | RESPIRATION RATE: 14 BRPM | TEMPERATURE: 97.8 F

## 2021-08-22 DIAGNOSIS — N83.201 CYST OF RIGHT OVARY: ICD-10-CM

## 2021-08-22 DIAGNOSIS — R10.31 RIGHT LOWER QUADRANT ABDOMINAL PAIN: ICD-10-CM

## 2021-08-22 DIAGNOSIS — R11.2 NAUSEA AND VOMITING, INTRACTABILITY OF VOMITING NOT SPECIFIED, UNSPECIFIED VOMITING TYPE: ICD-10-CM

## 2021-08-22 DIAGNOSIS — R23.3 EASY BRUISING: ICD-10-CM

## 2021-08-22 DIAGNOSIS — R63.0 NO APPETITE: ICD-10-CM

## 2021-08-22 DIAGNOSIS — R11.0 NAUSEA: ICD-10-CM

## 2021-08-22 DIAGNOSIS — R10.31 RLQ ABDOMINAL PAIN: ICD-10-CM

## 2021-08-22 LAB
ALBUMIN SERPL BCP-MCNC: 4.9 G/DL (ref 3.2–4.9)
ALBUMIN/GLOB SERPL: 1.6 G/DL
ALP SERPL-CCNC: 56 U/L (ref 30–99)
ALT SERPL-CCNC: 14 U/L (ref 2–50)
ANION GAP SERPL CALC-SCNC: 15 MMOL/L (ref 7–16)
AST SERPL-CCNC: 19 U/L (ref 12–45)
BASOPHILS # BLD AUTO: 0.3 % (ref 0–1.8)
BASOPHILS # BLD: 0.03 K/UL (ref 0–0.12)
BILIRUB SERPL-MCNC: 0.5 MG/DL (ref 0.1–1.5)
BUN SERPL-MCNC: 9 MG/DL (ref 8–22)
CALCIUM SERPL-MCNC: 9.6 MG/DL (ref 8.5–10.5)
CHLORIDE SERPL-SCNC: 102 MMOL/L (ref 96–112)
CO2 SERPL-SCNC: 22 MMOL/L (ref 20–33)
CREAT SERPL-MCNC: 0.87 MG/DL (ref 0.5–1.4)
EOSINOPHIL # BLD AUTO: 0.02 K/UL (ref 0–0.51)
EOSINOPHIL NFR BLD: 0.2 % (ref 0–6.9)
ERYTHROCYTE [DISTWIDTH] IN BLOOD BY AUTOMATED COUNT: 43.8 FL (ref 35.9–50)
GLOBULIN SER CALC-MCNC: 3 G/DL (ref 1.9–3.5)
GLUCOSE SERPL-MCNC: 90 MG/DL (ref 65–99)
HCG SERPL QL: NEGATIVE
HCT VFR BLD AUTO: 45.9 % (ref 37–47)
HGB BLD-MCNC: 15.5 G/DL (ref 12–16)
IMM GRANULOCYTES # BLD AUTO: 0.03 K/UL (ref 0–0.11)
IMM GRANULOCYTES NFR BLD AUTO: 0.3 % (ref 0–0.9)
LIPASE SERPL-CCNC: 24 U/L (ref 11–82)
LYMPHOCYTES # BLD AUTO: 2.63 K/UL (ref 1–4.8)
LYMPHOCYTES NFR BLD: 27.8 % (ref 22–41)
MCH RBC QN AUTO: 33.5 PG (ref 27–33)
MCHC RBC AUTO-ENTMCNC: 33.8 G/DL (ref 33.6–35)
MCV RBC AUTO: 99.1 FL (ref 81.4–97.8)
MONOCYTES # BLD AUTO: 0.52 K/UL (ref 0–0.85)
MONOCYTES NFR BLD AUTO: 5.5 % (ref 0–13.4)
NEUTROPHILS # BLD AUTO: 6.22 K/UL (ref 2–7.15)
NEUTROPHILS NFR BLD: 65.9 % (ref 44–72)
NRBC # BLD AUTO: 0 K/UL
NRBC BLD-RTO: 0 /100 WBC
PLATELET # BLD AUTO: 277 K/UL (ref 164–446)
PMV BLD AUTO: 10.4 FL (ref 9–12.9)
POTASSIUM SERPL-SCNC: 3.5 MMOL/L (ref 3.6–5.5)
PROT SERPL-MCNC: 7.9 G/DL (ref 6–8.2)
RBC # BLD AUTO: 4.63 M/UL (ref 4.2–5.4)
SODIUM SERPL-SCNC: 139 MMOL/L (ref 135–145)
WBC # BLD AUTO: 9.5 K/UL (ref 4.8–10.8)

## 2021-08-22 PROCEDURE — 83690 ASSAY OF LIPASE: CPT

## 2021-08-22 PROCEDURE — 74177 CT ABD & PELVIS W/CONTRAST: CPT

## 2021-08-22 PROCEDURE — 80053 COMPREHEN METABOLIC PANEL: CPT

## 2021-08-22 PROCEDURE — 85025 COMPLETE CBC W/AUTO DIFF WBC: CPT

## 2021-08-22 PROCEDURE — 81003 URINALYSIS AUTO W/O SCOPE: CPT

## 2021-08-22 PROCEDURE — 700117 HCHG RX CONTRAST REV CODE 255: Performed by: EMERGENCY MEDICINE

## 2021-08-22 PROCEDURE — 84703 CHORIONIC GONADOTROPIN ASSAY: CPT

## 2021-08-22 PROCEDURE — 99283 EMERGENCY DEPT VISIT LOW MDM: CPT

## 2021-08-22 PROCEDURE — 99214 OFFICE O/P EST MOD 30 MIN: CPT | Performed by: FAMILY MEDICINE

## 2021-08-22 RX ORDER — ONDANSETRON 4 MG/1
4 TABLET, ORALLY DISINTEGRATING ORAL ONCE
Status: COMPLETED | OUTPATIENT
Start: 2021-08-22 | End: 2021-08-22

## 2021-08-22 RX ORDER — ONDANSETRON 2 MG/ML
4 INJECTION INTRAMUSCULAR; INTRAVENOUS ONCE
Status: DISCONTINUED | OUTPATIENT
Start: 2021-08-22 | End: 2021-08-22 | Stop reason: HOSPADM

## 2021-08-22 RX ORDER — SODIUM CHLORIDE 9 MG/ML
1000 INJECTION, SOLUTION INTRAVENOUS ONCE
Status: DISCONTINUED | OUTPATIENT
Start: 2021-08-22 | End: 2021-08-22 | Stop reason: HOSPADM

## 2021-08-22 RX ORDER — ONDANSETRON 4 MG/1
4 TABLET, ORALLY DISINTEGRATING ORAL EVERY 8 HOURS PRN
Qty: 10 TABLET | Refills: 0 | Status: SHIPPED | OUTPATIENT
Start: 2021-08-22 | End: 2022-01-12

## 2021-08-22 RX ADMIN — ONDANSETRON 4 MG: 4 TABLET, ORALLY DISINTEGRATING ORAL at 15:53

## 2021-08-22 RX ADMIN — IOHEXOL 100 ML: 350 INJECTION, SOLUTION INTRAVENOUS at 18:57

## 2021-08-22 NOTE — ED TRIAGE NOTES
"Sandy Yang  25 y.o. female  Chief Complaint   Patient presents with   • Sent from Urgent Care     for RLQ tenderness to palp   • N/V     x 2 days; unable to keep down PO intake   • Bleeding/Bruising     pt noticed diffused bruises last couple days       Patient ambulatory to triage with a steady gait for above complaint.     Patient is alert and oriented, speaking in full sentences, following commands, and responding appropriately to questions. Educated on triage process and instructed patient to alert staff to any changes in condition or worsening symptoms.     /86   Pulse 86   Temp 36.7 °C (98.1 °F) (Temporal)   Resp 16   Ht 1.651 m (5' 5\")   Wt 62.1 kg (136 lb 14.5 oz)   SpO2 95%   Breastfeeding No   BMI 22.78 kg/m²       "

## 2021-08-22 NOTE — PROGRESS NOTES
"Subjective     Sandy Yang is a 25 y.o. female who presents with Nausea/Vomiting/Diarrhea (x 3 days - palpitations, loss of apetite, tingling on right leg)            25-year-old otherwise healthy presenting for evaluation of 3-day history of nausea vomiting, significant decrease in appetite, she cannot keep any food down, has noticed also some easy bruising and some lower abdominal discomfort.  She has an IUD.  Denies any history of inflammatory bowels.  Denies any recent oral antibiotic use.  Some loose stool reported but not today.  No melena or hematochezia.  Currently feels nauseous.  Denies any history of pancreatitis.  Pertinent review of system otherwise negative.  No URI symptoms.  She is not vaccinated against Covid      ROS           Objective     /88 (BP Location: Right arm, Patient Position: Sitting, BP Cuff Size: Adult long)   Pulse 81   Temp 36.6 °C (97.8 °F) (Temporal)   Resp 14   Ht 1.651 m (5' 5\")   Wt 61.7 kg (136 lb)   SpO2 98%   BMI 22.63 kg/m²      Physical Exam  Constitutional:       General: She is not in acute distress.     Appearance: She is not ill-appearing, toxic-appearing or diaphoretic.   HENT:      Head: Normocephalic and atraumatic.      Right Ear: External ear normal.   Eyes:      General: No scleral icterus.     Conjunctiva/sclera: Conjunctivae normal.   Cardiovascular:      Rate and Rhythm: Normal rate and regular rhythm.      Heart sounds: No murmur heard.   No friction rub. No gallop.    Pulmonary:      Effort: Pulmonary effort is normal. No respiratory distress.      Breath sounds: No stridor. No wheezing, rhonchi or rales.   Abdominal:      General: There is no distension.      Palpations: Abdomen is soft. There is no mass.      Tenderness: There is abdominal tenderness. There is guarding. There is no rebound.      Hernia: No hernia is present.   Skin:     Coloration: Skin is not jaundiced or pale.      Comments: Small superficial bruise noted in the " right lower extremity above the ankle   Neurological:      Mental Status: She is alert and oriented to person, place, and time.   Psychiatric:         Behavior: Behavior normal.           Assessment & Plan   ASSESSMENT:PLAN:  1. Nausea and vomiting, intractability of vomiting not specified, unspecified vomiting type  - ondansetron (ZOFRAN ODT) dispertab 4 mg    2. No appetite    3. Right lower quadrant abdominal pain    4. Easy bruising    Differential diagnosis appendicitis, ovarian cyst, colitis, gastroenteritis or pancreatitis.  She was given Zofran for nausea.  Transfer to local ED for further evaluation and transfer center notified.

## 2021-08-23 LAB
APPEARANCE UR: CLEAR
BILIRUB UR QL STRIP.AUTO: NEGATIVE
COLOR UR: YELLOW
GLUCOSE UR STRIP.AUTO-MCNC: NEGATIVE MG/DL
KETONES UR STRIP.AUTO-MCNC: 15 MG/DL
LEUKOCYTE ESTERASE UR QL STRIP.AUTO: NEGATIVE
MICRO URNS: ABNORMAL
NITRITE UR QL STRIP.AUTO: NEGATIVE
PH UR STRIP.AUTO: 6.5 [PH] (ref 5–8)
PROT UR QL STRIP: NEGATIVE MG/DL
RBC UR QL AUTO: NEGATIVE
SP GR UR STRIP.AUTO: 1.03
UROBILINOGEN UR STRIP.AUTO-MCNC: 1 MG/DL

## 2021-08-23 NOTE — ED PROVIDER NOTES
ED Provider Note    Scribed for Harriet Smart M.D. by Berenice Singh. 8/22/2021, 5:04 PM.    Primary care provider: Argelia Roblero M.D.  Means of arrival: Walk in  History obtained from: patient   History limited by: none    CHIEF COMPLAINT  Chief Complaint   Patient presents with   • Sent from Urgent Care     for RLQ tenderness to palp   • N/V     x 2 days; unable to keep down PO intake   • Bleeding/Bruising     pt noticed diffused bruises last couple days       HPI  Sandy Yang is a 25 y.o. female who presents to the Emergency Department for nausea onset 3 days ago. She has associated decreased appetite. Patient was seen at urgent care today. She was given medication for nausea however he noted some tenderness in her right lower quadrant and advised her to come to the ED. Denies fevers, diarrhea, or dysuria. She was recently started on Duloxetine 5 days ago for depression.    REVIEW OF SYSTEMS  Pertinent positives include nausea and decreased appetite. Pertinent negatives include no fevers, dysuria, or diarrhea.  All other systems reviewed and negative.    PAST MEDICAL HISTORY   has a past medical history of Cancer (HCC), Panic anxiety syndrome (7/8/2013), and PUD (peptic ulcer disease).    SURGICAL HISTORY   has a past surgical history that includes gastroscopy (12/23/2009); primary c section (8/9/12); breast biopsy (2/19/2014); and repeat c section (7/18/2017).    SOCIAL HISTORY  Social History     Tobacco Use   • Smoking status: Current Every Day Smoker     Packs/day: 0.50     Years: 4.00     Pack years: 2.00     Types: Cigarettes   • Smokeless tobacco: Never Used   • Tobacco comment: 6 cig/day   Vaping Use   • Vaping Use: Never used   Substance Use Topics   • Alcohol use: Not Currently     Alcohol/week: 0.0 oz   • Drug use: Not Currently     Types: Marijuana      Social History     Substance and Sexual Activity   Drug Use Not Currently   • Types: Marijuana       FAMILY HISTORY  Family History  "  Problem Relation Age of Onset   • Cancer Paternal Grandmother         lung cancer   • Heart Disease Maternal Uncle         MI age 45   • Cancer Father         skin   • Diabetes Maternal Grandfather    • Heart Disease Maternal Grandfather        CURRENT MEDICATIONS  Home Medications     Reviewed by Marce Naranjo R.N. (Registered Nurse) on 08/22/21 at 1644  Med List Status: Partial   Medication Last Dose Status   FLUoxetine (PROZAC) 20 MG Cap  Active                ALLERGIES  Allergies   Allergen Reactions   • Lidocaine    • Morphine Rash     Generalized itching and redness.    • Novocain      Used during dental surgery; had no affect toward deadening her pain   • Rocephin [Ceftriaxone] Rash     Itching     • Zoloft Shortness of Breath     Panic        PHYSICAL EXAM  VITAL SIGNS: /86   Pulse 86   Temp 36.7 °C (98.1 °F) (Temporal)   Resp 16   Ht 1.651 m (5' 5\")   Wt 62.1 kg (136 lb 14.5 oz)   SpO2 95%   Breastfeeding No   BMI 22.78 kg/m²   Constitutional: Alert in no apparent distress.  HENT: No signs of trauma, Bilateral external ears normal, Nose normal.   Eyes: Pupils are equal and reactive, Conjunctiva normal, Non-icteric.   Neck: Normal range of motion, No tenderness, Supple, No stridor.   Cardiovascular: Regular rate and rhythm, no murmurs.   Thorax & Lungs: Normal breath sounds, No respiratory distress, No wheezing, No chest tenderness.   Abdomen: Bowel sounds normal, Soft, right lower quadrant tenderness, No masses, No peritoneal signs.  Skin: Warm, Dry, No erythema, No rash.   Musculoskeletal:  No major deformities noted.  Neurologic: Alert, moving all extremities without difficulty, no focal deficits.    LABS  Labs Reviewed   CBC WITH DIFFERENTIAL - Abnormal; Notable for the following components:       Result Value    MCV 99.1 (*)     MCH 33.5 (*)     All other components within normal limits   COMP METABOLIC PANEL - Abnormal; Notable for the following components:    Potassium 3.5 (*)  "    All other components within normal limits   LIPASE   HCG QUAL SERUM   ESTIMATED GFR   URINALYSIS,CULTURE IF INDICATED     All labs reviewed by me.    RADIOLOGY  CT-ABDOMEN-PELVIS WITH   Final Result      1.  Trace nonspecific free fluid in the pelvis.   2.  2.1 cm right ovarian cyst/dominant follicle.   3.  No evidence of appendicitis.        The radiologist's interpretation of all radiological studies have been reviewed by me.    COURSE & MEDICAL DECISION MAKING  Pertinent Labs & Imaging studies reviewed. (See chart for details)    Differential diagnoses include but are not limited to: Appendicitis, medication reaction, ovarian cyst    5:04 PM - Patient seen and examined at bedside. I discussed that the area of the patients tenderness is concerning for appendicitis. We will obtain labs and imaging to further evaluate for a cause of her symptoms. Patient will be treated with Zofran 4 mg and IV fluids. Ordered CT abdomen pelvis, CBC w/ diff, CMP, lipase, HCG qual, and UA to evaluate her symptoms.     7:45 PM - Patient was reevaluated at bedside. Discussed lab and radiology results with the patient and informed them that her CT did not show any sings of appendicitis, however it did show a right sided ovarian cyst which is likely the cause of her tenderness. Prescribed Zofran. Recommended follow up with her PCP if her symptoms persist. Discussed return precautions. Patient will be discharged at this time. She verbalizes agreement with discharge and plan of care.     HYDRATION: Based on the patient's presentation of Other pending NPO status the patient was given IV fluids. IV Hydration was used because oral hydration was not adequate alone. Upon recheck following hydration, the patient was improved.    Decision Making:  This is a 25 y.o. year old female who presents with right lower quadrant abdominal pain nausea.  Patient is well-appearing on exam she does not have a fever she does have some focal right lower  quadrant tenderness.  Labs are obtained and CT was performed.  Labs showed normal white count without left shift.  CMP and lipase were normal.  CT did not show any evidence of acute appendicitis.  She has a small right ovarian cyst.  I do think at this point that her right lower quadrant pain may be secondary to the cyst which should resolve on its own.  She does not have any evidence of appendicitis at this time I do think she can be discharged home.    The patient will return for new or worsening symptoms and is stable at the time of discharge. Patient was given return precautions. Patient and/or family member verbalizes understanding and will comply.    DISPOSITION:  Patient will be discharged home in stable condition.    FOLLOW UP:  Argelia Roblero M.D.  [address] [id number] [phone]    Schedule an appointment as soon as possible for a visit   As needed    [facility], Emergency Dept  [address] [facility] [id number] [phone]    Return for worsening pain, vomiting, fever or other concerns      OUTPATIENT MEDICATIONS:  New Prescriptions    ONDANSETRON (ZOFRAN ODT) 4 MG TABLET DISPERSIBLE    Take 1 Tablet by mouth every 8 hours as needed.       FINAL IMPRESSION  1. Nausea    2. RLQ abdominal pain    3. Cyst of right ovary         This dictation has been created using voice recognition software and/or scribes. The accuracy of the dictation is limited by the abilities of the software and the expertise of the scribes. I expect there may be some errors of grammar and possibly content. I made every attempt to manually correct the errors within my dictation. However, errors related to voice recognition software and/or scribes may still exist and should be interpreted within the appropriate context.     Berenice BRAND (Casey), am scribing for, and in the presence of, Harriet Smart M.D..    Electronically signed by: Berenice Singh (Casey), 8/22/2021    Harriet BRAND  DAISHA Smart. personally performed the services described in this documentation, as scribed by Berenice Singh in my presence, and it is both accurate and complete. C    The note accurately reflects work and decisions made by me.  Harriet Smart M.D.  8/22/2021  10:14 PM

## 2021-09-18 ENCOUNTER — HOSPITAL ENCOUNTER (OUTPATIENT)
Facility: MEDICAL CENTER | Age: 26
End: 2021-09-18
Attending: PHYSICIAN ASSISTANT
Payer: COMMERCIAL

## 2021-09-18 ENCOUNTER — OFFICE VISIT (OUTPATIENT)
Dept: URGENT CARE | Facility: CLINIC | Age: 26
End: 2021-09-18
Payer: COMMERCIAL

## 2021-09-18 VITALS
DIASTOLIC BLOOD PRESSURE: 60 MMHG | HEIGHT: 65 IN | BODY MASS INDEX: 23.43 KG/M2 | RESPIRATION RATE: 16 BRPM | TEMPERATURE: 98.3 F | WEIGHT: 140.6 LBS | OXYGEN SATURATION: 96 % | HEART RATE: 73 BPM | SYSTOLIC BLOOD PRESSURE: 114 MMHG

## 2021-09-18 DIAGNOSIS — Z20.822 SUSPECTED COVID-19 VIRUS INFECTION: ICD-10-CM

## 2021-09-18 DIAGNOSIS — J02.9 SORE THROAT: ICD-10-CM

## 2021-09-18 DIAGNOSIS — R09.81 NASAL CONGESTION: ICD-10-CM

## 2021-09-18 DIAGNOSIS — R51.9 NONINTRACTABLE HEADACHE, UNSPECIFIED CHRONICITY PATTERN, UNSPECIFIED HEADACHE TYPE: ICD-10-CM

## 2021-09-18 DIAGNOSIS — R50.9 FEVER AND CHILLS: ICD-10-CM

## 2021-09-18 PROCEDURE — 99213 OFFICE O/P EST LOW 20 MIN: CPT | Mod: CS | Performed by: PHYSICIAN ASSISTANT

## 2021-09-18 PROCEDURE — U0003 INFECTIOUS AGENT DETECTION BY NUCLEIC ACID (DNA OR RNA); SEVERE ACUTE RESPIRATORY SYNDROME CORONAVIRUS 2 (SARS-COV-2) (CORONAVIRUS DISEASE [COVID-19]), AMPLIFIED PROBE TECHNIQUE, MAKING USE OF HIGH THROUGHPUT TECHNOLOGIES AS DESCRIBED BY CMS-2020-01-R: HCPCS

## 2021-09-18 PROCEDURE — U0005 INFEC AGEN DETEC AMPLI PROBE: HCPCS

## 2021-09-18 ASSESSMENT — FIBROSIS 4 INDEX: FIB4 SCORE: 0.49

## 2021-09-18 ASSESSMENT — ENCOUNTER SYMPTOMS: COUGH: 1

## 2021-09-18 NOTE — PROGRESS NOTES
Subjective     Sandy Yang is a 25 y.o. female who presents with Cough (pt has sore throat, headache, nausea, bodyaches x 4 days )    Medications:    • FLUoxetine Caps  • ondansetron Tbdp    Allergies: Lidocaine, Morphine, Novocain, Rocephin [ceftriaxone], and Zoloft    Problem List: Sandy Yang does not have any pertinent problems on file.    Surgical History:  Past Surgical History:   Procedure Laterality Date   • REPEAT C SECTION  7/18/2017    Procedure: REPEAT C SECTION;  Surgeon: Olive Valle M.D.;  Location: LABOR AND DELIVERY;  Service:    • BREAST BIOPSY  2/19/2014    Performed by Reid Adorno M.D. at SURGERY SAME DAY [facility]S   • PRIMARY C SECTION  8/9/12    for breech   • GASTROSCOPY  12/23/2009    Performed by SUSANA MUÑOZ at SURGERY [facility] ORS       Past Social Hx: Sandy Yang  reports that she has been smoking cigarettes. She has a 2.00 pack-year smoking history. She has never used smokeless tobacco. She reports previous alcohol use. She reports previous drug use. Drug: Marijuana.     Past Family Hx:  Sandy Yang family history includes Cancer in her father and paternal grandmother; Diabetes in her maternal grandfather; Heart Disease in her maternal grandfather and maternal uncle.     Problem list, medications, and allergies reviewed by myself today in Epic.           Patient presents with:  Cough: pt has sore throat, headache, nausea, bodyaches x 4 days. Pt has not been vaccinated against COVID-19. Needs covid test.          Cough  This is a new problem. Associated symptoms include chills, a fever, headaches, myalgias, postnasal drip and a sore throat. The symptoms are aggravated by lying down. She has tried body position changes, OTC cough suppressant and rest for the symptoms. The treatment provided no relief. Her past medical history is significant for bronchitis. There is no history of asthma or pneumonia.       Review of  "Systems   Constitutional: Positive for chills and fever.   HENT: Positive for congestion, postnasal drip and sore throat.    Respiratory: Positive for cough.    Musculoskeletal: Positive for myalgias.   Neurological: Positive for headaches.   All other systems reviewed and are negative.             Objective     /60 (BP Location: Right arm, Patient Position: Standing, BP Cuff Size: Adult)   Pulse 73   Temp 36.8 °C (98.3 °F) (Temporal)   Resp 16   Ht 1.651 m (5' 5\")   Wt 63.8 kg (140 lb 9.6 oz)   SpO2 96%   BMI 23.40 kg/m²      Physical Exam  Vitals and nursing note reviewed.   Constitutional:       General: She is not in acute distress.     Appearance: Normal appearance. She is well-developed and normal weight. She is not ill-appearing or toxic-appearing.   HENT:      Head: Normocephalic and atraumatic.      Right Ear: Tympanic membrane normal.      Left Ear: Tympanic membrane normal.      Nose: Mucosal edema, congestion and rhinorrhea present.      Mouth/Throat:      Mouth: Mucous membranes are moist.      Pharynx: Uvula midline. No posterior oropharyngeal erythema.   Eyes:      Extraocular Movements: Extraocular movements intact.      Conjunctiva/sclera: Conjunctivae normal.      Pupils: Pupils are equal, round, and reactive to light.   Cardiovascular:      Rate and Rhythm: Normal rate and regular rhythm.      Pulses: Normal pulses.      Heart sounds: Normal heart sounds.   Pulmonary:      Effort: Pulmonary effort is normal. No respiratory distress.      Breath sounds: Normal breath sounds. No stridor. No wheezing, rhonchi or rales.   Chest:      Chest wall: No tenderness.   Abdominal:      Palpations: Abdomen is soft.   Musculoskeletal:         General: Normal range of motion.      Cervical back: Normal range of motion and neck supple.   Skin:     General: Skin is warm and dry.      Capillary Refill: Capillary refill takes less than 2 seconds.   Neurological:      General: No focal deficit present. "      Mental Status: She is alert and oriented to person, place, and time.      Gait: Gait normal.   Psychiatric:         Mood and Affect: Mood normal.         Behavior: Behavior is cooperative.                             Assessment & Plan        1. Nasal congestion  COVID/SARS CoV-2 PCR   2. Fever and chills  COVID/SARS CoV-2 PCR   3. Sore throat  COVID/SARS CoV-2 PCR   4. Nonintractable headache, unspecified chronicity pattern, unspecified headache type  COVID/SARS CoV-2 PCR   5. Suspected COVID-19 virus infection  COVID/SARS CoV-2 PCR        Per protocol for PUI/ISO patients, the patient was evaluated by me while I was wearing PPE.  Per CDC guidelines, patient has been instructed to self quarantine at home until test results are known.  IF positive, pt to remain at home for 10 days from onset of symptoms.  If negative, pt to remain at home until fever has resolved.       PT can begin or continue OTC medications, increase fluids and rest until symptoms improve.       PT should follow up with PCP in 1-2 days for re-evaluation if symptoms have not improved.      Discussed red flags and reasons to return to UC or ED.      Pt and/or family verbalized understanding of diagnosis and follow up instructions and was offered informational handout on diagnosis.  PT discharged.

## 2021-09-19 DIAGNOSIS — R50.9 FEVER AND CHILLS: ICD-10-CM

## 2021-09-19 DIAGNOSIS — Z20.822 SUSPECTED COVID-19 VIRUS INFECTION: ICD-10-CM

## 2021-09-19 DIAGNOSIS — R51.9 NONINTRACTABLE HEADACHE, UNSPECIFIED CHRONICITY PATTERN, UNSPECIFIED HEADACHE TYPE: ICD-10-CM

## 2021-09-19 DIAGNOSIS — R09.81 NASAL CONGESTION: ICD-10-CM

## 2021-09-19 DIAGNOSIS — J02.9 SORE THROAT: ICD-10-CM

## 2021-09-26 ASSESSMENT — ENCOUNTER SYMPTOMS
MYALGIAS: 1
CHILLS: 1
SORE THROAT: 1
FEVER: 1
HEADACHES: 1

## 2021-12-21 ENCOUNTER — PATIENT MESSAGE (OUTPATIENT)
Dept: MEDICAL GROUP | Facility: MEDICAL CENTER | Age: 26
End: 2021-12-21

## 2021-12-21 DIAGNOSIS — B37.31 VAGINAL CANDIDIASIS: ICD-10-CM

## 2021-12-21 RX ORDER — FLUCONAZOLE 150 MG/1
150 TABLET ORAL DAILY
Qty: 1 TABLET | Refills: 0 | Status: SHIPPED | OUTPATIENT
Start: 2021-12-21 | End: 2021-12-22

## 2021-12-26 ENCOUNTER — PATIENT MESSAGE (OUTPATIENT)
Dept: MEDICAL GROUP | Facility: MEDICAL CENTER | Age: 26
End: 2021-12-26

## 2021-12-26 DIAGNOSIS — N76.0 BACTERIAL VAGINOSIS: ICD-10-CM

## 2021-12-26 DIAGNOSIS — B96.89 BACTERIAL VAGINOSIS: ICD-10-CM

## 2021-12-28 RX ORDER — METRONIDAZOLE 500 MG/1
500 TABLET ORAL 2 TIMES DAILY
Qty: 14 TABLET | Refills: 0 | Status: SHIPPED | OUTPATIENT
Start: 2021-12-28 | End: 2022-01-04

## 2022-01-12 ENCOUNTER — OFFICE VISIT (OUTPATIENT)
Dept: URGENT CARE | Facility: CLINIC | Age: 27
End: 2022-01-12
Payer: COMMERCIAL

## 2022-01-12 VITALS
BODY MASS INDEX: 27.32 KG/M2 | HEIGHT: 65 IN | DIASTOLIC BLOOD PRESSURE: 88 MMHG | OXYGEN SATURATION: 99 % | TEMPERATURE: 97.9 F | RESPIRATION RATE: 22 BRPM | HEART RATE: 90 BPM | WEIGHT: 164 LBS | SYSTOLIC BLOOD PRESSURE: 138 MMHG

## 2022-01-12 DIAGNOSIS — R11.0 NAUSEA: ICD-10-CM

## 2022-01-12 DIAGNOSIS — U07.1 COVID-19: ICD-10-CM

## 2022-01-12 DIAGNOSIS — R51.9 NONINTRACTABLE HEADACHE, UNSPECIFIED CHRONICITY PATTERN, UNSPECIFIED HEADACHE TYPE: ICD-10-CM

## 2022-01-12 PROCEDURE — 99214 OFFICE O/P EST MOD 30 MIN: CPT | Performed by: PHYSICIAN ASSISTANT

## 2022-01-12 RX ORDER — DEXTROMETHORPHAN HYDROBROMIDE AND PROMETHAZINE HYDROCHLORIDE 15; 6.25 MG/5ML; MG/5ML
5 SYRUP ORAL 4 TIMES DAILY PRN
Qty: 100 ML | Refills: 0 | Status: SHIPPED | OUTPATIENT
Start: 2022-01-12 | End: 2022-01-17

## 2022-01-12 RX ORDER — ONDANSETRON HYDROCHLORIDE 8 MG/1
8 TABLET, FILM COATED ORAL EVERY 8 HOURS PRN
Qty: 15 TABLET | Refills: 0 | Status: SHIPPED | OUTPATIENT
Start: 2022-01-12 | End: 2022-05-20

## 2022-01-12 RX ORDER — KETOROLAC TROMETHAMINE 30 MG/ML
30 INJECTION, SOLUTION INTRAMUSCULAR; INTRAVENOUS ONCE
Status: COMPLETED | OUTPATIENT
Start: 2022-01-12 | End: 2022-01-12

## 2022-01-12 RX ADMIN — KETOROLAC TROMETHAMINE 30 MG: 30 INJECTION, SOLUTION INTRAMUSCULAR; INTRAVENOUS at 18:11

## 2022-01-12 ASSESSMENT — ENCOUNTER SYMPTOMS: SHORTNESS OF BREATH: 1

## 2022-01-12 ASSESSMENT — FIBROSIS 4 INDEX: FIB4 SCORE: 0.51

## 2022-01-13 NOTE — PROGRESS NOTES
"Subjective     Sandy Yang is a 26 y.o. female who presents with Shortness of Breath (Covid +, worse. Headache, SOB, light headed, chest pressure   x 8 days)            Patient is a 26-year-old female who presents to urgent care concern regarding minimal improvement with her COVID-19 symptoms.  Patient began with symptoms approximately 8 days ago of which he did take a PCR test through the [facility].  Patient is not currently vaccinated at this time.  She has remained with continued cough, body aches, headache intermittent shortness of breath and chest tightness.  Patient reports that she had a fever last night with associated body aches and sweats.  She has been taking DayQuil, NyQuil along with Tylenol and Motrin formulations with mild improvement of symptoms.  Patient did report notable shortness of breath last night however denies same sensation today.    Shortness of Breath  This is a new problem. The current episode started in the past 7 days. The problem has been waxing and waning. Associated symptoms include headaches and a sore throat. Pertinent negatives include no leg pain or leg swelling. Treatments tried: Over-the-counter cold formulations.       Review of Systems   Constitutional: Positive for chills and malaise/fatigue.   HENT: Positive for congestion and sore throat.    Eyes: Negative for discharge and redness.   Respiratory: Positive for cough and shortness of breath.    Cardiovascular: Negative for leg swelling.   Musculoskeletal: Positive for myalgias.   Neurological: Positive for headaches.   All other systems reviewed and are negative.             Objective     /88   Pulse 90   Temp 36.6 °C (97.9 °F)   Resp (!) 22   Ht 1.651 m (5' 5\")   Wt 74.4 kg (164 lb)   SpO2 99%   BMI 27.29 kg/m²    PMH:  has a past medical history of Cancer (HCC), Panic anxiety syndrome (7/8/2013), and PUD (peptic ulcer disease).  MEDS: Reviewed .   ALLERGIES:   Allergies   Allergen Reactions   • " Lidocaine    • Morphine Rash     Generalized itching and redness.    • Novocain      Used during dental surgery; had no affect toward deadening her pain   • Rocephin [Ceftriaxone] Rash     Itching     • Zoloft Shortness of Breath     Panic      SURGHX:   Past Surgical History:   Procedure Laterality Date   • REPEAT C SECTION  7/18/2017    Procedure: REPEAT C SECTION;  Surgeon: Olive Valle M.D.;  Location: LABOR AND DELIVERY;  Service:    • BREAST BIOPSY  2/19/2014    Performed by Reid Adorno M.D. at SURGERY SAME DAY [facility] ORS   • PRIMARY C SECTION  8/9/12    for breech   • GASTROSCOPY  12/23/2009    Performed by SUSANA MUÑOZ at SURGERY [facility] ORS     SOCHX:  reports that she has quit smoking. Her smoking use included cigarettes. She has a 2.00 pack-year smoking history. She has never used smokeless tobacco. She reports previous alcohol use. She reports previous drug use. Drug: Marijuana.  FH: Family history was reviewed, no pertinent findings to report    Physical Exam  Vitals reviewed.   Constitutional:       General: She is not in acute distress.     Appearance: She is well-developed. She is ill-appearing.   HENT:      Head: Normocephalic and atraumatic.      Right Ear: External ear normal.      Left Ear: External ear normal.      Nose: Congestion present.      Mouth/Throat:      Pharynx: Posterior oropharyngeal erythema present.   Eyes:      Conjunctiva/sclera: Conjunctivae normal.      Pupils: Pupils are equal, round, and reactive to light.   Neck:      Trachea: No tracheal deviation.   Cardiovascular:      Rate and Rhythm: Normal rate and regular rhythm.   Pulmonary:      Effort: Pulmonary effort is normal.      Breath sounds: Normal breath sounds.   Musculoskeletal:         General: Normal range of motion.      Cervical back: Normal range of motion and neck supple.   Skin:     General: Skin is warm.      Findings: No rash.      Comments: No rash to area exposed during the visit today.     Neurological:      Mental Status: She is alert and oriented to person, place, and time.      Coordination: Coordination normal.   Psychiatric:         Behavior: Behavior normal.         Thought Content: Thought content normal.         Judgment: Judgment normal.                             Assessment & Plan        1. COVID-19  - ketorolac (TORADOL) injection 30 mg  - ondansetron (ZOFRAN) 8 MG Tab; Take 1 Tablet by mouth every 8 hours as needed for Nausea/Vomiting.  Dispense: 15 Tablet; Refill: 0  - promethazine-dextromethorphan (PROMETHAZINE-DM) 6.25-15 MG/5ML syrup; Take 5 mL by mouth 4 times a day as needed for Cough for up to 5 days.  Dispense: 100 mL; Refill: 0    2. Nonintractable headache, unspecified chronicity pattern, unspecified headache type  - ondansetron (ZOFRAN) 8 MG Tab; Take 1 Tablet by mouth every 8 hours as needed for Nausea/Vomiting.  Dispense: 15 Tablet; Refill: 0  - promethazine-dextromethorphan (PROMETHAZINE-DM) 6.25-15 MG/5ML syrup; Take 5 mL by mouth 4 times a day as needed for Cough for up to 5 days.  Dispense: 100 mL; Refill: 0    3. Nausea  - ondansetron (ZOFRAN) 8 MG Tab; Take 1 Tablet by mouth every 8 hours as needed for Nausea/Vomiting.  Dispense: 15 Tablet; Refill: 0  - promethazine-dextromethorphan (PROMETHAZINE-DM) 6.25-15 MG/5ML syrup; Take 5 mL by mouth 4 times a day as needed for Cough for up to 5 days.  Dispense: 100 mL; Refill: 0            Had lengthy discussion with the patient today fortunately the patient on exam today is with lungs that are clear and vitals are stable at this time.  As of yesterday our antiviral storage has been used unfortunately this is not available.  We did discuss monoclonal antibody therapy of which we are not utilizing Regeneron for new strain of COVID-19.  Uncertain protocols of other hospital organizations as patient does report that her mom works for [employer]- encourage patient to reach out to either her PCP or other hospital organizations  if requesting or wanting such outpatient treatment for COVID-19 at this time unfortunately I do not have such readily available at this time.  Patient also is outside window as she is on day 8.  Patient understands and will reach out to other resources at this time.  In the interim we will write for the above to assist with symptomatic relief.    Encourage patient to utilize home pulse oximetry and recent signs and symptoms that would require patient to have emergent follow-up with us.    Appropriate PPE worn at all times by provider.   Pt. Had face mask on throughout entirety of the visit other than oropharyngeal examination today.     Side effects of OTC or prescribed medications discussed.     DDX, Supportive care, and indications for immediate follow-up discussed with patient.    Instructed to return to clinic or nearest emergency department if we are not available for any change in condition, further concerns, or worsening of symptoms.    The patient and/or guardian demonstrated a good understanding and agreed with the treatment plan.    Please note that this dictation was created using voice recognition software. I have made every reasonable attempt to correct obvious errors, but I expect that there are errors of grammar and possibly content that I did not discover before finalizing the note.      1/16- Called and spoke with patient in general patient reporting that she feels profoundly better however she does have a residual headache of which she does note the injection significantly helped for approximately 2 days and then headache returned.  She does report this is worse in the afternoon with associated photophobia and sometimes nausea.  Will write for oral form of ketorolac at this time to assist with symptoms.  She is to avoid concomitant usage of NSAIDs as well.

## 2022-01-16 RX ORDER — KETOROLAC TROMETHAMINE 10 MG/1
10 TABLET, FILM COATED ORAL EVERY 8 HOURS PRN
Qty: 12 TABLET | Refills: 0 | Status: SHIPPED | OUTPATIENT
Start: 2022-01-16 | End: 2022-05-23 | Stop reason: SDUPTHER

## 2022-01-16 ASSESSMENT — ENCOUNTER SYMPTOMS
EYE REDNESS: 0
CHILLS: 1
HEADACHES: 1
SORE THROAT: 1
EYE DISCHARGE: 0
MYALGIAS: 1
COUGH: 1
LEG PAIN: 0

## 2022-03-14 ENCOUNTER — OFFICE VISIT (OUTPATIENT)
Dept: URGENT CARE | Facility: CLINIC | Age: 27
End: 2022-03-14
Payer: COMMERCIAL

## 2022-03-14 ENCOUNTER — HOSPITAL ENCOUNTER (OUTPATIENT)
Facility: MEDICAL CENTER | Age: 27
End: 2022-03-14
Attending: PHYSICIAN ASSISTANT
Payer: COMMERCIAL

## 2022-03-14 VITALS
OXYGEN SATURATION: 98 % | SYSTOLIC BLOOD PRESSURE: 110 MMHG | DIASTOLIC BLOOD PRESSURE: 64 MMHG | RESPIRATION RATE: 14 BRPM | HEIGHT: 65 IN | WEIGHT: 169 LBS | TEMPERATURE: 98.9 F | BODY MASS INDEX: 28.16 KG/M2 | HEART RATE: 86 BPM

## 2022-03-14 DIAGNOSIS — N89.8 VAGINAL ODOR: ICD-10-CM

## 2022-03-14 DIAGNOSIS — N89.8 VAGINAL DISCHARGE: ICD-10-CM

## 2022-03-14 DIAGNOSIS — R30.0 DYSURIA: ICD-10-CM

## 2022-03-14 LAB
APPEARANCE UR: NORMAL
BILIRUB UR STRIP-MCNC: NEGATIVE MG/DL
COLOR UR AUTO: YELLOW
GLUCOSE UR STRIP.AUTO-MCNC: NEGATIVE MG/DL
INT CON NEG: NEGATIVE
INT CON POS: POSITIVE
KETONES UR STRIP.AUTO-MCNC: NEGATIVE MG/DL
LEUKOCYTE ESTERASE UR QL STRIP.AUTO: NORMAL
NITRITE UR QL STRIP.AUTO: NEGATIVE
PH UR STRIP.AUTO: 7 [PH] (ref 5–8)
POC URINE PREGNANCY TEST: NEGATIVE
PROT UR QL STRIP: NEGATIVE MG/DL
RBC UR QL AUTO: NEGATIVE
SP GR UR STRIP.AUTO: 1.02
UROBILINOGEN UR STRIP-MCNC: 0.2 MG/DL

## 2022-03-14 PROCEDURE — 87660 TRICHOMONAS VAGIN DIR PROBE: CPT

## 2022-03-14 PROCEDURE — 99214 OFFICE O/P EST MOD 30 MIN: CPT | Performed by: PHYSICIAN ASSISTANT

## 2022-03-14 PROCEDURE — 87086 URINE CULTURE/COLONY COUNT: CPT

## 2022-03-14 PROCEDURE — 87510 GARDNER VAG DNA DIR PROBE: CPT

## 2022-03-14 PROCEDURE — 87480 CANDIDA DNA DIR PROBE: CPT

## 2022-03-14 PROCEDURE — 87591 N.GONORRHOEAE DNA AMP PROB: CPT

## 2022-03-14 PROCEDURE — 81002 URINALYSIS NONAUTO W/O SCOPE: CPT | Performed by: PHYSICIAN ASSISTANT

## 2022-03-14 PROCEDURE — 81025 URINE PREGNANCY TEST: CPT | Performed by: PHYSICIAN ASSISTANT

## 2022-03-14 PROCEDURE — 87491 CHLMYD TRACH DNA AMP PROBE: CPT

## 2022-03-14 RX ORDER — METRONIDAZOLE 500 MG/1
500 TABLET ORAL 2 TIMES DAILY
Qty: 14 TABLET | Refills: 0 | Status: SHIPPED | OUTPATIENT
Start: 2022-03-14 | End: 2022-03-21

## 2022-03-14 RX ORDER — NITROFURANTOIN 25; 75 MG/1; MG/1
100 CAPSULE ORAL 2 TIMES DAILY
Qty: 10 CAPSULE | Refills: 0 | Status: SHIPPED | OUTPATIENT
Start: 2022-03-14 | End: 2022-03-19

## 2022-03-14 ASSESSMENT — ENCOUNTER SYMPTOMS
VOMITING: 0
CHILLS: 0
ABDOMINAL PAIN: 0
NAUSEA: 0
FEVER: 0

## 2022-03-14 ASSESSMENT — FIBROSIS 4 INDEX: FIB4 SCORE: 0.51

## 2022-03-15 DIAGNOSIS — R30.0 DYSURIA: ICD-10-CM

## 2022-03-15 DIAGNOSIS — N89.8 VAGINAL DISCHARGE: ICD-10-CM

## 2022-03-15 DIAGNOSIS — N89.8 VAGINAL ODOR: ICD-10-CM

## 2022-03-15 LAB
C TRACH DNA GENITAL QL NAA+PROBE: NEGATIVE
CANDIDA DNA VAG QL PROBE+SIG AMP: NEGATIVE
G VAGINALIS DNA VAG QL PROBE+SIG AMP: POSITIVE
N GONORRHOEA DNA GENITAL QL NAA+PROBE: NEGATIVE
SPECIMEN SOURCE: NORMAL
T VAGINALIS DNA VAG QL PROBE+SIG AMP: NEGATIVE

## 2022-03-15 NOTE — PROGRESS NOTES
Subjective:   Sandy Yang is a 26 y.o. female who presents for UTI (X 2 days with burning with urination with r side low back pain. Denies fever or chills. )        Patient with history of recurrent bacterial vaginosis presents with concerns of intermittent dysuria for the last 3 days.  Symptoms are typically worse in the morning and wax and wane in severity.  She denies urinary frequency and urinary urgency.  She does endorse some abnormal vaginal discharge and abnormal vaginal odor.  She has had similar symptoms in the past with bacterial vaginosis as well as with a UTI.  She denies nausea, vomiting, fevers, chills.  She does endorse occasional right flank pain, but is not currently experiencing this.  Denies abdominal pain.  Prior episodes of BV or previously treated with topical antibiotics and these proved less effective.  She does tolerate oral oral Flagyl well.    Review of Systems   Constitutional: Negative for chills, fever and malaise/fatigue.   Gastrointestinal: Negative for abdominal pain, nausea and vomiting.   Genitourinary: Positive for dysuria. Negative for frequency, hematuria and urgency.       PMH:  has a past medical history of Cancer (HCC), Panic anxiety syndrome (7/8/2013), and PUD (peptic ulcer disease).  MEDS:   Current Outpatient Medications:   •  nitrofurantoin (MACROBID) 100 MG Cap, Take 1 Capsule by mouth 2 times a day for 5 days., Disp: 10 Capsule, Rfl: 0  •  metroNIDAZOLE (FLAGYL) 500 MG Tab, Take 1 Tablet by mouth 2 times a day for 7 days., Disp: 14 Tablet, Rfl: 0  •  ondansetron (ZOFRAN) 8 MG Tab, Take 1 Tablet by mouth every 8 hours as needed for Nausea/Vomiting. (Patient not taking: Reported on 3/14/2022), Disp: 15 Tablet, Rfl: 0  ALLERGIES:   Allergies   Allergen Reactions   • Lidocaine    • Morphine Rash     Generalized itching and redness.    • Novocain      Used during dental surgery; had no affect toward deadening her pain   • Rocephin [Ceftriaxone] Rash      "Itching     • Zoloft Shortness of Breath     Panic      SURGHX:   Past Surgical History:   Procedure Laterality Date   • REPEAT C SECTION  7/18/2017    Procedure: REPEAT C SECTION;  Surgeon: Olive Valle M.D.;  Location: LABOR AND DELIVERY;  Service:    • BREAST BIOPSY  2/19/2014    Performed by Reid Adorno M.D. at SURGERY SAME DAY [facility] ORS   • PRIMARY C SECTION  8/9/12    for breech   • GASTROSCOPY  12/23/2009    Performed by SUSANA MUÑOZ at SURGERY [facility] ORS     SOCHX:  reports that she has quit smoking. Her smoking use included cigarettes. She has a 2.00 pack-year smoking history. She has never used smokeless tobacco. She reports previous alcohol use. She reports previous drug use. Drug: Marijuana.  FH: Family history was reviewed, no pertinent findings to report   Objective:   /64   Pulse 86   Temp 37.2 °C (98.9 °F) (Temporal)   Resp 14   Ht 1.651 m (5' 5\")   Wt 76.7 kg (169 lb)   LMP  (LMP Unknown)   SpO2 98%   BMI 28.12 kg/m²   Physical Exam  Vitals reviewed.   Constitutional:       General: She is not in acute distress.     Appearance: Normal appearance. She is well-developed. She is not toxic-appearing.   HENT:      Head: Normocephalic and atraumatic.      Right Ear: External ear normal.      Left Ear: External ear normal.      Nose: Nose normal.   Cardiovascular:      Rate and Rhythm: Normal rate and regular rhythm.   Pulmonary:      Effort: Pulmonary effort is normal. No respiratory distress.      Breath sounds: No stridor.   Abdominal:      General: Abdomen is flat.      Palpations: Abdomen is soft.      Tenderness: There is no abdominal tenderness. There is no right CVA tenderness, left CVA tenderness, guarding or rebound.      Comments: Skin of the back and abdomen is pink, healthy, atraumatic.  No abnormal lesions or rashes.   Skin:     General: Skin is dry.   Neurological:      Comments: Alert and oriented.    Psychiatric:         Speech: Speech normal.         " Behavior: Behavior normal.           Assessment/Plan:   1. Dysuria  - POCT Urinalysis  - POCT PREGNANCY  - Urine Culture; Future  - nitrofurantoin (MACROBID) 100 MG Cap; Take 1 Capsule by mouth 2 times a day for 5 days.  Dispense: 10 Capsule; Refill: 0    2. Vaginal discharge  - CHLAMYDIA/GC PCR URINE; Future  - VAGINAL PATHOGENS DNA PANEL; Future  - metroNIDAZOLE (FLAGYL) 500 MG Tab; Take 1 Tablet by mouth 2 times a day for 7 days.  Dispense: 14 Tablet; Refill: 0    3. Vaginal odor  - CHLAMYDIA/GC PCR URINE; Future  - VAGINAL PATHOGENS DNA PANEL; Future  - metroNIDAZOLE (FLAGYL) 500 MG Tab; Take 1 Tablet by mouth 2 times a day for 7 days.  Dispense: 14 Tablet; Refill: 0      Patient records reviewed.  She was evaluated in January 2021 with small leuks in her urine.  Her urine culture was positive for E. coli.  All prior testing for vaginal pathogens were positive for bacterial vaginosis as well.    Today patient has only small leuks in her urine, but given history I do think that a urinary tract infection is likely.  Clinical suspicion for pyelonephritis or complicated UTI low at this time.  Is also possible that her symptoms could be related to bacterial vaginosis given her history of recurrent Gardnerella.    Urine sent for culture.  Vaginal pathogen and G/C testing pending.  Patient started on antibiotic therapy.  I will contact her via [employer] with results and adjust treatment plan as indicated.  Patient has a good understanding of return and ED precautions.    Differential diagnosis, natural history, supportive care, and indications for immediate follow-up discussed.

## 2022-03-15 NOTE — RESULT ENCOUNTER NOTE
Eitan Delgado,    Your testing was positive for bacterial vaginosis.  We already have you on the correct medication to treat this.  If your symptoms fail to fully resolve please follow-up with your PCP or gynecologist for reevaluation.    Kind regards,  IHSAN

## 2022-03-17 LAB
BACTERIA UR CULT: NORMAL
SIGNIFICANT IND 70042: NORMAL
SITE SITE: NORMAL
SOURCE SOURCE: NORMAL

## 2022-05-20 ENCOUNTER — HOSPITAL ENCOUNTER (OUTPATIENT)
Facility: MEDICAL CENTER | Age: 27
End: 2022-05-20
Attending: NURSE PRACTITIONER
Payer: COMMERCIAL

## 2022-05-20 ENCOUNTER — OFFICE VISIT (OUTPATIENT)
Dept: URGENT CARE | Facility: CLINIC | Age: 27
End: 2022-05-20
Payer: COMMERCIAL

## 2022-05-20 VITALS
HEIGHT: 65 IN | OXYGEN SATURATION: 97 % | DIASTOLIC BLOOD PRESSURE: 70 MMHG | HEART RATE: 94 BPM | RESPIRATION RATE: 16 BRPM | BODY MASS INDEX: 27.26 KG/M2 | TEMPERATURE: 97.9 F | WEIGHT: 163.6 LBS | SYSTOLIC BLOOD PRESSURE: 120 MMHG

## 2022-05-20 DIAGNOSIS — J02.9 PHARYNGITIS, UNSPECIFIED ETIOLOGY: ICD-10-CM

## 2022-05-20 DIAGNOSIS — H66.001 NON-RECURRENT ACUTE SUPPURATIVE OTITIS MEDIA OF RIGHT EAR WITHOUT SPONTANEOUS RUPTURE OF TYMPANIC MEMBRANE: ICD-10-CM

## 2022-05-20 PROCEDURE — 99214 OFFICE O/P EST MOD 30 MIN: CPT | Performed by: NURSE PRACTITIONER

## 2022-05-20 PROCEDURE — 0240U HCHG SARS-COV-2 COVID-19 NFCT DS RESP RNA 3 TRGT MIC: CPT

## 2022-05-20 RX ORDER — AMOXICILLIN AND CLAVULANATE POTASSIUM 875; 125 MG/1; MG/1
1 TABLET, FILM COATED ORAL 2 TIMES DAILY
Qty: 10 TABLET | Refills: 0 | Status: SHIPPED | OUTPATIENT
Start: 2022-05-20 | End: 2022-05-25

## 2022-05-20 RX ORDER — ACETAMINOPHEN 500 MG
500-1000 TABLET ORAL EVERY 6 HOURS PRN
Qty: 30 TABLET | Refills: 0 | Status: SHIPPED | OUTPATIENT
Start: 2022-05-20

## 2022-05-20 RX ORDER — FLUTICASONE PROPIONATE 50 MCG
1 SPRAY, SUSPENSION (ML) NASAL DAILY
Qty: 16 G | Refills: 0 | Status: SHIPPED | OUTPATIENT
Start: 2022-05-20

## 2022-05-20 RX ORDER — PSEUDOEPHEDRINE HCL 30 MG
60 TABLET ORAL EVERY 4 HOURS PRN
Qty: 120 TABLET | Refills: 6 | Status: SHIPPED | OUTPATIENT
Start: 2022-05-20

## 2022-05-20 RX ORDER — IBUPROFEN 400 MG/1
400 TABLET ORAL EVERY 6 HOURS PRN
Qty: 30 TABLET | Refills: 0 | Status: SHIPPED | OUTPATIENT
Start: 2022-05-20

## 2022-05-20 ASSESSMENT — FIBROSIS 4 INDEX: FIB4 SCORE: 0.51

## 2022-05-20 ASSESSMENT — ENCOUNTER SYMPTOMS
DIZZINESS: 0
SORE THROAT: 1
SHORTNESS OF BREATH: 0
CHILLS: 0
VOMITING: 0
WHEEZING: 0
RHINORRHEA: 1
FEVER: 0
HEADACHES: 1
COUGH: 1
MYALGIAS: 0
NAUSEA: 0
EYE PAIN: 0

## 2022-05-20 NOTE — LETTER
May 22, 2022         Patient: Sandy Yang   YOB: 1995   Date of Visit: 5/20/2022           To Whom it May Concern:     Your employee was seen in our clinic today.  A concern for COVID-19 has been identified and testing is in progress.     We are asking you to excuse absences while following self-isolation protocol per Center for Disease Control (CDC) guidelines.  Your employee will be able to access test results through our electronic delivery system called [employer].     If the results of testing are negative, and once there has been no fever (temperature >100.4 F) for at least 72 hours without treatment, and no vomiting or diarrhea for at least 48 hours, then return to work is approved.    If the results of testing are POSITIVE, your employee should follow the CDC guidelines.  These are isolations for a minimum of 5 days and at least 24 hours have passed since your last fever without the use of a fever-reducing medication and all other systems have improved.  In addition, it is recommended you wear a mask for an additional 5 days to the health department may contact you and provide further directions regarding self-isolation and return to work.    In general, repeat testing is not necessary and not offered through our [facility].     This is the only note that will be provided from [facility] for this visit.  Your employee will require an appointment with a primary care provider if FMLA or disability forms are required.         If you have any questions please do not hesitate to call me at the phone number listed below.    Sincerely,      Michael GONZALEZ  [phone]

## 2022-05-20 NOTE — PROGRESS NOTES
Subjective:   Sandy Yang is a 26 y.o. female who presents for Otalgia ((R) Body aches, tonsils swollen, headaches, neck pain, sore throat x 3 days )      URI   This is a new problem. Episode onset: 3 day. The problem has been unchanged. There has been no fever. Associated symptoms include congestion, coughing, ear pain, headaches, rhinorrhea and a sore throat. Pertinent negatives include no chest pain, nausea, rash, vomiting or wheezing. She has tried nothing for the symptoms. The treatment provided no relief.       Review of Systems   Constitutional: Positive for malaise/fatigue. Negative for chills and fever.   HENT: Positive for congestion, ear pain, rhinorrhea and sore throat.    Eyes: Negative for pain.   Respiratory: Positive for cough. Negative for shortness of breath and wheezing.    Cardiovascular: Negative for chest pain.   Gastrointestinal: Negative for nausea and vomiting.   Genitourinary: Negative for hematuria.   Musculoskeletal: Negative for myalgias.   Skin: Negative for rash.   Neurological: Positive for headaches. Negative for dizziness.       Medications:    • acetaminophen Tabs  • amoxicillin-clavulanate Tabs  • fluticasone  • ibuprofen Tabs  • pseudoephedrine Tabs    Allergies: Lidocaine, Morphine, Novocain, Rocephin [ceftriaxone], and Zoloft    Problem List: Snady Yang does not have any pertinent problems on file.    Surgical History:  Past Surgical History:   Procedure Laterality Date   • REPEAT C SECTION  7/18/2017    Procedure: REPEAT C SECTION;  Surgeon: Olive Valle M.D.;  Location: LABOR AND DELIVERY;  Service:    • BREAST BIOPSY  2/19/2014    Performed by Reid Adorno M.D. at SURGERY SAME DAY [facility] ORS   • PRIMARY C SECTION  8/9/12    for breech   • GASTROSCOPY  12/23/2009    Performed by SUSANA MUÑOZ at SURGERY [facility] ORS       Past Social Hx: Sandy Yang  reports that she has quit smoking. Her smoking use included  "cigarettes. She has a 2.00 pack-year smoking history. She has never used smokeless tobacco. She reports previous alcohol use. She reports previous drug use. Drug: Marijuana.     Past Family Hx:  Sandy Yang family history includes Cancer in her father and paternal grandmother; Diabetes in her maternal grandfather; Heart Disease in her maternal grandfather and maternal uncle.     Problem list, medications, and allergies reviewed by myself today in Epic.     Objective:     /70   Pulse 94   Temp 36.6 °C (97.9 °F)   Resp 16   Ht 1.651 m (5' 5\")   Wt 74.2 kg (163 lb 9.6 oz)   SpO2 97%   BMI 27.22 kg/m²     Physical Exam  Vitals and nursing note reviewed.   Constitutional:       General: She is not in acute distress.     Appearance: She is well-developed.   HENT:      Head: Normocephalic and atraumatic.      Right Ear: External ear normal. No swelling. No mastoid tenderness. Tympanic membrane is erythematous and retracted. Tympanic membrane is not bulging.      Left Ear: Tympanic membrane and external ear normal.      Nose: Nose normal.      Right Sinus: No maxillary sinus tenderness or frontal sinus tenderness.      Left Sinus: No maxillary sinus tenderness or frontal sinus tenderness.      Mouth/Throat:      Mouth: Mucous membranes are moist.      Pharynx: Uvula midline. No posterior oropharyngeal erythema.      Tonsils: No tonsillar exudate or tonsillar abscesses.   Eyes:      General:         Right eye: No discharge.         Left eye: No discharge.      Conjunctiva/sclera: Conjunctivae normal.   Cardiovascular:      Rate and Rhythm: Normal rate.   Pulmonary:      Effort: Pulmonary effort is normal. No respiratory distress.      Breath sounds: Normal breath sounds.   Abdominal:      General: There is no distension.   Musculoskeletal:         General: Normal range of motion.   Skin:     General: Skin is warm and dry.   Neurological:      General: No focal deficit present.      Mental Status: " She is alert and oriented to person, place, and time. Mental status is at baseline.      Gait: Gait (gait at baseline) normal.   Psychiatric:         Judgment: Judgment normal.         Assessment/Plan:     Diagnosis and associated orders:     1. Pharyngitis, unspecified etiology  POCT Rapid Strep A    CoV-2 and Flu A/B by PCR (24 hour In-House): Collect NP swab in VTM    ibuprofen (MOTRIN) 400 MG Tab    acetaminophen (TYLENOL) 500 MG Tab    fluticasone (FLONASE) 50 MCG/ACT nasal spray    pseudoephedrine (SUDAFED CONGESTION) 30 MG Tab   2. Non-recurrent acute suppurative otitis media of right ear without spontaneous rupture of tympanic membrane  amoxicillin-clavulanate (AUGMENTIN) 875-125 MG Tab      Comments/MDM:   Strep negative     AOM likely secondary to uri Contingent antibiotic prescription given to patient to fill upon meeting criteria of guidelines discussed.       Test for COVID-19 via PCR. Result will be reviewed by myself. We will call/message back for results and appropriate further instructions. Instructed to sign up for [employer] if they have not already. Result will be automatically released to [employer] application for patient review. I will be sending a message with Next Step Instructions to [employer] soon after resulted.   Symptomatic and supportive care:   Plenty of oral hydration and rest   Over the counter cough suppressant as directed.  Tylenol or ibuprofen for pain and fever as directed.   Warm salt water gargles for sore throat, soft foods, cool liquids.   Saline nasal spray and Flonase as a decongestant.   Infection control measures at home. Stay away from people, Hand washing, covering sneeze/cough, disinfect surfaces.   Remain home from work, school, and other populated environments. Work note provided with information of quarantine measures per CDC guidelines.   Overall, the patient is well-appearing. They are not hypoxic, afebrile, and a normal pulmonary exam.      •            Please note that  this dictation was created using voice recognition software. I have made a reasonable attempt to correct obvious errors, but I expect that there are errors of grammar and possibly content that I did not discover before finalizing the note.    This note was electronically signed by Michael GONZALEZ.

## 2022-05-21 DIAGNOSIS — R05.9 COUGH: ICD-10-CM

## 2022-05-21 LAB
FLUAV RNA SPEC QL NAA+PROBE: NEGATIVE
FLUBV RNA SPEC QL NAA+PROBE: NEGATIVE
SARS-COV-2 RNA RESP QL NAA+PROBE: DETECTED
SPECIMEN SOURCE: ABNORMAL

## 2022-05-21 RX ORDER — DEXTROMETHORPHAN POLISTIREX 30 MG/5ML
60 SUSPENSION ORAL 2 TIMES DAILY
Qty: 280 ML | Refills: 0 | Status: SHIPPED | OUTPATIENT
Start: 2022-05-21

## 2022-05-21 RX ORDER — DEXTROMETHORPHAN HYDROBROMIDE AND PROMETHAZINE HYDROCHLORIDE 15; 6.25 MG/5ML; MG/5ML
5 SYRUP ORAL EVERY 4 HOURS PRN
Qty: 120 ML | Refills: 0 | Status: SHIPPED | OUTPATIENT
Start: 2022-05-21

## 2022-05-23 ENCOUNTER — OFFICE VISIT (OUTPATIENT)
Dept: URGENT CARE | Facility: CLINIC | Age: 27
End: 2022-05-23
Payer: COMMERCIAL

## 2022-05-23 VITALS
HEIGHT: 65 IN | BODY MASS INDEX: 27.32 KG/M2 | TEMPERATURE: 97.8 F | RESPIRATION RATE: 20 BRPM | HEART RATE: 90 BPM | SYSTOLIC BLOOD PRESSURE: 130 MMHG | DIASTOLIC BLOOD PRESSURE: 90 MMHG | OXYGEN SATURATION: 98 % | WEIGHT: 164 LBS

## 2022-05-23 DIAGNOSIS — R51.9 NONINTRACTABLE HEADACHE, UNSPECIFIED CHRONICITY PATTERN, UNSPECIFIED HEADACHE TYPE: ICD-10-CM

## 2022-05-23 DIAGNOSIS — U07.1 COVID: ICD-10-CM

## 2022-05-23 DIAGNOSIS — R03.0 ELEVATED BLOOD PRESSURE READING: ICD-10-CM

## 2022-05-23 PROCEDURE — 99215 OFFICE O/P EST HI 40 MIN: CPT | Performed by: FAMILY MEDICINE

## 2022-05-23 RX ORDER — KETOROLAC TROMETHAMINE 10 MG/1
10 TABLET, FILM COATED ORAL EVERY 8 HOURS PRN
Qty: 10 TABLET | Refills: 0 | Status: SHIPPED | OUTPATIENT
Start: 2022-05-23 | End: 2022-05-27

## 2022-05-23 RX ORDER — KETOROLAC TROMETHAMINE 30 MG/ML
30 INJECTION, SOLUTION INTRAMUSCULAR; INTRAVENOUS ONCE
Status: COMPLETED | OUTPATIENT
Start: 2022-05-23 | End: 2022-05-23

## 2022-05-23 RX ADMIN — KETOROLAC TROMETHAMINE 30 MG: 30 INJECTION, SOLUTION INTRAMUSCULAR; INTRAVENOUS at 16:45

## 2022-05-23 ASSESSMENT — FIBROSIS 4 INDEX: FIB4 SCORE: 0.51

## 2022-05-23 NOTE — PROGRESS NOTES
Chief Complaint   Patient presents with   • Headache     COVID POS- Headaches, back of head. Throbbing.  X 1 day.  Day 6 of COVID.  Had headaches in January when had covid last, requesting shot that was given then.           Cough    Pt c/o cough , headache x 6 d.         She tested positive  For COVID 20 May             Current smoker - 1/2 ppd x 2 yr         The problem has been unchanged. The problem occurs constantly. The cough is dry.     Pt denies  :  muscle aches, fever.   There is no nasal congestion or sinus pain or pressure.      Pertinent negatives include no    , nausea, vomiting, diarrhea, sweats, weight loss or wheezing. Nothing aggravates the symptoms.  Patient has tried nothing for the symptoms. There is no history of asthma.        Past Medical History:   Diagnosis Date   • Panic anxiety syndrome 7/8/2013    associated with postpartum depression   • Cancer (HCC)     basal cell   • PUD (peptic ulcer disease)          Social History     Tobacco Use   • Smoking status: Former Smoker     Packs/day: 0.50     Years: 4.00     Pack years: 2.00     Types: Cigarettes   • Smokeless tobacco: Never Used   • Tobacco comment: 6 cig/day   Vaping Use   • Vaping Use: Never used   Substance Use Topics   • Alcohol use: Not Currently     Alcohol/week: 0.0 oz   • Drug use: Not Currently     Types: Marijuana         Current Outpatient Medications on File Prior to Visit   Medication Sig Dispense Refill   • promethazine-dextromethorphan (PROMETHAZINE-DM) 6.25-15 MG/5ML syrup Take 5 mL by mouth every four hours as needed for Cough. 120 mL 0   • Dextromethorphan Polistirex ER (ROBITUSSIN 12 HOUR COUGH) 30 MG/5ML Suspension Extended Release Take 10 mL by mouth 2 times a day. 280 mL 0   • ibuprofen (MOTRIN) 400 MG Tab Take 1 Tablet by mouth every 6 hours as needed for Moderate Pain or Fever. 30 Tablet 0   • acetaminophen (TYLENOL) 500 MG Tab Take 1-2 Tablets by mouth every 6 hours as needed for Moderate Pain. 30 Tablet 0   •  "fluticasone (FLONASE) 50 MCG/ACT nasal spray Administer 1 Spray into affected nostril(S) every day. 16 g 0   • pseudoephedrine (SUDAFED CONGESTION) 30 MG Tab Take 2 Tablets by mouth every four hours as needed for Congestion. 120 Tablet 6   • amoxicillin-clavulanate (AUGMENTIN) 875-125 MG Tab Take 1 Tablet by mouth 2 times a day for 5 days. 10 Tablet 0     No current facility-administered medications on file prior to visit.                    Review of Systems   Constitutional: Negative for fever and weight loss.   HENT: negative for otalgia  Cardiovascular - denies chest pain or dyspnea  Respiratory: Positive for cough.  .  Negative for wheezing.    Neurological: Negative for headaches.   GI - denies nausea, vomiting or diarrhea  Neuro - denies numbness or tingling.            Objective:      BP (!) 130/90   Pulse 90   Temp 36.6 °C (97.8 °F)   Resp 20   Ht 1.651 m (5' 5\")   Wt 74.4 kg (164 lb)   SpO2 98%     Physical Exam   Constitutional: patient is oriented to person, place, and time. Patient appears well-developed and well-nourished. No distress.   HENT:   Head: Normocephalic and atraumatic.   Right Ear: External ear normal.   Left Ear: External ear normal.   Nose: Mucosal edema  present. Right sinus exhibits no maxillary sinus tenderness. Left sinus exhibits no maxillary sinus tenderness.   Mouth/Throat: Mucous membranes are normal. No oral lesions.  No posterior pharyngeal erythema.  No oropharyngeal exudate or posterior oropharyngeal edema.   Eyes: Conjunctivae and EOM are normal. Pupils are equal, round, and reactive to light. Right eye exhibits no discharge. Left eye exhibits no discharge. No scleral icterus.   Neck: Normal range of motion. Neck supple. No tracheal deviation present.   Cardiovascular: Normal rate, regular rhythm and normal heart sounds.  Exam reveals no friction rub.    Pulmonary/Chest: Effort normal. No respiratory distress. Patient has no wheezes or rhonchi. Patient has no rales.  "   Musculoskeletal:  exhibits no edema.   Lymphadenopathy:     Patient has no cervical adenopathy.      Neurological: patient is alert and oriented to person, place, and time.   Skin: Skin is warm and dry. No rash noted. No erythema.   Psychiatric: patient  has a normal mood and affect.  behavior is normal.   Nursing note and vitals reviewed.              Assessment/Plan:     1. COVID     Home isolation per CDC guidelines      Differential diagnosis, natural history, supportive care, and indications for immediate follow-up discussed. All questions answered. Patient agrees with the plan of care.     Follow-up as needed if symptoms worsen or fail to improve to PCP, Urgent care or Emergency Room.     I have personally reviewed prior external notes and test results pertinent to today's visit.  I have independently reviewed and interpreted all diagnostics ordered during this urgent care acute visit.       2. Nonintractable headache, unspecified chronicity pattern, unspecified headache type   headache improved after toradol inj       - ketorolac (TORADOL) 10 MG Tab; Take 1 Tablet by mouth every 8 hours as needed for Moderate Pain for up to 4 days.  Dispense: 10 Tablet; Refill: 0    3. Elevated blood pressure reading   likely situational   Pt does not have hx HTN   advised f/u PCP      My total time spent caring for the patient on the day of the encounter was 40  minutes.   This does not include time spent on separately billable procedures/tests.

## 2025-04-24 ENCOUNTER — OFFICE VISIT (OUTPATIENT)
Dept: URGENT CARE | Facility: CLINIC | Age: 30
End: 2025-04-24
Payer: COMMERCIAL

## 2025-04-24 ENCOUNTER — APPOINTMENT (OUTPATIENT)
Dept: RADIOLOGY | Facility: MEDICAL CENTER | Age: 30
End: 2025-04-24
Attending: STUDENT IN AN ORGANIZED HEALTH CARE EDUCATION/TRAINING PROGRAM
Payer: COMMERCIAL

## 2025-04-24 ENCOUNTER — HOSPITAL ENCOUNTER (EMERGENCY)
Facility: MEDICAL CENTER | Age: 30
End: 2025-04-24
Attending: STUDENT IN AN ORGANIZED HEALTH CARE EDUCATION/TRAINING PROGRAM
Payer: COMMERCIAL

## 2025-04-24 ENCOUNTER — HOSPITAL ENCOUNTER (OUTPATIENT)
Facility: MEDICAL CENTER | Age: 30
End: 2025-04-24
Payer: COMMERCIAL

## 2025-04-24 VITALS
OXYGEN SATURATION: 96 % | HEART RATE: 86 BPM | HEIGHT: 65 IN | RESPIRATION RATE: 20 BRPM | WEIGHT: 179 LBS | DIASTOLIC BLOOD PRESSURE: 62 MMHG | TEMPERATURE: 97.8 F | BODY MASS INDEX: 29.82 KG/M2 | SYSTOLIC BLOOD PRESSURE: 126 MMHG

## 2025-04-24 VITALS
WEIGHT: 180.12 LBS | TEMPERATURE: 96.8 F | SYSTOLIC BLOOD PRESSURE: 119 MMHG | HEART RATE: 92 BPM | BODY MASS INDEX: 30.01 KG/M2 | HEIGHT: 65 IN | OXYGEN SATURATION: 90 % | DIASTOLIC BLOOD PRESSURE: 82 MMHG | RESPIRATION RATE: 16 BRPM

## 2025-04-24 DIAGNOSIS — R82.998 LEUKOCYTES IN URINE: ICD-10-CM

## 2025-04-24 DIAGNOSIS — N89.8 VAGINAL ITCHING: ICD-10-CM

## 2025-04-24 DIAGNOSIS — R10.11 RUQ PAIN: ICD-10-CM

## 2025-04-24 DIAGNOSIS — R10.11 RIGHT UPPER QUADRANT ABDOMINAL PAIN: Primary | ICD-10-CM

## 2025-04-24 DIAGNOSIS — R11.0 NAUSEA: ICD-10-CM

## 2025-04-24 LAB
ALBUMIN SERPL BCP-MCNC: 4.2 G/DL (ref 3.2–4.9)
ALBUMIN/GLOB SERPL: 1.3 G/DL
ALP SERPL-CCNC: 77 U/L (ref 30–99)
ALT SERPL-CCNC: 12 U/L (ref 2–50)
ANION GAP SERPL CALC-SCNC: 14 MMOL/L (ref 7–16)
APPEARANCE UR: CLEAR
AST SERPL-CCNC: 22 U/L (ref 12–45)
BASOPHILS # BLD AUTO: 0.4 % (ref 0–1.8)
BASOPHILS # BLD: 0.04 K/UL (ref 0–0.12)
BILIRUB SERPL-MCNC: <0.2 MG/DL (ref 0.1–1.5)
BILIRUB UR STRIP-MCNC: NORMAL MG/DL
BUN SERPL-MCNC: 12 MG/DL (ref 8–22)
CALCIUM ALBUM COR SERPL-MCNC: 8.8 MG/DL (ref 8.5–10.5)
CALCIUM SERPL-MCNC: 9 MG/DL (ref 8.5–10.5)
CHLORIDE SERPL-SCNC: 102 MMOL/L (ref 96–112)
CO2 SERPL-SCNC: 20 MMOL/L (ref 20–33)
COLOR UR AUTO: YELLOW
CREAT SERPL-MCNC: 0.93 MG/DL (ref 0.5–1.4)
EOSINOPHIL # BLD AUTO: 0.38 K/UL (ref 0–0.51)
EOSINOPHIL NFR BLD: 3.6 % (ref 0–6.9)
ERYTHROCYTE [DISTWIDTH] IN BLOOD BY AUTOMATED COUNT: 44.8 FL (ref 35.9–50)
GFR SERPLBLD CREATININE-BSD FMLA CKD-EPI: 84 ML/MIN/1.73 M 2
GLOBULIN SER CALC-MCNC: 3.2 G/DL (ref 1.9–3.5)
GLUCOSE SERPL-MCNC: 116 MG/DL (ref 65–99)
GLUCOSE UR STRIP.AUTO-MCNC: NORMAL MG/DL
HCG SERPL QL: NEGATIVE
HCT VFR BLD AUTO: 44.4 % (ref 37–47)
HGB BLD-MCNC: 14.4 G/DL (ref 12–16)
IMM GRANULOCYTES # BLD AUTO: 0.03 K/UL (ref 0–0.11)
IMM GRANULOCYTES NFR BLD AUTO: 0.3 % (ref 0–0.9)
KETONES UR STRIP.AUTO-MCNC: NORMAL MG/DL
LEUKOCYTE ESTERASE UR QL STRIP.AUTO: NORMAL
LIPASE SERPL-CCNC: 21 U/L (ref 11–82)
LYMPHOCYTES # BLD AUTO: 3.02 K/UL (ref 1–4.8)
LYMPHOCYTES NFR BLD: 28.6 % (ref 22–41)
MCH RBC QN AUTO: 33 PG (ref 27–33)
MCHC RBC AUTO-ENTMCNC: 32.4 G/DL (ref 32.2–35.5)
MCV RBC AUTO: 101.8 FL (ref 81.4–97.8)
MONOCYTES # BLD AUTO: 0.73 K/UL (ref 0–0.85)
MONOCYTES NFR BLD AUTO: 6.9 % (ref 0–13.4)
NEUTROPHILS # BLD AUTO: 6.35 K/UL (ref 1.82–7.42)
NEUTROPHILS NFR BLD: 60.2 % (ref 44–72)
NITRITE UR QL STRIP.AUTO: NORMAL
NRBC # BLD AUTO: 0 K/UL
NRBC BLD-RTO: 0 /100 WBC (ref 0–0.2)
PH UR STRIP.AUTO: 5.5 [PH] (ref 5–8)
PLATELET # BLD AUTO: 311 K/UL (ref 164–446)
PMV BLD AUTO: 10 FL (ref 9–12.9)
POCT INT CON NEG: NEGATIVE
POCT INT CON POS: POSITIVE
POCT URINE PREGNANCY TEST: NEGATIVE
POTASSIUM SERPL-SCNC: 3.9 MMOL/L (ref 3.6–5.5)
PROT SERPL-MCNC: 7.4 G/DL (ref 6–8.2)
PROT UR QL STRIP: NORMAL MG/DL
RBC # BLD AUTO: 4.36 M/UL (ref 4.2–5.4)
RBC UR QL AUTO: NORMAL
SODIUM SERPL-SCNC: 136 MMOL/L (ref 135–145)
SP GR UR STRIP.AUTO: 1.02
UROBILINOGEN UR STRIP-MCNC: 0.2 MG/DL
WBC # BLD AUTO: 10.6 K/UL (ref 4.8–10.8)

## 2025-04-24 PROCEDURE — 81025 URINE PREGNANCY TEST: CPT

## 2025-04-24 PROCEDURE — 83690 ASSAY OF LIPASE: CPT

## 2025-04-24 PROCEDURE — 36415 COLL VENOUS BLD VENIPUNCTURE: CPT

## 2025-04-24 PROCEDURE — 96375 TX/PRO/DX INJ NEW DRUG ADDON: CPT

## 2025-04-24 PROCEDURE — A9270 NON-COVERED ITEM OR SERVICE: HCPCS | Performed by: STUDENT IN AN ORGANIZED HEALTH CARE EDUCATION/TRAINING PROGRAM

## 2025-04-24 PROCEDURE — 87660 TRICHOMONAS VAGIN DIR PROBE: CPT

## 2025-04-24 PROCEDURE — 700111 HCHG RX REV CODE 636 W/ 250 OVERRIDE (IP): Mod: JZ | Performed by: STUDENT IN AN ORGANIZED HEALTH CARE EDUCATION/TRAINING PROGRAM

## 2025-04-24 PROCEDURE — 96374 THER/PROPH/DIAG INJ IV PUSH: CPT

## 2025-04-24 PROCEDURE — 80053 COMPREHEN METABOLIC PANEL: CPT

## 2025-04-24 PROCEDURE — 84703 CHORIONIC GONADOTROPIN ASSAY: CPT

## 2025-04-24 PROCEDURE — RXMED WILLOW AMBULATORY MEDICATION CHARGE: Performed by: STUDENT IN AN ORGANIZED HEALTH CARE EDUCATION/TRAINING PROGRAM

## 2025-04-24 PROCEDURE — 99284 EMERGENCY DEPT VISIT MOD MDM: CPT

## 2025-04-24 PROCEDURE — 81002 URINALYSIS NONAUTO W/O SCOPE: CPT

## 2025-04-24 PROCEDURE — 87480 CANDIDA DNA DIR PROBE: CPT

## 2025-04-24 PROCEDURE — 85025 COMPLETE CBC W/AUTO DIFF WBC: CPT

## 2025-04-24 PROCEDURE — 700102 HCHG RX REV CODE 250 W/ 637 OVERRIDE(OP): Performed by: STUDENT IN AN ORGANIZED HEALTH CARE EDUCATION/TRAINING PROGRAM

## 2025-04-24 PROCEDURE — 87510 GARDNER VAG DNA DIR PROBE: CPT

## 2025-04-24 PROCEDURE — 99214 OFFICE O/P EST MOD 30 MIN: CPT

## 2025-04-24 PROCEDURE — 76705 ECHO EXAM OF ABDOMEN: CPT

## 2025-04-24 RX ORDER — IBUPROFEN 200 MG
600 TABLET ORAL EVERY 6 HOURS PRN
COMMUNITY

## 2025-04-24 RX ORDER — DICYCLOMINE HCL 20 MG
20 TABLET ORAL ONCE
Status: COMPLETED | OUTPATIENT
Start: 2025-04-24 | End: 2025-04-24

## 2025-04-24 RX ORDER — MORPHINE SULFATE 4 MG/ML
4 INJECTION INTRAVENOUS ONCE
Status: COMPLETED | OUTPATIENT
Start: 2025-04-24 | End: 2025-04-24

## 2025-04-24 RX ORDER — METOCLOPRAMIDE HYDROCHLORIDE 5 MG/ML
10 INJECTION INTRAMUSCULAR; INTRAVENOUS ONCE
Status: COMPLETED | OUTPATIENT
Start: 2025-04-24 | End: 2025-04-24

## 2025-04-24 RX ORDER — ONDANSETRON 4 MG/1
4 TABLET, ORALLY DISINTEGRATING ORAL EVERY 6 HOURS PRN
Qty: 10 TABLET | Refills: 0 | Status: SHIPPED | OUTPATIENT
Start: 2025-04-24

## 2025-04-24 RX ORDER — SUCRALFATE 1 G/1
1 TABLET ORAL
Qty: 120 TABLET | Refills: 3 | Status: SHIPPED | OUTPATIENT
Start: 2025-04-24

## 2025-04-24 RX ORDER — ONDANSETRON 2 MG/ML
4 INJECTION INTRAMUSCULAR; INTRAVENOUS ONCE
Status: COMPLETED | OUTPATIENT
Start: 2025-04-24 | End: 2025-04-24

## 2025-04-24 RX ORDER — SUCRALFATE 1 G/1
1 TABLET ORAL ONCE
Status: COMPLETED | OUTPATIENT
Start: 2025-04-24 | End: 2025-04-24

## 2025-04-24 RX ORDER — FAMOTIDINE 20 MG/1
20 TABLET, FILM COATED ORAL ONCE
Status: COMPLETED | OUTPATIENT
Start: 2025-04-24 | End: 2025-04-24

## 2025-04-24 RX ADMIN — MORPHINE SULFATE 4 MG: 4 INJECTION, SOLUTION INTRAMUSCULAR; INTRAVENOUS at 19:28

## 2025-04-24 RX ADMIN — METOCLOPRAMIDE 10 MG: 5 INJECTION, SOLUTION INTRAMUSCULAR; INTRAVENOUS at 21:29

## 2025-04-24 RX ADMIN — FAMOTIDINE 20 MG: 20 TABLET, FILM COATED ORAL at 22:06

## 2025-04-24 RX ADMIN — DICYCLOMINE HYDROCHLORIDE 20 MG: 20 TABLET ORAL at 22:07

## 2025-04-24 RX ADMIN — ONDANSETRON 4 MG: 2 INJECTION INTRAMUSCULAR; INTRAVENOUS at 19:28

## 2025-04-24 RX ADMIN — SUCRALFATE 1 G: 1 TABLET ORAL at 22:07

## 2025-04-24 ASSESSMENT — ENCOUNTER SYMPTOMS
NAUSEA: 1
STRIDOR: 0
EYE REDNESS: 0
FLANK PAIN: 0
MYALGIAS: 0
ABDOMINAL PAIN: 1
DIZZINESS: 0
WHEEZING: 0
ROS GI COMMENTS: POSITIVE FOR LOSS OF APPETITE
VOMITING: 0
SPUTUM PRODUCTION: 0
DIARRHEA: 1
COUGH: 0
SORE THROAT: 0
EYE DISCHARGE: 0
EYE PAIN: 0
FEVER: 0
SHORTNESS OF BREATH: 0
CHILLS: 1
PALPITATIONS: 0
HEADACHES: 0

## 2025-04-25 ENCOUNTER — PHARMACY VISIT (OUTPATIENT)
Dept: PHARMACY | Facility: MEDICAL CENTER | Age: 30
End: 2025-04-25
Payer: COMMERCIAL

## 2025-04-25 ENCOUNTER — RESULTS FOLLOW-UP (OUTPATIENT)
Dept: URGENT CARE | Facility: CLINIC | Age: 30
End: 2025-04-25

## 2025-04-25 DIAGNOSIS — B96.89 BV (BACTERIAL VAGINOSIS): Primary | ICD-10-CM

## 2025-04-25 DIAGNOSIS — N76.0 BV (BACTERIAL VAGINOSIS): Primary | ICD-10-CM

## 2025-04-25 RX ORDER — METRONIDAZOLE 500 MG/1
500 TABLET ORAL 2 TIMES DAILY
Qty: 14 TABLET | Refills: 0 | Status: SHIPPED | OUTPATIENT
Start: 2025-04-25 | End: 2025-05-02

## 2025-04-25 NOTE — ED NOTES
"Bedside report received from off going RN/tech: Marce, assumed care of patient.  POC discussed with patient. Call light within reach, all needs addressed at this time.       Fall risk interventions in place: Not Applicable (all applicable per [place] Fall risk assessment)   Continuous monitoring: Pulse Ox or Blood Pressure  IVF/IV medications: Not Applicable   Oxygen: Room Air  Bedside sitter: Not Applicable   Isolation: Not Applicable    /88   Pulse 93   Temp 36 °C (96.8 °F) (Temporal)   Resp 16   Ht 1.651 m (5' 5\")   Wt 81.7 kg (180 lb 1.9 oz)   SpO2 96%    "

## 2025-04-25 NOTE — PROGRESS NOTES
Vaginal pathogen swab results reviewed, positive for bacterial vaginosis.  Patient was notified via [employer] of the results and that metronidazole was sent to her pharmacy.  Cautioned patient to not drink alcohol with this medication.

## 2025-04-25 NOTE — PROGRESS NOTES
Subjective:   Sandy Yang is a 29 y.o. female who presents for Abdominal Pain (Nausea x 2 days, had a migraine 2 days ago, painful to breathe, upper-mid abdominal pain x today, painful to move/walk, diarrhea ) and Letter for School/Work          I introduced myself to the patient and informed them that I am a Family Nurse Practitioner.    HPI:Sandy is a 29-year-old female who comes in today c/o abdominal pain, chills, nausea, diarrhea, loss of appetite. Onset was the symptoms started 2 days ago, abdominal pain started today.  Patient describes symptoms as constant. They describe the pain as sharp, severe. Aggravating factors include activity, deep breathing. Relieving factors include rest. Treatments tried at home include none. They describe their symptoms as moderate to severe.  Urinalysis does show some leukocyte esterase in her urine, she denies any dysuria, urinary urgency or frequency, flank pain, but does endorse having some recent vaginal itching, states she has a history of recurrent bacterial vaginosis in the past.  She denies any concern for STIs.  She denies having a fever, she denies any episodes of vomiting.  Patient does endorse drinking 2 glasses of wine on most days but denies any more excessive alcohol use.  She states she has had some issues with GERD in the past, takes omeprazole for this which controls her symptoms.  She states the pain she is on today is not consistent, she denies any reflux or esophagitis symptoms.      Review of Systems   Constitutional:  Positive for chills. Negative for fever and malaise/fatigue.   HENT:  Negative for congestion, ear pain and sore throat.    Eyes:  Negative for pain, discharge and redness.   Respiratory:  Negative for cough, sputum production, shortness of breath, wheezing and stridor.    Cardiovascular:  Negative for chest pain and palpitations.   Gastrointestinal:  Positive for abdominal pain, diarrhea and nausea. Negative for vomiting.        " Positive for loss of appetite   Genitourinary:  Negative for dysuria, flank pain, frequency, hematuria and urgency.   Musculoskeletal:  Negative for myalgias.   Skin:  Negative for rash.   Neurological:  Negative for dizziness and headaches.       Medications: acetaminophen Tabs  dextromethorphan polistirex ER Suer  fluticasone  ibuprofen Tabs  promethazine-dextromethorphan  pseudoephedrine Tabs     Allergies: Lidocaine, Novocain, and Rocephin [ceftriaxone]    Problem List: does not have any pertinent problems on file.    Surgical History:  Past Surgical History:   Procedure Laterality Date    REPEAT C SECTION  7/18/2017    Procedure: REPEAT C SECTION;  Surgeon: Olive Valle M.D.;  Location: LABOR AND DELIVERY;  Service:     BREAST BIOPSY  2/19/2014    Performed by Reid Adorno M.D. at SURGERY SAME DAY [facility]    PRIMARY C SECTION  8/9/12    for breech    GASTROSCOPY  12/23/2009    Performed by SUSANA MUÑOZ at SURGERY [facility]       Past Social Hx:   reports that she has quit smoking. Her smoking use included cigarettes. She has a 2 pack-year smoking history. She has never used smokeless tobacco. She reports that she does not currently use alcohol. She reports that she does not currently use drugs after having used the following drugs: Marijuana.     Past Family Hx:   family history includes Cancer in her father and paternal grandmother; Diabetes in her maternal grandfather; Heart Disease in her maternal grandfather and maternal uncle.     Problem list, medications, and allergies reviewed by myself today in Epic.   I have documented what I find to be significant in regards to past medical, social, family and surgical history  in my HPI or under PMH/PSH/FH review section, otherwise it is noncontributory     Objective:     /62 (BP Location: Left arm, Patient Position: Sitting, BP Cuff Size: Adult)   Pulse 86   Temp 36.6 °C (97.8 °F) (Temporal)   Resp 20   Ht 1.651 m (5' 5\")   Wt " 81.2 kg (179 lb)   LMP  (Within Weeks)   SpO2 96%   BMI 29.79 kg/m²     During this visit, appropriate PPE was worn, and hand hygiene was performed.    Physical Exam  Vitals reviewed.   Constitutional:       General: She is not in acute distress.     Appearance: Normal appearance. She is not ill-appearing or toxic-appearing.   HENT:      Head: Normocephalic and atraumatic.      Right Ear: External ear normal.      Left Ear: External ear normal.   Eyes:      General: No scleral icterus.        Right eye: No discharge.         Left eye: No discharge.      Extraocular Movements: Extraocular movements intact.      Conjunctiva/sclera: Conjunctivae normal.   Cardiovascular:      Rate and Rhythm: Normal rate and regular rhythm.   Abdominal:      General: Abdomen is flat. Bowel sounds are normal. There is no distension.      Palpations: Abdomen is soft. There is no hepatomegaly, splenomegaly or mass.      Tenderness: There is abdominal tenderness in the suprapubic area. There is no right CVA tenderness, left CVA tenderness, guarding or rebound. Positive signs include Day's sign. Negative signs include Rovsing's sign and McBurney's sign.      Hernia: No hernia is present.          Comments: Patient does have marked tenderness to palpation of her right upper quadrant on exam, Day sign is positive.  I did have patient fall hard from tiptoes to her heels and stomp her right foot on the ground she states that this does exacerbate the pain in her right upper quadrant, although does not double up in pain.   Genitourinary:     Comments: Deferred  Skin:     General: Skin is warm and dry.   Neurological:      General: No focal deficit present.      Mental Status: She is alert.   Psychiatric:         Mood and Affect: Mood normal.         Behavior: Behavior normal.       Lab Results/POC Test Results   Results for orders placed or performed in visit on 04/24/25   POCT Pregnancy    Collection Time: 04/24/25  5:12 PM   Result  Value Ref Range    POC Urine Pregnancy Test Negative     Internal Control Positive Positive     Internal Control Negative Negative    POCT Urinalysis    Collection Time: 04/24/25  5:17 PM   Result Value Ref Range    POC Color YELLOW Negative    POC Appearance CLEAR Negative    POC Glucose NEG Negative mg/dL    POC Bilirubin NEG Negative mg/dL    POC Ketones NEG Negative mg/dL    POC Specific Gravity 1.020 <1.005 - >1.030    POC Blood NEG Negative    POC Urine PH 5.5 5.0 - 8.0    POC Protein NEG Negative mg/dL    POC Urobiligen 0.2 Negative (0.2) mg/dL    POC Nitrites NEG Negative    POC Leukocyte Esterase TRACE Negative             Assessment/Plan:     Diagnosis and associated orders:     1. Right upper quadrant abdominal pain  POCT Urinalysis    POCT Pregnancy      2. Vaginal itching  VAGINAL PATHOGENS DNA PANEL      3. Leukocytes in urine  VAGINAL PATHOGENS DNA PANEL         Comments/MDM:     1. Right upper quadrant abdominal pain (Primary)  Patient is a 29-year-old female who presents today with right upper quadrant abdominal pain, chills, nausea, diarrhea for the last 2 days, loss of appetite.  She states pain has been progressively getting worse.   Patient has marked TTP to right upper quadrant on exam in clinic today, positive Day sign  Discussed with patient suspected Dx of possible cholecystitis/cholelithiasis, discussed possible DDx, management options (risks,benefits, and alternatives to planned treatment), natural progression.   Questions were encouraged and answered.    Instructed patient that I do not feel that their condition is stable at this time, and I do not feel that outpatient management is appropriate.  Instructed patient that my recommendation is that they go immediately to emergency room for further evaluation/higher level of care.  Discussed transportation to emergency room via paramedics, patient refuses stating they will have their significant other drive him there via private vehicle    Patient wishes to go to [facility] emergency room   I did call [facility] and gave report to Michael   Patient states they have good understanding and they are agreeable with the plan of care.     - POCT Urinalysis  - POCT Pregnancy    2. Vaginal itching  It was noted that patient's UA today in clinic was positive for leukocyte Estrace, she denies any dysuria or other urinary symptoms, denies flank pain, but does endorse some vaginal itching states she does have a history of recurrent BV in the past.  She would like to do this before she goes to the ER, discussed with her that discussed with patient that there are other vaginal pathogens that can cause symptoms that mimic UTI, suggest she do a vaginal pathogen self swab, results usually come in within 12 to 24 hours, I will notify her on MyChart of the results, treat if indicated.  I did ask her to mention this when she gets to the emergency room.  She states she understands and is agreeable.    - VAGINAL PATHOGENS DNA PANEL; Future    3. Leukocytes in urine  - VAGINAL PATHOGENS DNA PANEL; Future               I personally reviewed prior external notes and test results pertinent to today's visit.  I have independently reviewed and interpreted all diagnostics ordered during this urgent care acute visit.        Please note that this dictation was created using voice recognition software. I have made a reasonable attempt to correct obvious errors, but I expect that there are errors of grammar and possibly content that I did not discover before finalizing the note.    This note was electronically signed by Nik GONZALEZ, ANGEL, ELANA, AARON

## 2025-04-25 NOTE — ED NOTES
"Sandy Yang has been discharged from the Emergency Room.    IV discontinued and gauze bandage placed, pt in possession of belongings.    Discharge instructions, which include signs and symptoms to monitor patient for, as well as detailed information regarding follow up provided.  Patient verbalizes understanding of follow up care and medication management. All questions and concerns addressed at this time.     Patient provided with education on when to return to the ER and verbally understands with no concerns. Patient advised on setting up MyChart and information provided about patient survey.  Patient leaves ER in no apparent distress. This RN provided education regarding returning to the ER for any new concerns or changes in patient's condition.      /82   Pulse 92   Temp 36 °C (96.8 °F) (Temporal)   Resp 16   Ht 1.651 m (5' 5\")   Wt 81.7 kg (180 lb 1.9 oz)   LMP  (Within Weeks)   SpO2 90%   BMI 29.97 kg/m²     "

## 2025-04-25 NOTE — ED TRIAGE NOTES
Chief Complaint   Patient presents with    Abdominal Pain     RUQ abd pain x today.  C/o nausea.  Sent by UC for eval

## 2025-04-25 NOTE — DISCHARGE INSTRUCTIONS
You were seen in the emergency department for right upper quadrant abdominal pain.  Your workup here in the emergency department was reassuring, labs showed no elevation of liver enzymes.  Ultrasound showed no evidence of gallstones or inflammation of your gallbladder.  Medication helped with your symptoms.  We are going to prescribe you a medication Carafate which can help align the stomach can help with the pain.  We also prescribed Zofran to help with nausea  Please follow-up with your primary care physician.  You would benefit from referral to gastroenterology, you should undergo frequent screenings by GI with an upper endoscopy, a video scope exam of your esophagus and stomach to ensure that you do not develop esophageal or stomach cancer.    You should be tested for H. pylori.  Please avoid all NSAIDs like ibuprofen and naproxen

## 2025-04-28 ENCOUNTER — HOSPITAL ENCOUNTER (EMERGENCY)
Facility: MEDICAL CENTER | Age: 30
End: 2025-04-28
Attending: EMERGENCY MEDICINE
Payer: COMMERCIAL

## 2025-04-28 VITALS
HEART RATE: 63 BPM | OXYGEN SATURATION: 97 % | HEIGHT: 65 IN | DIASTOLIC BLOOD PRESSURE: 73 MMHG | WEIGHT: 183.64 LBS | SYSTOLIC BLOOD PRESSURE: 110 MMHG | BODY MASS INDEX: 30.6 KG/M2 | TEMPERATURE: 96.8 F | RESPIRATION RATE: 16 BRPM

## 2025-04-28 DIAGNOSIS — R10.12 LEFT UPPER QUADRANT ABDOMINAL PAIN: ICD-10-CM

## 2025-04-28 LAB
ALBUMIN SERPL BCP-MCNC: 4 G/DL (ref 3.2–4.9)
ALBUMIN/GLOB SERPL: 1.4 G/DL
ALP SERPL-CCNC: 66 U/L (ref 30–99)
ALT SERPL-CCNC: 15 U/L (ref 2–50)
ANION GAP SERPL CALC-SCNC: 15 MMOL/L (ref 7–16)
AST SERPL-CCNC: 19 U/L (ref 12–45)
BASOPHILS # BLD AUTO: 0.3 % (ref 0–1.8)
BASOPHILS # BLD: 0.03 K/UL (ref 0–0.12)
BILIRUB SERPL-MCNC: <0.2 MG/DL (ref 0.1–1.5)
BUN SERPL-MCNC: 13 MG/DL (ref 8–22)
CALCIUM ALBUM COR SERPL-MCNC: 8.8 MG/DL (ref 8.5–10.5)
CALCIUM SERPL-MCNC: 8.8 MG/DL (ref 8.5–10.5)
CHLORIDE SERPL-SCNC: 104 MMOL/L (ref 96–112)
CO2 SERPL-SCNC: 21 MMOL/L (ref 20–33)
CREAT SERPL-MCNC: 0.95 MG/DL (ref 0.5–1.4)
EOSINOPHIL # BLD AUTO: 0.26 K/UL (ref 0–0.51)
EOSINOPHIL NFR BLD: 2.7 % (ref 0–6.9)
ERYTHROCYTE [DISTWIDTH] IN BLOOD BY AUTOMATED COUNT: 44.1 FL (ref 35.9–50)
GFR SERPLBLD CREATININE-BSD FMLA CKD-EPI: 82 ML/MIN/1.73 M 2
GLOBULIN SER CALC-MCNC: 2.8 G/DL (ref 1.9–3.5)
GLUCOSE SERPL-MCNC: 85 MG/DL (ref 65–99)
HCG SERPL QL: NEGATIVE
HCT VFR BLD AUTO: 43.5 % (ref 37–47)
HGB BLD-MCNC: 14.4 G/DL (ref 12–16)
IMM GRANULOCYTES # BLD AUTO: 0.05 K/UL (ref 0–0.11)
IMM GRANULOCYTES NFR BLD AUTO: 0.5 % (ref 0–0.9)
LIPASE SERPL-CCNC: 16 U/L (ref 11–82)
LYMPHOCYTES # BLD AUTO: 3.24 K/UL (ref 1–4.8)
LYMPHOCYTES NFR BLD: 33.6 % (ref 22–41)
MCH RBC QN AUTO: 34 PG (ref 27–33)
MCHC RBC AUTO-ENTMCNC: 33.1 G/DL (ref 32.2–35.5)
MCV RBC AUTO: 102.8 FL (ref 81.4–97.8)
MONOCYTES # BLD AUTO: 0.64 K/UL (ref 0–0.85)
MONOCYTES NFR BLD AUTO: 6.6 % (ref 0–13.4)
NEUTROPHILS # BLD AUTO: 5.42 K/UL (ref 1.82–7.42)
NEUTROPHILS NFR BLD: 56.3 % (ref 44–72)
NRBC # BLD AUTO: 0 K/UL
NRBC BLD-RTO: 0 /100 WBC (ref 0–0.2)
PLATELET # BLD AUTO: 292 K/UL (ref 164–446)
PMV BLD AUTO: 10.5 FL (ref 9–12.9)
POTASSIUM SERPL-SCNC: 4.1 MMOL/L (ref 3.6–5.5)
PROT SERPL-MCNC: 6.8 G/DL (ref 6–8.2)
RBC # BLD AUTO: 4.23 M/UL (ref 4.2–5.4)
SODIUM SERPL-SCNC: 140 MMOL/L (ref 135–145)
WBC # BLD AUTO: 9.6 K/UL (ref 4.8–10.8)

## 2025-04-28 PROCEDURE — A9270 NON-COVERED ITEM OR SERVICE: HCPCS | Performed by: EMERGENCY MEDICINE

## 2025-04-28 PROCEDURE — 84703 CHORIONIC GONADOTROPIN ASSAY: CPT

## 2025-04-28 PROCEDURE — 99285 EMERGENCY DEPT VISIT HI MDM: CPT

## 2025-04-28 PROCEDURE — 80053 COMPREHEN METABOLIC PANEL: CPT

## 2025-04-28 PROCEDURE — 36415 COLL VENOUS BLD VENIPUNCTURE: CPT

## 2025-04-28 PROCEDURE — 85025 COMPLETE CBC W/AUTO DIFF WBC: CPT

## 2025-04-28 PROCEDURE — 96374 THER/PROPH/DIAG INJ IV PUSH: CPT

## 2025-04-28 PROCEDURE — 700105 HCHG RX REV CODE 258: Performed by: EMERGENCY MEDICINE

## 2025-04-28 PROCEDURE — 94760 N-INVAS EAR/PLS OXIMETRY 1: CPT

## 2025-04-28 PROCEDURE — 700102 HCHG RX REV CODE 250 W/ 637 OVERRIDE(OP): Performed by: EMERGENCY MEDICINE

## 2025-04-28 PROCEDURE — 83690 ASSAY OF LIPASE: CPT

## 2025-04-28 PROCEDURE — 700111 HCHG RX REV CODE 636 W/ 250 OVERRIDE (IP): Mod: JZ | Performed by: EMERGENCY MEDICINE

## 2025-04-28 PROCEDURE — 96375 TX/PRO/DX INJ NEW DRUG ADDON: CPT

## 2025-04-28 RX ORDER — MORPHINE SULFATE 4 MG/ML
4 INJECTION INTRAVENOUS ONCE
Status: COMPLETED | OUTPATIENT
Start: 2025-04-28 | End: 2025-04-28

## 2025-04-28 RX ORDER — DIPHENHYDRAMINE HYDROCHLORIDE 50 MG/ML
25 INJECTION, SOLUTION INTRAMUSCULAR; INTRAVENOUS ONCE
Status: COMPLETED | OUTPATIENT
Start: 2025-04-28 | End: 2025-04-28

## 2025-04-28 RX ORDER — ALUMINA, MAGNESIA, AND SIMETHICONE 2400; 2400; 240 MG/30ML; MG/30ML; MG/30ML
10 SUSPENSION ORAL ONCE
Status: COMPLETED | OUTPATIENT
Start: 2025-04-28 | End: 2025-04-28

## 2025-04-28 RX ORDER — SODIUM CHLORIDE 9 MG/ML
1000 INJECTION, SOLUTION INTRAVENOUS ONCE
Status: COMPLETED | OUTPATIENT
Start: 2025-04-28 | End: 2025-04-28

## 2025-04-28 RX ORDER — METOCLOPRAMIDE HYDROCHLORIDE 5 MG/ML
10 INJECTION INTRAMUSCULAR; INTRAVENOUS ONCE
Status: COMPLETED | OUTPATIENT
Start: 2025-04-28 | End: 2025-04-28

## 2025-04-28 RX ADMIN — DIPHENHYDRAMINE HYDROCHLORIDE 25 MG: 50 INJECTION, SOLUTION INTRAMUSCULAR; INTRAVENOUS at 05:46

## 2025-04-28 RX ADMIN — SODIUM CHLORIDE 1000 ML: 9 INJECTION, SOLUTION INTRAVENOUS at 05:56

## 2025-04-28 RX ADMIN — ALUMINUM HYDROXIDE, MAGNESIUM HYDROXIDE, AND DIMETHICONE 10 ML: 400; 400; 40 SUSPENSION ORAL at 09:15

## 2025-04-28 RX ADMIN — MORPHINE SULFATE 4 MG: 4 INJECTION, SOLUTION INTRAMUSCULAR; INTRAVENOUS at 05:53

## 2025-04-28 RX ADMIN — METOCLOPRAMIDE 10 MG: 5 INJECTION, SOLUTION INTRAMUSCULAR; INTRAVENOUS at 05:46

## 2025-04-28 ASSESSMENT — PAIN DESCRIPTION - PAIN TYPE: TYPE: ACUTE PAIN

## 2025-04-28 ASSESSMENT — FIBROSIS 4 INDEX: FIB4 SCORE: 0.63

## 2025-04-28 NOTE — ED NOTES
Med with maalox per MAR. Instructed to partake in PO challenge after 5-10min and will observe for tolerance.

## 2025-04-28 NOTE — ED TRIAGE NOTES
Patient ambulates to ER with a chief complaint of epigastric pain that started around 0200 with nausea and vomiting. Patient states the pain first started about 5 days ago and she got a prescription for Carafate. Patient states she took Carafate this morning when the pain started. Patient denies any blood in her vomit and states her last menstrual cycle was around 2 weeks ago and has been irregular.

## 2025-04-28 NOTE — ED PROVIDER NOTES
ED Provider Note    CHIEF COMPLAINT  Chief Complaint   Patient presents with    N/V    Abdominal Pain     Patient ambulates to ER with a chief complaint of epigastric pain that started around 0200 with nausea and vomiting. Patient states the pain first started about 5 days ago and she got a prescription for Carafate. Patient states she took Carafate this morning when the pain started. Patient denies any blood in her vomit and states her last menstrual cycle was around 2 weeks ago and has been irregular.        EXTERNAL RECORDS REVIEWED  Inpatient Notes ED note 4/24/25 when the patient was evaluated for the same complaint.  She had labs at that time that were reassuring.  Ultrasound of the right upper quadrant revealed gallbladder polyps but was otherwise normal.    HPI/ROS  LIMITATION TO HISTORY   Select: : None  OUTSIDE HISTORIAN(S):  None    Sandy Yang is a 29 y.o. female who presents with epigastric pain.  She was seen approximately 4 days ago for similar concerns.  Having pain after eating, vomiting.  First with urgent care then was referred to the ER for further workup.  In the ER she had a right upper quadrant ultrasound that did not show any gallstones or signs of obstruction.  She was prescribed Zofran and Carafate.  For a brief period of pain and symptoms had improved but over this past night she has had worsening symptoms.  She has had frequent vomiting.  Denies alcohol use.  Denies any recent cannabis use.    PAST MEDICAL HISTORY   has a past medical history of Cancer (HCC), Panic anxiety syndrome (7/8/2013), and PUD (peptic ulcer disease).    SURGICAL HISTORY   has a past surgical history that includes gastroscopy (12/23/2009); primary c section (8/9/12); breast biopsy (2/19/2014); and repeat c section (7/18/2017).    FAMILY HISTORY  Family History   Problem Relation Age of Onset    Cancer Paternal Grandmother         lung cancer    Heart Disease Maternal Uncle         MI age 45    Cancer  "Father         skin    Diabetes Maternal Grandfather     Heart Disease Maternal Grandfather        SOCIAL HISTORY  Social History     Tobacco Use    Smoking status: Former     Current packs/day: 0.50     Average packs/day: 0.5 packs/day for 4.0 years (2.0 ttl pk-yrs)     Types: Cigarettes    Smokeless tobacco: Never    Tobacco comments:     6 cig/day   Vaping Use    Vaping status: Never Used   Substance and Sexual Activity    Alcohol use: Not Currently     Comment: occ    Drug use: Not Currently     Types: Marijuana    Sexual activity: Yes     Partners: Male     Birth control/protection: None       CURRENT MEDICATIONS  Home Medications    **Home medications have not yet been reviewed for this encounter**         ALLERGIES  Allergies   Allergen Reactions    Lidocaine     Novocain      Used during dental surgery; had no affect toward deadening her pain    Rocephin [Ceftriaxone] Rash     Itching         PHYSICAL EXAM  VITAL SIGNS: /73   Pulse 63   Temp 36 °C (96.8 °F) (Temporal)   Resp 16   Ht 1.651 m (5' 5\")   Wt 83.3 kg (183 lb 10.3 oz)   LMP  (Within Weeks)   SpO2 97%   BMI 30.56 kg/m²   Constitutional: Alert and in no apparent distress.  HENT: Normocephalic atraumatic. Bilateral external ears normal. Nose normal. Mucous membranes are moist.  Eyes: Pupils are equal and reactive. Conjunctiva normal. Non-icteric sclera.   Neck: Normal range of motion without tenderness. Supple.   Cardiovascular: Regular rate and rhythm. No murmurs, gallops or rubs.  Thorax & Lungs: No increased work of breathing. Breath sounds are clear to auscultation bilaterally. No wheezing, rhonchi or rales.  Abdomen: Soft, nontender and nondistended.   Skin: Warm and dry. No rashes are noted.  Back: No bony tenderness, No CVA tenderness.   Extremities: 2+ peripheral pulses. Cap refill is less than 2 seconds. No edema, cyanosis, or clubbing.  Musculoskeletal: Good range of motion in all major joints. No tenderness to palpation or " major deformities noted.   Neurologic: Alert and appropriate for age. The patient moves all 4 extremities without obvious deficits.    LABS  Results for orders placed or performed during the hospital encounter of 04/24/25   VAGINAL PATHOGENS DNA PANEL    Collection Time: 04/24/25  5:27 PM   Result Value Ref Range    Candida species DNA Probe Negative Negative    Trichomonas vaginalis DNA Probe Negative Negative    Gardnerella vaginalis DNA Probe POSITIVE (A) Negative         COURSE & MEDICAL DECISION MAKING    ASSESSMENT, COURSE AND PLAN  Care Narrative:  This is a 29-year-old female presenting to the emergency department for the evaluation of nausea, vomiting and abdominal pain.  Patient did not appear to be in any acute distress on initial evaluation.  Vital signs are reassuring.  Physical exam was reassuring with no evidence of tenderness to palpation or distention.  Given the lack of tenderness to palpation, I do not think that she requires imaging at this time.    Labs were ordered in triage.  Her white blood cell count was 9.5 and no evidence of neutrophil predominance was noted.  Pregnancy test was negative.  I am less concerned for ectopic or ruptured ectopic pregnancy.  Her electrolytes were without derangement.  LFTs and lipase were within normal limits.    She denied any urinary symptoms and her pain is located in the left upper quadrant.  I am less concerned for occult UTI, pan of redness or kidney stone.    She denied any sort of pelvic pain concerning for ovarian torsion or cyst.    I am also much less concerned for acute appendicitis as she has no pain or tenderness in the right lower quadrant.    The patient was treated with Reglan, Benadryl, and morphine.  She was also given oral Maalox as well.  Upon reassessment, she was improved.  Vital signs are normal.  She does have a history of peptic ulcer I suspect she could have a gastritis versus recurrence of the ulcer.  She does have Carafate and  omeprazole at home which I encouraged her to take.  A referral to GI was placed.  She will follow-up and return to the emergency department any worsening signs or symptoms.    Hydration: Based on the patient's presentation of Inability to take oral fluids the patient was given IV fluids. IV Hydration was used because oral hydration was not adequate alone. Upon recheck following hydration, the patient was improved.    The patient presents with abdominal pain without signs of peritonitis or other life-threatening or serious etiology. The patient appears stable for discharge and has been instructed to return immediately if the symptoms worsen in any way, or in 8-12hr if not improved for re-evaluation. The patient has been instructed to return if the symptoms worsen or change in any way.    ADDITIONAL PROBLEMS MANAGED  None    DISPOSITION AND DISCUSSIONS  I have discussed management of the patient with the following physicians and MARGOTH's:  None    Discussion of management with other QHP or appropriate source(s): None     Escalation of care considered, and ultimately not performed:acute inpatient care management, however at this time, the patient is most appropriate for outpatient management    Barriers to care at this time, including but not limited to:  None .     Decision tools and prescription drugs considered including, but not limited to:  None .    FINAL IMPRESSION  1. Left upper quadrant abdominal pain      PRESCRIPTIONS  New Prescriptions    No medications on file     FOLLOW UP  [facility], Emergency Dept  [address]  [place] [address]  [phone]  Go to   As needed    -DISCHARGE-    Electronically signed by: Tima Palm II, M.D., 4/28/2025 5:38 AM

## 2025-04-28 NOTE — ED TRIAGE NOTES
".  Chief Complaint   Patient presents with    N/V    Abdominal Pain     Patient ambulates to ER with a chief complaint of epigastric pain that started around 0200 with nausea and vomiting. Patient states the pain first started about 5 days ago and she got a prescription for Carafate. Patient states she took Carafate this morning when the pain started. Patient denies any blood in her vomit and states her last menstrual cycle was around 2 weeks ago and has been irregular.      ./88   Pulse 72   Temp 36 °C (96.8 °F) (Temporal)   Resp 18   Ht 1.651 m (5' 5\")   Wt 83.3 kg (183 lb 10.3 oz)   LMP  (Within Weeks)   SpO2 98%   BMI 30.56 kg/m²     "

## 2025-04-28 NOTE — ED NOTES
Passed PO challenge with no worsening discomfort or nausea.     Provided DC instructions, PIV removed, discussed Rx info and outpt f/u with GI. Questions answered.

## (undated) DEVICE — KIT  I.V. START (100EA/CA)

## (undated) DEVICE — STAPLER SKIN DISP - (6/BX 10BX/CA) VISISTAT

## (undated) DEVICE — DETERGENT RENUZYME PLUS 10 OZ PACKET (50/BX)

## (undated) DEVICE — SODIUM CHL IRRIGATION 0.9% 1000ML (12EA/CA)

## (undated) DEVICE — SUTURE 0 GUT-PLAIN (36PK/BX)

## (undated) DEVICE — 0 CHROMIC CT-1

## (undated) DEVICE — TUBING CLEARLINK DUO-VENT - C-FLO (48EA/CA)

## (undated) DEVICE — GLOVE BIOGEL SZ 7.5 SURGICAL PF LTX - (50PR/BX 4BX/CA)

## (undated) DEVICE — CATHETER IV NON-SAFETY 18 GA X 1 1/4 (50/BX 4BX/CA)

## (undated) DEVICE — GLOVE BIOGEL SZ 7 SURGICAL PF LTX - (50PR/BX 4BX/CA)

## (undated) DEVICE — ELECTRODE DUAL RETURN W/ CORD - (50/PK)

## (undated) DEVICE — PAD LAP STERILE 18 X 18 - (5/PK 40PK/CA)

## (undated) DEVICE — GLOVE BIOGEL SZ 6.5 SURGICAL PF LTX (50PR/BX 4BX/CA)

## (undated) DEVICE — SUTURE 0 VICRYL PLUS CT-1 - 36 INCH (36/BX)

## (undated) DEVICE — SUTURE3-0 36IN VCRLY PLS ANTI (36PK/BX)

## (undated) DEVICE — DRESSING TRANSPARENT FILM TEGADERM 4 X 4.75" (50EA/BX)"

## (undated) DEVICE — SET EXTENSION WITH 2 PORTS (48EA/CA) ***PART #2C8610 IS A SUBSTITUTE*****

## (undated) DEVICE — WATER IRRIG. STER. 1500 ML - (9/CA)

## (undated) DEVICE — TRAY SPINAL ANESTHESIA NON-SAFETY (10/CA)

## (undated) DEVICE — PACK C-SECTION (2EA/CA)

## (undated) DEVICE — DRESSING NON ADHERENT 3 X 4 - STERILE (100/BX 12BX/CA)

## (undated) DEVICE — HEAD HOLDER JUNIOR/ADULT

## (undated) DEVICE — GLOVE BIOGEL INDICATOR SZ 7.5 SURGICAL PF LTX - (50PR/BX 4BX/CA)

## (undated) DEVICE — GLOVE BIOGEL INDICATOR SZ 7SURGICAL PF LTX - (50/BX 4BX/CA)